# Patient Record
Sex: MALE | Race: BLACK OR AFRICAN AMERICAN | URBAN - METROPOLITAN AREA
[De-identification: names, ages, dates, MRNs, and addresses within clinical notes are randomized per-mention and may not be internally consistent; named-entity substitution may affect disease eponyms.]

---

## 2017-06-17 ENCOUNTER — OUTPATIENT (OUTPATIENT)
Dept: OUTPATIENT SERVICES | Facility: HOSPITAL | Age: 62
LOS: 1 days | Discharge: HOME | End: 2017-06-17

## 2017-06-17 DIAGNOSIS — Z00.00 ENCOUNTER FOR GENERAL ADULT MEDICAL EXAMINATION WITHOUT ABNORMAL FINDINGS: ICD-10-CM

## 2017-08-01 ENCOUNTER — OUTPATIENT (OUTPATIENT)
Dept: OUTPATIENT SERVICES | Facility: HOSPITAL | Age: 62
LOS: 1 days | Discharge: HOME | End: 2017-08-01

## 2017-08-01 DIAGNOSIS — E11.9 TYPE 2 DIABETES MELLITUS WITHOUT COMPLICATIONS: ICD-10-CM

## 2017-08-01 DIAGNOSIS — L98.9 DISORDER OF THE SKIN AND SUBCUTANEOUS TISSUE, UNSPECIFIED: ICD-10-CM

## 2017-08-01 DIAGNOSIS — E66.9 OBESITY, UNSPECIFIED: ICD-10-CM

## 2018-06-18 ENCOUNTER — OUTPATIENT (OUTPATIENT)
Dept: OUTPATIENT SERVICES | Facility: HOSPITAL | Age: 63
LOS: 1 days | Discharge: HOME | End: 2018-06-18

## 2018-06-18 DIAGNOSIS — L30.9 DERMATITIS, UNSPECIFIED: ICD-10-CM

## 2018-06-18 DIAGNOSIS — L29.9 PRURITUS, UNSPECIFIED: ICD-10-CM

## 2018-09-24 ENCOUNTER — OUTPATIENT (OUTPATIENT)
Dept: OUTPATIENT SERVICES | Facility: HOSPITAL | Age: 63
LOS: 1 days | Discharge: HOME | End: 2018-09-24

## 2018-09-24 DIAGNOSIS — R63.4 ABNORMAL WEIGHT LOSS: ICD-10-CM

## 2018-10-22 ENCOUNTER — OUTPATIENT (OUTPATIENT)
Dept: OUTPATIENT SERVICES | Facility: HOSPITAL | Age: 63
LOS: 1 days | Discharge: HOME | End: 2018-10-22

## 2018-10-22 DIAGNOSIS — R63.4 ABNORMAL WEIGHT LOSS: ICD-10-CM

## 2018-10-26 ENCOUNTER — RESULT REVIEW (OUTPATIENT)
Age: 63
End: 2018-10-26

## 2018-10-26 ENCOUNTER — OUTPATIENT (OUTPATIENT)
Dept: OUTPATIENT SERVICES | Facility: HOSPITAL | Age: 63
LOS: 1 days | Discharge: HOME | End: 2018-10-26

## 2018-10-26 VITALS — DIASTOLIC BLOOD PRESSURE: 70 MMHG | SYSTOLIC BLOOD PRESSURE: 140 MMHG | RESPIRATION RATE: 18 BRPM | HEART RATE: 67 BPM

## 2018-10-26 VITALS
HEIGHT: 66 IN | SYSTOLIC BLOOD PRESSURE: 154 MMHG | OXYGEN SATURATION: 100 % | TEMPERATURE: 97 F | WEIGHT: 160.06 LBS | RESPIRATION RATE: 16 BRPM | HEART RATE: 76 BPM | DIASTOLIC BLOOD PRESSURE: 66 MMHG

## 2018-10-26 DIAGNOSIS — K46.9 UNSPECIFIED ABDOMINAL HERNIA WITHOUT OBSTRUCTION OR GANGRENE: Chronic | ICD-10-CM

## 2018-10-26 NOTE — ASU DISCHARGE PLAN (ADULT/PEDIATRIC). - NOTIFY
Increased Irritability or Sluggishness/Excessive Diarrhea/Pain not relieved by Medications/Inability to Tolerate Liquids or Foods/Persistent Nausea and Vomiting/Bleeding that does not stop/Fever greater than 101

## 2018-10-26 NOTE — H&P PST ADULT - HISTORY OF PRESENT ILLNESS
The following is from the recent office visit    CC: Weight loss  HPI: 62-year-old male presents today with 25 pound weight loss over the past 2 years. He underwent a colonoscopy one year ago for this and it was reportedly negative. He denies any other gastrointestinal symptoms. He denies diarrhea, constipation, melena, hematochezia, difficulty swallowing, early satiety, fever, chills, change in bowel habits, change in stool caliber. He does report itching and multiple different areas of his body for which she is seen today rheumatologist and one neurologist without a satisfying explanation for his symptoms  Past Medical History:  none    Allergies:  None  Medications:none  Social History: Social alcohol  Family History: No family history of intestinal disease

## 2018-10-29 LAB — SURGICAL PATHOLOGY STUDY: SIGNIFICANT CHANGE UP

## 2018-10-31 DIAGNOSIS — B96.81 HELICOBACTER PYLORI [H. PYLORI] AS THE CAUSE OF DISEASES CLASSIFIED ELSEWHERE: ICD-10-CM

## 2018-10-31 DIAGNOSIS — K20.0 EOSINOPHILIC ESOPHAGITIS: ICD-10-CM

## 2018-10-31 DIAGNOSIS — K29.80 DUODENITIS WITHOUT BLEEDING: ICD-10-CM

## 2018-10-31 DIAGNOSIS — K29.50 UNSPECIFIED CHRONIC GASTRITIS WITHOUT BLEEDING: ICD-10-CM

## 2018-10-31 DIAGNOSIS — R63.4 ABNORMAL WEIGHT LOSS: ICD-10-CM

## 2019-01-16 ENCOUNTER — OUTPATIENT (OUTPATIENT)
Dept: OUTPATIENT SERVICES | Facility: HOSPITAL | Age: 64
LOS: 1 days | Discharge: HOME | End: 2019-01-16

## 2019-01-16 DIAGNOSIS — D89.89 OTHER SPECIFIED DISORDERS INVOLVING THE IMMUNE MECHANISM, NOT ELSEWHERE CLASSIFIED: ICD-10-CM

## 2019-01-16 DIAGNOSIS — K46.9 UNSPECIFIED ABDOMINAL HERNIA WITHOUT OBSTRUCTION OR GANGRENE: Chronic | ICD-10-CM

## 2019-01-16 DIAGNOSIS — D80.3 SELECTIVE DEFICIENCY OF IMMUNOGLOBULIN G [IGG] SUBCLASSES: ICD-10-CM

## 2019-06-22 PROBLEM — Z00.00 ENCOUNTER FOR PREVENTIVE HEALTH EXAMINATION: Status: ACTIVE | Noted: 2019-06-22

## 2019-06-27 ENCOUNTER — RECORD ABSTRACTING (OUTPATIENT)
Age: 64
End: 2019-06-27

## 2019-06-27 DIAGNOSIS — Z78.9 OTHER SPECIFIED HEALTH STATUS: ICD-10-CM

## 2019-06-27 DIAGNOSIS — K20.9 ESOPHAGITIS, UNSPECIFIED: ICD-10-CM

## 2019-06-27 DIAGNOSIS — R63.4 ABNORMAL WEIGHT LOSS: ICD-10-CM

## 2019-06-27 DIAGNOSIS — K29.70 GASTRITIS, UNSPECIFIED, W/OUT BLEEDING: ICD-10-CM

## 2019-06-27 DIAGNOSIS — A04.8 OTHER SPECIFIED BACTERIAL INTESTINAL INFECTIONS: ICD-10-CM

## 2019-06-27 DIAGNOSIS — Z98.890 OTHER SPECIFIED POSTPROCEDURAL STATES: ICD-10-CM

## 2019-06-27 RX ORDER — OMEPRAZOLE MAGNESIUM 40 MG/1
40 CAPSULE, DELAYED RELEASE ORAL
Refills: 0 | Status: ACTIVE | COMMUNITY

## 2019-07-22 ENCOUNTER — APPOINTMENT (OUTPATIENT)
Dept: GASTROENTEROLOGY | Facility: CLINIC | Age: 64
End: 2019-07-22

## 2023-08-15 ENCOUNTER — EMERGENCY (EMERGENCY)
Facility: HOSPITAL | Age: 68
LOS: 0 days | Discharge: HOME | End: 2023-08-15
Attending: EMERGENCY MEDICINE
Payer: MEDICARE

## 2023-08-15 VITALS
WEIGHT: 139.99 LBS | HEART RATE: 119 BPM | TEMPERATURE: 97 F | DIASTOLIC BLOOD PRESSURE: 83 MMHG | OXYGEN SATURATION: 98 % | SYSTOLIC BLOOD PRESSURE: 188 MMHG | RESPIRATION RATE: 18 BRPM

## 2023-08-15 DIAGNOSIS — C22.1 INTRAHEPATIC BILE DUCT CARCINOMA: ICD-10-CM

## 2023-08-15 DIAGNOSIS — H40.212 ACUTE ANGLE-CLOSURE GLAUCOMA, LEFT EYE: ICD-10-CM

## 2023-08-15 DIAGNOSIS — R51.9 HEADACHE, UNSPECIFIED: ICD-10-CM

## 2023-08-15 DIAGNOSIS — E11.9 TYPE 2 DIABETES MELLITUS WITHOUT COMPLICATIONS: ICD-10-CM

## 2023-08-15 DIAGNOSIS — I10 ESSENTIAL (PRIMARY) HYPERTENSION: ICD-10-CM

## 2023-08-15 DIAGNOSIS — H53.8 OTHER VISUAL DISTURBANCES: ICD-10-CM

## 2023-08-15 DIAGNOSIS — H57.12 OCULAR PAIN, LEFT EYE: ICD-10-CM

## 2023-08-15 DIAGNOSIS — K46.9 UNSPECIFIED ABDOMINAL HERNIA WITHOUT OBSTRUCTION OR GANGRENE: Chronic | ICD-10-CM

## 2023-08-15 LAB
ALBUMIN SERPL ELPH-MCNC: 3.7 G/DL — SIGNIFICANT CHANGE UP (ref 3.5–5.2)
ALP SERPL-CCNC: 112 U/L — SIGNIFICANT CHANGE UP (ref 30–115)
ALT FLD-CCNC: 17 U/L — SIGNIFICANT CHANGE UP (ref 0–41)
ANION GAP SERPL CALC-SCNC: 11 MMOL/L — SIGNIFICANT CHANGE UP (ref 7–14)
AST SERPL-CCNC: 24 U/L — SIGNIFICANT CHANGE UP (ref 0–41)
BILIRUB SERPL-MCNC: 0.5 MG/DL — SIGNIFICANT CHANGE UP (ref 0.2–1.2)
BUN SERPL-MCNC: 28 MG/DL — HIGH (ref 10–20)
CALCIUM SERPL-MCNC: 8.9 MG/DL — SIGNIFICANT CHANGE UP (ref 8.4–10.5)
CHLORIDE SERPL-SCNC: 105 MMOL/L — SIGNIFICANT CHANGE UP (ref 98–110)
CO2 SERPL-SCNC: 22 MMOL/L — SIGNIFICANT CHANGE UP (ref 17–32)
CREAT SERPL-MCNC: 1.5 MG/DL — SIGNIFICANT CHANGE UP (ref 0.7–1.5)
EGFR: 50 ML/MIN/1.73M2 — LOW
GLUCOSE SERPL-MCNC: 124 MG/DL — HIGH (ref 70–99)
HCT VFR BLD CALC: 32.7 % — LOW (ref 42–52)
HGB BLD-MCNC: 10.5 G/DL — LOW (ref 14–18)
MCHC RBC-ENTMCNC: 26.5 PG — LOW (ref 27–31)
MCHC RBC-ENTMCNC: 32.1 G/DL — SIGNIFICANT CHANGE UP (ref 32–37)
MCV RBC AUTO: 82.6 FL — SIGNIFICANT CHANGE UP (ref 80–94)
NRBC # BLD: 0 /100 WBCS — SIGNIFICANT CHANGE UP (ref 0–0)
PLATELET # BLD AUTO: 193 K/UL — SIGNIFICANT CHANGE UP (ref 130–400)
PMV BLD: 11.1 FL — HIGH (ref 7.4–10.4)
POTASSIUM SERPL-MCNC: 3.6 MMOL/L — SIGNIFICANT CHANGE UP (ref 3.5–5)
POTASSIUM SERPL-SCNC: 3.6 MMOL/L — SIGNIFICANT CHANGE UP (ref 3.5–5)
PROT SERPL-MCNC: 7.1 G/DL — SIGNIFICANT CHANGE UP (ref 6–8)
RBC # BLD: 3.96 M/UL — LOW (ref 4.7–6.1)
RBC # FLD: 13.2 % — SIGNIFICANT CHANGE UP (ref 11.5–14.5)
SODIUM SERPL-SCNC: 138 MMOL/L — SIGNIFICANT CHANGE UP (ref 135–146)
TROPONIN T SERPL-MCNC: <0.01 NG/ML — SIGNIFICANT CHANGE UP
WBC # BLD: 11.98 K/UL — HIGH (ref 4.8–10.8)
WBC # FLD AUTO: 11.98 K/UL — HIGH (ref 4.8–10.8)

## 2023-08-15 PROCEDURE — 99284 EMERGENCY DEPT VISIT MOD MDM: CPT

## 2023-08-15 PROCEDURE — 80053 COMPREHEN METABOLIC PANEL: CPT

## 2023-08-15 PROCEDURE — 93010 ELECTROCARDIOGRAM REPORT: CPT

## 2023-08-15 PROCEDURE — 99285 EMERGENCY DEPT VISIT HI MDM: CPT

## 2023-08-15 PROCEDURE — 36415 COLL VENOUS BLD VENIPUNCTURE: CPT

## 2023-08-15 PROCEDURE — 84484 ASSAY OF TROPONIN QUANT: CPT

## 2023-08-15 PROCEDURE — 93005 ELECTROCARDIOGRAM TRACING: CPT

## 2023-08-15 PROCEDURE — 85027 COMPLETE CBC AUTOMATED: CPT

## 2023-08-15 RX ORDER — ACETAZOLAMIDE 250 MG/1
500 TABLET ORAL ONCE
Refills: 0 | Status: DISCONTINUED | OUTPATIENT
Start: 2023-08-15 | End: 2023-08-15

## 2023-08-15 RX ORDER — AMLODIPINE BESYLATE 2.5 MG/1
5 TABLET ORAL ONCE
Refills: 0 | Status: COMPLETED | OUTPATIENT
Start: 2023-08-15 | End: 2023-08-15

## 2023-08-15 RX ORDER — TIMOLOL 0.5 %
1 DROPS OPHTHALMIC (EYE) ONCE
Refills: 0 | Status: COMPLETED | OUTPATIENT
Start: 2023-08-15 | End: 2023-08-15

## 2023-08-15 RX ORDER — DORZOLAMIDE HYDROCHLORIDE 20 MG/ML
1 SOLUTION/ DROPS OPHTHALMIC ONCE
Refills: 0 | Status: COMPLETED | OUTPATIENT
Start: 2023-08-15 | End: 2023-08-15

## 2023-08-15 RX ORDER — BRIMONIDINE TARTRATE 2 MG/MG
1 SOLUTION/ DROPS OPHTHALMIC ONCE
Refills: 0 | Status: COMPLETED | OUTPATIENT
Start: 2023-08-15 | End: 2023-08-15

## 2023-08-15 RX ORDER — LATANOPROST 0.05 MG/ML
1 SOLUTION/ DROPS OPHTHALMIC; TOPICAL ONCE
Refills: 0 | Status: COMPLETED | OUTPATIENT
Start: 2023-08-15 | End: 2023-08-15

## 2023-08-15 RX ADMIN — Medication 1 DROP(S): at 06:45

## 2023-08-15 RX ADMIN — BRIMONIDINE TARTRATE 1 DROP(S): 2 SOLUTION/ DROPS OPHTHALMIC at 06:45

## 2023-08-15 RX ADMIN — LATANOPROST 1 DROP(S): 0.05 SOLUTION/ DROPS OPHTHALMIC; TOPICAL at 06:45

## 2023-08-15 RX ADMIN — AMLODIPINE BESYLATE 5 MILLIGRAM(S): 2.5 TABLET ORAL at 11:17

## 2023-08-15 RX ADMIN — DORZOLAMIDE HYDROCHLORIDE 1 DROP(S): 20 SOLUTION/ DROPS OPHTHALMIC at 06:45

## 2023-08-15 NOTE — ED PROVIDER NOTE - ATTENDING APP SHARED VISIT CONTRIBUTION OF CARE
I personally saw the patient. SUKUMAR ortiz and I provided critical care for a total of 35 minutes. I provided a substantive portion of the care and the majority of the critical care time.     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 L sided, throbbing, accomp by L monocoular visual loss and eye pain. Admitted to [facility] 3d prior for same, ct head neg, was then transferred to [place] for mri head which was also neg, d/c to home last night with rx for migraines, htn & instr to f/u with his ophthalmologist today. Pt came to ED tonight to severity of pain. Denies cp/sob, nv, abd pain, focal numbness or weakness.    PE:  middle-aged m, holding head, appears uncomfortable  skin warm, dry  ncat  EYES- r eye nml, pupil rll, sclera clear, L eye +proptosis, orbit firm to palpation, corneal clouding, fixed mid-dilated pupil non-reactive to light/accom, tonometry- 11 OD, 60 OS, va- 20/20 OD, unable to measure/no vision OS  Cardiovascular: S1 and S2, regular rate, regular rhyth  neck supple  rrr nl s1s2 no mrg  ctab no wrr  abd soft ntnd no palpable masses no rgr  back non-tender no cvat  ext no cce dpi  neuro aaox3 grossly nf exam

## 2023-08-15 NOTE — ED PROVIDER NOTE - PHYSICAL EXAMINATION
Physical Exam    Vital Signs: I have reviewed the initial vital signs.  Constitutional: well-nourished, appears stated age, no acute distress  Eyes: (+) left eye with scleral injection and erythema, left pupil is fixed, not reactive to light and mid dilated, pt left eye is firm to palpation. pt is unable to see out of the left eye. pt right conjunctiva is pink and sclera is clear with 20/20 vision. pt pressure is 11 in the right eye and 60 in the left eye initially.   Cardiovascular: S1 and S2, regular rate, regular rhythm, well-perfused extremities, radial pulses equal and 2+ b/l.   Respiratory: unlabored respiratory effort, clear to auscultation bilaterally no wheezing, rales and rhonchi. pt is speaking full sentences. no accessory muscle use.   Musculoskeletal: FROM of b/l upper and lower extremities.   Integumentary: warm, dry, no rash  Neurologic: awake, alert, extremities’ motor and sensory functions grossly intact.  steady gait.   Psychiatric: appropriate mood, appropriate affect

## 2023-08-15 NOTE — ED PROVIDER NOTE - PATIENT PORTAL LINK FT
You can access the FollowMyHealth Patient Portal offered by [facility] by registering at the following website: [email or web address]. By joining [employer]’s FollowMyHealth portal, you will also be able to view your health information using other applications (apps) compatible with our system.

## 2023-08-15 NOTE — CONSULT NOTE ADULT - SUBJECTIVE AND OBJECTIVE BOX
67-year-old male with a past medical history bile duct cancer on immunotherapy, diabetes, and recently diagnosed hypertension presenting to ED with left eye pain and headache. pt reports going to cancer appt reporting left sided head-ache and was sent to ER in [place]. Imaging at that time was negative and pt was discharged on sumatriptan and topiramate. pt reported taking 2 doses of topiramate as prescribed saturday night and sunday morning with subsequent eye pain, decreasing vision and head-ache that brought him to ER.    IOP initially in 60s OS in ER. pt was started on gtts in the ER without significant improvement overnight.    VA 20/30, LP  IOP: 10/60  Pupil: reactive OD, dilated OS  EOM full OU with XT OS    L/L: wnl OU  C/S: w/q OU  K: cl OD; edematous/cloudy/DM OS  AC: formed OD; narrow/shallow OS  I: r/r OD; dilated OS  L: NS OU    DFE OD: poor view    B scan: no RD OU  Gonio: open OD; poor view OS    1. acute angle closure OS   -possibly aggravated by topiramate  -with gtts only IOP improved to 21; VA improved to CF, pain resolved  -cornea remains cloudy after IOP improvement, unable to perform LPI  -SE +1 OD  -discontinue topiramate for now  -start xal 0/1, timolol 0/2, brimonidine 0/3, dorzolamide 0/3, pred forte 0/4  -OK from ophthalmology to stand-point for discharge  -follow up tomorrow morning in the eye clinic for re-evaluation    2. XT OS  -denies prior history  -denies poor vision OS prior to acute event  -possible decompensated phoria given current poor vision OS    Abtaviat Sanket PGY-4  s/w Dr Grady and Bharat Vasquez

## 2023-08-15 NOTE — ED ADULT TRIAGE NOTE - CHIEF COMPLAINT QUOTE
c/o headache x weeks, patient was admitted at [place] for a week, MRI was negative from friday and sunday, patient was supposed to see ophthalmologist outpatient but headache worsening

## 2023-08-15 NOTE — CONSULT NOTE ADULT - ATTENDING COMMENTS
Mr Rene has had pain over his left eye diagnosed as migraine and prescribed topiramate, presents with pain, IOP of 60, shallow chamber and dilated pupil OS. The unilateral pain is not consistent with topiramate which should be bilateral. The dilated, fixed pupil OS also more consistent with angle closure glaucoma. Topiramate may play a role in the shallowing of the anterior chamber. IOP lowered and pain relieved with topical drops, will follow as an outpatient, likely need laser as this may be an angle closure attack potentiated by the topiramate.

## 2023-08-15 NOTE — ED ADULT NURSE NOTE - CHIEF COMPLAINT QUOTE
c/o headache x weeks, patient was admitted at [place] for a week, MRI was negative from friday and sunday, patient was supposed to see ophthalmologist outpatient but headache worsening

## 2023-08-15 NOTE — ED PROVIDER NOTE - NSFOLLOWUPCLINICS_GEN_ALL_ED_FT
[facility]  Ophthalmolgy  [address], [address]  [place] [address]  Phone: [phone]  Fax:   Scheduled Appointment: 8/16/2023

## 2023-08-15 NOTE — ED PROVIDER NOTE - NSFOLLOWUPINSTRUCTIONS_ED_ALL_ED_FT
Female WHAT YOU NEED TO KNOW:    What is glaucoma? Glaucoma is an eye disease that causes vision loss in one or both eyes. Glaucoma is usually caused by fluid buildup behind the eye. This puts pressure on your optic nerve and damages it. The 2 main types are open-angle and closed-angle glaucoma. Fluid normally drains through canals in the eyes. The fluid contains deposits that get left behind in the canals as the fluid drains. Over time, the deposits create a blockage that keeps fluid from draining easily through the canals. Open-angle means the blockage cannot be seen in tests. Closed-angle means the blockage can be seen.     What increases my risk for glaucoma?   •Being farsighted or nearsighted  •Age older than 40  •Being female  •Family history of glaucoma  •Certain medicines, such as antidepressants, antihistamines, or corticosteroids  •Medical conditions such as diabetes, hypothyroidism, or high blood pressure  •Severe eye injury  •Abnormal eye structures, such as a thick cornea  •Obstructive sleep apnea    What are the signs and symptoms of glaucoma?   •Open-angle glaucoma usually affects both eyes. Signs and symptoms may be worse in 1 eye. Any of the following may develop slowly over time:?Blind spots or areas of vision loss that get larger over time and combine  ?Loss of peripheral (side) vision  ?Only seeing clearly when you look straight ahead  •Closed-angle glaucoma may cause any of the following in one or both eyes, and may happen suddenly:?Vision loss or blurred vision  ?Eye pain that may be severe  ?Headaches that may get better when you sleep  ?Trouble seeing at night  ?Halos or rainbows around lights    How is glaucoma diagnosed? Your healthcare provider will ask about your symptoms and examine your eyes. He or she will check your peripheral vision. He or she may check how well your eyes drain fluid. Your pupils may be dilated (widened) for some of the following tests:  •A tonometry test is used to measure your eye pressure. Your eyes are numbed with eyedrops and your healthcare provider touches your eyes with an instrument. A puff of air may instead be blown into your eyes and pressure is measured with a light.  •A slit-lamp exam helps your healthcare provider have a better view of the inside of your eye. Your provider will shine a bright light in your eye to check for inflammation.  •An ophthalmoscope is used to check for optic nerve damage. Your healthcare provider will turn off the lights in the room and shine a bright light in your eyes.    How is glaucoma treated? The goal of treatment is to reduce eye pressure and prevent damage to your optic nerve. You may need any of the following:  •Eye pressure medicines help decrease eye pressure. They may also decrease the amount of fluid your eyes make or help your eyes drain better.  •Laser surgery may be needed if other treatments do not work. Healthcare providers use a laser to open your eye drainage system or create a new opening for eye fluid to drain.    What can I do to manage glaucoma?   •Get regular eye exams. This will help healthcare providers monitor your glaucoma.  •Avoid behaviors that increase eye pressure. Try not to strain when you have a bowel movement. Do not wear tight clothing around your neck or chest. Do not push or lift anything heavier than 5 pounds. Avoid strenuous activity.  •Eat foods that are healthy for your eyes. Certain foods help get more blood to vessels in your eyes. An example is leafy green vegetables, such as fresh spinach. Your healthcare provider or a dietitian can help you create meals plans.  •Carry medical alert identification. Wear medical alert jewelry or carry a card that says you have glaucoma. Ask your healthcare provider where to get these items.  Medical Alert Jewelry    When should I seek immediate care?   •You have a sudden loss of vision.  •You have blurry vision and a severe headache.  •You have severe eye pain or a change in your vision.  •You have nausea and are vomiting, along with vision changes.    When should I call my doctor?   •Your symptoms get worse, even after treatment.  •You have questions or concerns about your condition or care.    CARE AGREEMENT:    You have the right to help plan your care. Learn about your health condition and how it may be treated. Discuss treatment options with your healthcare providers to decide what care you want to receive. You always have the right to refuse treatment.

## 2023-08-15 NOTE — ED PROVIDER NOTE - CLINICAL SUMMARY MEDICAL DECISION MAKING FREE TEXT BOX
Labs and EKG were ordered and reviewed, where indicated.  Imaging was ordered and reviewed by me, where indicated.  Appropriate medications for patient's presenting complaints were ordered and effects were reassessed, where indicated.  Patient's records (prior hospital, ED visit, and/or nursing home note) were reviewed, if available.  Additional history was obtained from EMS, family, and/or PCP (where available).  Escalation to admission/observation was considered.  Patient transported to Ophthalmology clinic for defitinive surgical mgmt    L eye pain, visual loss, ha, recent admissions with neg cth/mri head, exam c/w acute angle closure glaucoma, ophtho consulted, bb/other drops administered as per ophtho's instructions, transferred to clinic for definitive surgcal mgmt/laser    L acute

## 2023-08-15 NOTE — ED PROVIDER NOTE - OBJECTIVE STATEMENT
67-year-old male with a past medical history bile duct cancer on immunotherapy, diabetes, and recently diagnosed hypertension on amlodipine presents to the ED for evaluation of left eye pain and left-sided headache that began on Friday.  Patient reports he was seen at [facility] had a normal CT then was transferred to [facility].  Patient had an MRI performed at [place] which was normal.  Patient was started on Topamax, butabital, amlodipine, artificial tears, pregabalin, and a Medrol Dosepak.  Patient has an appointment with his ophthalmologist at 10 AM but was having excruciating pain in the left eye again to the ER.  Patient denies use of blood thinners, recent trauma, dizziness, weakness, numbness, tingling, urinary or bowel retention or incontinence, weakness, or use of glasses or contacts.

## 2023-08-15 NOTE — ED PROVIDER NOTE - PROGRESS NOTE DETAILS
FF: optho was consulted immediately. optho advise to start timolol, brimonidine, lantaprost, and dorzolomide drops every 15 minutes. I started drops. pt had two rounds of the eye drops and then I repeated the pressure in the left eye which is now 68. optho is aware and is arriving to the ed now. 3 round of eye drops given. optho now at bedside. Nohemi - wrap up note - Patient signed out to me follow-up ophthalmology evaluation.  Patient presented with headache found to be in acute alcohol drawl, given pulmonary dorzolamide latanoprost timolol drops.  Patient was seen by ophthalmology with improvement in pressure reevaluated.  Pathology patient stable for discharge with plan for ophthalmology clinic follow-up tomorrow the patient might undergo procedure tomorrow.  Patient has drops to be taken overnight.  Lastly this might be despite topiramate and the patient was advised not to take it.

## 2023-08-16 ENCOUNTER — APPOINTMENT (OUTPATIENT)
Dept: OPHTHALMOLOGY | Facility: CLINIC | Age: 68
End: 2023-08-16

## 2024-01-16 ENCOUNTER — APPOINTMENT (OUTPATIENT)
Dept: OPHTHALMOLOGY | Facility: CLINIC | Age: 69
End: 2024-01-16

## 2024-03-12 ENCOUNTER — OUTPATIENT (OUTPATIENT)
Dept: OUTPATIENT SERVICES | Facility: HOSPITAL | Age: 69
LOS: 1 days | End: 2024-03-12
Payer: MEDICARE

## 2024-03-12 ENCOUNTER — APPOINTMENT (OUTPATIENT)
Dept: OPHTHALMOLOGY | Facility: CLINIC | Age: 69
End: 2024-03-12
Payer: MEDICARE

## 2024-03-12 DIAGNOSIS — K46.9 UNSPECIFIED ABDOMINAL HERNIA WITHOUT OBSTRUCTION OR GANGRENE: Chronic | ICD-10-CM

## 2024-03-12 DIAGNOSIS — H25.89 OTHER AGE-RELATED CATARACT: ICD-10-CM

## 2024-03-12 PROCEDURE — 92136 OPHTHALMIC BIOMETRY: CPT | Mod: 26

## 2024-03-12 PROCEDURE — 92136 OPHTHALMIC BIOMETRY: CPT

## 2024-03-25 ENCOUNTER — TRANSCRIPTION ENCOUNTER (OUTPATIENT)
Age: 69
End: 2024-03-25

## 2024-03-25 ENCOUNTER — OUTPATIENT (OUTPATIENT)
Dept: OUTPATIENT SERVICES | Facility: HOSPITAL | Age: 69
LOS: 1 days | Discharge: HOME | End: 2024-03-25
Payer: MEDICARE

## 2024-03-25 VITALS
WEIGHT: 128.09 LBS | TEMPERATURE: 96 F | OXYGEN SATURATION: 99 % | DIASTOLIC BLOOD PRESSURE: 48 MMHG | RESPIRATION RATE: 18 BRPM | HEART RATE: 90 BPM | SYSTOLIC BLOOD PRESSURE: 93 MMHG | HEIGHT: 66 IN

## 2024-03-25 VITALS — SYSTOLIC BLOOD PRESSURE: 107 MMHG | HEART RATE: 71 BPM | DIASTOLIC BLOOD PRESSURE: 53 MMHG

## 2024-03-25 DIAGNOSIS — K46.9 UNSPECIFIED ABDOMINAL HERNIA WITHOUT OBSTRUCTION OR GANGRENE: Chronic | ICD-10-CM

## 2024-03-25 DIAGNOSIS — H25.22 AGE-RELATED CATARACT, MORGAGNIAN TYPE, LEFT EYE: ICD-10-CM

## 2024-03-25 DIAGNOSIS — Z98.890 OTHER SPECIFIED POSTPROCEDURAL STATES: Chronic | ICD-10-CM

## 2024-03-25 LAB — GLUCOSE BLDC GLUCOMTR-MCNC: 82 MG/DL — SIGNIFICANT CHANGE UP (ref 70–99)

## 2024-03-25 PROCEDURE — 82962 GLUCOSE BLOOD TEST: CPT

## 2024-03-25 PROCEDURE — C1889: CPT

## 2024-03-25 PROCEDURE — V2632: CPT

## 2024-03-25 RX ORDER — DAPAGLIFLOZIN 10 MG/1
1 TABLET, FILM COATED ORAL
Refills: 0 | DISCHARGE

## 2024-03-25 RX ORDER — CHOLECALCIFEROL (VITAMIN D3) 125 MCG
0 CAPSULE ORAL
Refills: 0 | DISCHARGE

## 2024-03-25 RX ORDER — FAMOTIDINE 10 MG/ML
1 INJECTION INTRAVENOUS
Refills: 0 | DISCHARGE

## 2024-03-25 NOTE — ASU PATIENT PROFILE, ADULT - AS SC BRADEN MOISTURE
CVICU OT note- attempted OT tx session this am- pt currently declining out of bed activity - reports he is feeling \"depressed\" because \"the cardiologist just came in here and told me there's not much more else they can do for me.\" Educated pt on the importance of out of bed activity to increase overall strength and activity tolerance. RN present and aware that pt is declining out of bed activity at this time.    (4) rarely moist

## 2024-03-25 NOTE — ASU PATIENT PROFILE, ADULT - NSICDXPASTMEDICALHX_GEN_ALL_CORE_FT
PAST MEDICAL HISTORY:  History of medical problems dm type 2, bile duct cancer on chemo, cataracts, gerd, neuropathy (crawling sensation)

## 2024-03-25 NOTE — ASU PATIENT PROFILE, ADULT - FALL HARM RISK - HARM RISK INTERVENTIONS

## 2024-03-27 DIAGNOSIS — H26.8 OTHER SPECIFIED CATARACT: ICD-10-CM

## 2024-03-27 DIAGNOSIS — H25.13 AGE-RELATED NUCLEAR CATARACT, BILATERAL: ICD-10-CM

## 2024-03-28 DIAGNOSIS — H25.12 AGE-RELATED NUCLEAR CATARACT, LEFT EYE: ICD-10-CM

## 2024-04-23 PROBLEM — Z87.898 PERSONAL HISTORY OF OTHER SPECIFIED CONDITIONS: Chronic | Status: ACTIVE | Noted: 2024-03-25

## 2024-05-14 ENCOUNTER — APPOINTMENT (OUTPATIENT)
Dept: OPHTHALMOLOGY | Facility: CLINIC | Age: 69
End: 2024-05-14

## 2024-06-11 NOTE — ASU PATIENT PROFILE, ADULT - ACCEPTABLE
Procedure: Anoscopy    Surgeon: Alexsander Lima    Anesthesia: None    Findings: See the progress note attached for all findings    Operative Summary: The patient was placed in a prone position on the proctoscopy table, the hips were flexed in the jackknife position.    Using a well-lubricated finger, digital rectal exam was performed in anticipation of the anoscope.    The anoscope was then inserted under direct visualization.    Excellent visualization was performed in a 360° fashion.    The scope was removed. The patient was taken out of the prone, jackknife, position.     The patient tolerated the procedure well.    0

## 2024-06-17 ENCOUNTER — EMERGENCY (EMERGENCY)
Facility: HOSPITAL | Age: 69
LOS: 0 days | Discharge: HOME | End: 2024-06-18
Attending: STUDENT IN AN ORGANIZED HEALTH CARE EDUCATION/TRAINING PROGRAM
Payer: MEDICARE

## 2024-06-17 VITALS
SYSTOLIC BLOOD PRESSURE: 97 MMHG | TEMPERATURE: 100 F | HEART RATE: 75 BPM | RESPIRATION RATE: 18 BRPM | OXYGEN SATURATION: 100 % | DIASTOLIC BLOOD PRESSURE: 59 MMHG | WEIGHT: 117.95 LBS | HEIGHT: 66 IN

## 2024-06-17 DIAGNOSIS — R53.83 OTHER FATIGUE: ICD-10-CM

## 2024-06-17 DIAGNOSIS — R50.9 FEVER, UNSPECIFIED: ICD-10-CM

## 2024-06-17 DIAGNOSIS — Z20.822 CONTACT WITH AND (SUSPECTED) EXPOSURE TO COVID-19: ICD-10-CM

## 2024-06-17 DIAGNOSIS — Z98.890 OTHER SPECIFIED POSTPROCEDURAL STATES: Chronic | ICD-10-CM

## 2024-06-17 DIAGNOSIS — C22.1 INTRAHEPATIC BILE DUCT CARCINOMA: ICD-10-CM

## 2024-06-17 DIAGNOSIS — E11.9 TYPE 2 DIABETES MELLITUS WITHOUT COMPLICATIONS: ICD-10-CM

## 2024-06-17 PROCEDURE — 87040 BLOOD CULTURE FOR BACTERIA: CPT

## 2024-06-17 PROCEDURE — 71045 X-RAY EXAM CHEST 1 VIEW: CPT

## 2024-06-17 PROCEDURE — 87150 DNA/RNA AMPLIFIED PROBE: CPT

## 2024-06-17 PROCEDURE — 87186 SC STD MICRODIL/AGAR DIL: CPT

## 2024-06-17 PROCEDURE — 85730 THROMBOPLASTIN TIME PARTIAL: CPT

## 2024-06-17 PROCEDURE — 87077 CULTURE AEROBIC IDENTIFY: CPT

## 2024-06-17 PROCEDURE — 85610 PROTHROMBIN TIME: CPT

## 2024-06-17 PROCEDURE — 93005 ELECTROCARDIOGRAM TRACING: CPT

## 2024-06-17 PROCEDURE — 81003 URINALYSIS AUTO W/O SCOPE: CPT

## 2024-06-17 PROCEDURE — 83605 ASSAY OF LACTIC ACID: CPT

## 2024-06-17 PROCEDURE — 99285 EMERGENCY DEPT VISIT HI MDM: CPT | Mod: FS

## 2024-06-17 PROCEDURE — 0241U: CPT

## 2024-06-17 PROCEDURE — 76705 ECHO EXAM OF ABDOMEN: CPT

## 2024-06-17 PROCEDURE — 96375 TX/PRO/DX INJ NEW DRUG ADDON: CPT

## 2024-06-17 PROCEDURE — 85025 COMPLETE CBC W/AUTO DIFF WBC: CPT

## 2024-06-17 PROCEDURE — 99285 EMERGENCY DEPT VISIT HI MDM: CPT | Mod: 25

## 2024-06-17 PROCEDURE — 74177 CT ABD & PELVIS W/CONTRAST: CPT | Mod: MC

## 2024-06-17 PROCEDURE — 36415 COLL VENOUS BLD VENIPUNCTURE: CPT

## 2024-06-17 PROCEDURE — 96374 THER/PROPH/DIAG INJ IV PUSH: CPT

## 2024-06-17 PROCEDURE — 80053 COMPREHEN METABOLIC PANEL: CPT

## 2024-06-18 VITALS
OXYGEN SATURATION: 99 % | RESPIRATION RATE: 18 BRPM | TEMPERATURE: 99 F | HEART RATE: 75 BPM | DIASTOLIC BLOOD PRESSURE: 56 MMHG | SYSTOLIC BLOOD PRESSURE: 115 MMHG

## 2024-06-18 LAB
ALBUMIN SERPL ELPH-MCNC: 2.2 G/DL — LOW (ref 3.5–5.2)
ALP SERPL-CCNC: 426 U/L — HIGH (ref 30–115)
ALT FLD-CCNC: 30 U/L — SIGNIFICANT CHANGE UP (ref 0–41)
ANION GAP SERPL CALC-SCNC: 8 MMOL/L — SIGNIFICANT CHANGE UP (ref 7–14)
APPEARANCE UR: CLEAR — SIGNIFICANT CHANGE UP
APTT BLD: 27.7 SEC — SIGNIFICANT CHANGE UP (ref 27–39.2)
AST SERPL-CCNC: 55 U/L — HIGH (ref 0–41)
BASOPHILS # BLD AUTO: 0.01 K/UL — SIGNIFICANT CHANGE UP (ref 0–0.2)
BASOPHILS NFR BLD AUTO: 0.4 % — SIGNIFICANT CHANGE UP (ref 0–1)
BILIRUB SERPL-MCNC: 1.6 MG/DL — HIGH (ref 0.2–1.2)
BILIRUB UR-MCNC: NEGATIVE — SIGNIFICANT CHANGE UP
BUN SERPL-MCNC: 21 MG/DL — HIGH (ref 10–20)
CALCIUM SERPL-MCNC: 7.2 MG/DL — LOW (ref 8.4–10.5)
CHLORIDE SERPL-SCNC: 99 MMOL/L — SIGNIFICANT CHANGE UP (ref 98–110)
CO2 SERPL-SCNC: 22 MMOL/L — SIGNIFICANT CHANGE UP (ref 17–32)
COLOR SPEC: YELLOW — SIGNIFICANT CHANGE UP
CREAT SERPL-MCNC: 1.2 MG/DL — SIGNIFICANT CHANGE UP (ref 0.7–1.5)
DIFF PNL FLD: NEGATIVE — SIGNIFICANT CHANGE UP
E COLI DNA BLD POS QL NAA+NON-PROBE: SIGNIFICANT CHANGE UP
EGFR: 65 ML/MIN/1.73M2 — SIGNIFICANT CHANGE UP
EOSINOPHIL # BLD AUTO: 0.03 K/UL — SIGNIFICANT CHANGE UP (ref 0–0.7)
EOSINOPHIL NFR BLD AUTO: 1.2 % — SIGNIFICANT CHANGE UP (ref 0–8)
FLUAV AG NPH QL: SIGNIFICANT CHANGE UP
FLUBV AG NPH QL: SIGNIFICANT CHANGE UP
GLUCOSE SERPL-MCNC: 231 MG/DL — HIGH (ref 70–99)
GLUCOSE UR QL: NEGATIVE MG/DL — SIGNIFICANT CHANGE UP
GRAM STN FLD: ABNORMAL
GRAM STN FLD: ABNORMAL
HCT VFR BLD CALC: 23.4 % — LOW (ref 42–52)
HGB BLD-MCNC: 8.1 G/DL — LOW (ref 14–18)
IMM GRANULOCYTES NFR BLD AUTO: 1.2 % — HIGH (ref 0.1–0.3)
INR BLD: 1.42 RATIO — HIGH (ref 0.65–1.3)
KETONES UR-MCNC: NEGATIVE MG/DL — SIGNIFICANT CHANGE UP
LACTATE SERPL-SCNC: 1.9 MMOL/L — SIGNIFICANT CHANGE UP (ref 0.7–2)
LEUKOCYTE ESTERASE UR-ACNC: NEGATIVE — SIGNIFICANT CHANGE UP
LYMPHOCYTES # BLD AUTO: 0.58 K/UL — LOW (ref 1.2–3.4)
LYMPHOCYTES # BLD AUTO: 23.4 % — SIGNIFICANT CHANGE UP (ref 20.5–51.1)
MCHC RBC-ENTMCNC: 29.3 PG — SIGNIFICANT CHANGE UP (ref 27–31)
MCHC RBC-ENTMCNC: 34.6 G/DL — SIGNIFICANT CHANGE UP (ref 32–37)
MCV RBC AUTO: 84.8 FL — SIGNIFICANT CHANGE UP (ref 80–94)
METHOD TYPE: SIGNIFICANT CHANGE UP
MONOCYTES # BLD AUTO: 0.19 K/UL — SIGNIFICANT CHANGE UP (ref 0.1–0.6)
MONOCYTES NFR BLD AUTO: 7.7 % — SIGNIFICANT CHANGE UP (ref 1.7–9.3)
NEUTROPHILS # BLD AUTO: 1.64 K/UL — SIGNIFICANT CHANGE UP (ref 1.4–6.5)
NEUTROPHILS NFR BLD AUTO: 66.1 % — SIGNIFICANT CHANGE UP (ref 42.2–75.2)
NITRITE UR-MCNC: NEGATIVE — SIGNIFICANT CHANGE UP
NRBC # BLD: 0 /100 WBCS — SIGNIFICANT CHANGE UP (ref 0–0)
PH UR: 6 — SIGNIFICANT CHANGE UP (ref 5–8)
PLATELET # BLD AUTO: 166 K/UL — SIGNIFICANT CHANGE UP (ref 130–400)
PMV BLD: 10.7 FL — HIGH (ref 7.4–10.4)
POTASSIUM SERPL-MCNC: 4.1 MMOL/L — SIGNIFICANT CHANGE UP (ref 3.5–5)
POTASSIUM SERPL-SCNC: 4.1 MMOL/L — SIGNIFICANT CHANGE UP (ref 3.5–5)
PROT SERPL-MCNC: 5.9 G/DL — LOW (ref 6–8)
PROT UR-MCNC: SIGNIFICANT CHANGE UP MG/DL
PROTHROM AB SERPL-ACNC: 16.2 SEC — HIGH (ref 9.95–12.87)
RBC # BLD: 2.76 M/UL — LOW (ref 4.7–6.1)
RBC # FLD: 13.6 % — SIGNIFICANT CHANGE UP (ref 11.5–14.5)
RSV RNA NPH QL NAA+NON-PROBE: SIGNIFICANT CHANGE UP
SARS-COV-2 RNA SPEC QL NAA+PROBE: SIGNIFICANT CHANGE UP
SODIUM SERPL-SCNC: 129 MMOL/L — LOW (ref 135–146)
SP GR SPEC: 1.03 — SIGNIFICANT CHANGE UP (ref 1–1.03)
SPECIMEN SOURCE: SIGNIFICANT CHANGE UP
UROBILINOGEN FLD QL: 1 MG/DL — SIGNIFICANT CHANGE UP (ref 0.2–1)
WBC # BLD: 2.48 K/UL — LOW (ref 4.8–10.8)
WBC # FLD AUTO: 2.48 K/UL — LOW (ref 4.8–10.8)

## 2024-06-18 PROCEDURE — 74177 CT ABD & PELVIS W/CONTRAST: CPT | Mod: 26,MC

## 2024-06-18 PROCEDURE — 76705 ECHO EXAM OF ABDOMEN: CPT | Mod: 26

## 2024-06-18 PROCEDURE — 71045 X-RAY EXAM CHEST 1 VIEW: CPT | Mod: 26

## 2024-06-18 PROCEDURE — 99223 1ST HOSP IP/OBS HIGH 75: CPT

## 2024-06-18 PROCEDURE — 93010 ELECTROCARDIOGRAM REPORT: CPT

## 2024-06-18 RX ORDER — CEFEPIME 1 G/1
1000 INJECTION, POWDER, FOR SOLUTION INTRAMUSCULAR; INTRAVENOUS ONCE
Refills: 0 | Status: COMPLETED | OUTPATIENT
Start: 2024-06-18 | End: 2024-06-18

## 2024-06-18 RX ORDER — METRONIDAZOLE 500 MG
500 TABLET ORAL ONCE
Refills: 0 | Status: COMPLETED | OUTPATIENT
Start: 2024-06-18 | End: 2024-06-18

## 2024-06-18 RX ORDER — AZTREONAM 2 G
2000 VIAL (EA) INJECTION EVERY 8 HOURS
Refills: 0 | Status: DISCONTINUED | OUTPATIENT
Start: 2024-06-18 | End: 2024-06-18

## 2024-06-18 RX ORDER — VANCOMYCIN HCL 1 G
1000 VIAL (EA) INTRAVENOUS ONCE
Refills: 0 | Status: COMPLETED | OUTPATIENT
Start: 2024-06-18 | End: 2024-06-18

## 2024-06-18 RX ORDER — METRONIDAZOLE 500 MG
500 TABLET ORAL ONCE
Refills: 0 | Status: DISCONTINUED | OUTPATIENT
Start: 2024-06-18 | End: 2024-06-18

## 2024-06-18 RX ORDER — SODIUM CHLORIDE 9 MG/ML
1650 INJECTION, SOLUTION INTRAVENOUS ONCE
Refills: 0 | Status: COMPLETED | OUTPATIENT
Start: 2024-06-18 | End: 2024-06-18

## 2024-06-18 RX ORDER — AZTREONAM 2 G
2000 VIAL (EA) INJECTION ONCE
Refills: 0 | Status: COMPLETED | OUTPATIENT
Start: 2024-06-18 | End: 2024-06-18

## 2024-06-18 RX ORDER — ACETAMINOPHEN 500 MG
650 TABLET ORAL ONCE
Refills: 0 | Status: COMPLETED | OUTPATIENT
Start: 2024-06-18 | End: 2024-06-18

## 2024-06-18 RX ADMIN — Medication 250 MILLIGRAM(S): at 03:21

## 2024-06-18 RX ADMIN — Medication 650 MILLIGRAM(S): at 01:25

## 2024-06-18 RX ADMIN — SODIUM CHLORIDE 1650 MILLILITER(S): 9 INJECTION, SOLUTION INTRAVENOUS at 01:25

## 2024-06-18 RX ADMIN — Medication 50 MILLIGRAM(S): at 05:26

## 2024-06-18 RX ADMIN — CEFEPIME 100 MILLIGRAM(S): 1 INJECTION, POWDER, FOR SOLUTION INTRAMUSCULAR; INTRAVENOUS at 01:36

## 2024-06-18 RX ADMIN — Medication 100 MILLIGRAM(S): at 06:13

## 2024-06-18 NOTE — ED PROVIDER NOTE - NSPREEXISTRELATION_ED_A_ED_FT
Patient with history of cholangiocarcinoma, on chemo, follows at [place], has had multiple procedures at [place] [facility] and should continue to get his care there.

## 2024-06-18 NOTE — ED PROVIDER NOTE - NSRECPHYSICIAN_ED_A_ED_FT
Problem: Anger Management/Homicidal Ideation:  Goal: Absence of angry outbursts  Description: Absence of angry outbursts  Outcome: Ongoing  Note: Patient is free from angry outbursts and participates appropriately in milieu and group therapy. Problem: Depressive Behavior With or Without Suicide Precautions:  Goal: Ability to disclose and discuss suicidal ideas will improve  Description: Ability to disclose and discuss suicidal ideas will improve  Outcome: Ongoing  Note: Patient denies suicidal ideation and has no plans to self harm at this time. Q15 checks are maintained at this time for safety. Dr. Ramsey Eid

## 2024-06-18 NOTE — ED PROVIDER NOTE - NSICDXPASTMEDICALHX_GEN_ALL_CORE_FT
I took over care of this pt at 7 AM from Dr. Altamirano.  They are awaiting labs, IVF.    He gave a stool sample.  This was sent.      Results for orders placed or performed during the hospital encounter of 06/07/18   CBC & Auto Differential   Result Value Ref Range    WBC 3.7 (L) 4.2 - 11.0 K/mcL    RBC 5.24 4.50 - 5.90 mil/mcL    HGB 17.0 13.0 - 17.0 g/dL    HCT 47.1 39.0 - 51.0 %    MCV 89.9 78.0 - 100.0 fl    MCH 32.4 26.0 - 34.0 pg    MCHC 36.1 32.0 - 36.5 g/dL    RDW-CV 12.8 11.0 - 15.0 %     140 - 450 K/mcL    DIFF TYPE AUTOMATED DIFFERENTIAL     Neutrophil 68 %    LYMPH 20 %    MONO 9 %    EOSIN 3 %    BASO 0 %    Absolute Neutrophil 2.5 1.8 - 7.7 K/mcL    Absolute Lymph 0.7 (L) 1.0 - 4.0 K/mcL    Absolute Mono 0.3 0.3 - 0.9 K/mcL    Absolute Eos 0.1 0.1 - 0.5 K/mcL    Absolute Baso 0.0 0.0 - 0.3 K/mcL   Chem 8 Panel - Point of Care   Result Value Ref Range    Sodium  135 - 145 mmol/L    Potassium POC 4.4 3.4 - 5.1 mmol/L    Chloride  98 - 107 mmol/L    CALCIUM IONIZED-POC 1.06 (L) 1.15 - 1.29 mmol/L    CO2 Total 21 19 - 24 mmol/L    GLUCOSE  (H) 65 - 99 mg/dL    BUN POC 22 (H) 6 - 20 mg/dL    HEMATOCRIT POC 53.0 (H) 39.0 - 51.0 %    Hemoglobin POC 18.0 (H) 13.0 - 17.0 g/dL    ANION GAP POC 16 mmol/L    Creatinine POC 1.00 0.67 - 1.17 mg/dL    Estimated GFR  (POC) >90     Estimated GFR Non- (POC) 79      Stool cultures and C. difficile are pending. He is given a liter and feels better. Mild leukopenia noted which she has had previously. Discharge home encouraged p.o. intake of fluids especially and await cultures and PCR.    R19.7 Diarrhea of presumed infectious origin  (primary encounter diagnosis)       Jameel Maritno MD  06/07/18 0902     PAST MEDICAL HISTORY:  History of medical problems dm type 2, bile duct cancer on chemo, cataracts, gerd, neuropathy (crawling sensation)

## 2024-06-18 NOTE — CONSULT NOTE ADULT - SUBJECTIVE AND OBJECTIVE BOX
Patient is a pleasant 68-year-old male with a past medical history of diabetes, cataracts, neuropathy, and cholangiocarcinoma who is followed primarily at [facility] in [place] who has been following him since May 2022 for cholangiocarcinoma in which she was deemed not a surgical candidate because of extensive metastatic disease.  Patient has had prior common bile duct and hepatic duct stenting.  Patient at one point was considered for a hepatectomy but mets excluded him from this.  Patient has been on chemotherapy and also required a cholecystoduodenostomy per Chanelle for additional drainage.  Patient currently is relatively at baseline.  Patient has some fatigue.  Patient does not have current abdominal pain.  Patient present with son in the ER.      PAST MEDICAL & SURGICAL HISTORY:  DM  bile duct cancer on chemo  cataracts  Neuropathy       H/O inguinal hernia repair  right groin      MEDICATIONS  (STANDING):  aztreonam  IVPB 2000 milliGRAM(s) IV Intermittent every 8 hours  metroNIDAZOLE  IVPB 500 milliGRAM(s) IV Intermittent once        Allergies  cefepime (Pruritus)        Review of Systems  General: See HPI  HEENT: Denies Trouble Swallowing ,Denies  Sore Throat , Denies Change in hearing/vision/speech ,Denies Dizziness    Cardio: Denies  Chest Pain , Palpitations    Respiratory: Denies worsening of SOB, Denies Cough  Abdomen: See detailed HPI  Neuro: Denies Headache Denies Dizziness, Denies Paresthesias  MSK: Denies pain in Bones/Joints/Muscles   Psych: Patient denies depression, denies suicidal or homicidal ideations  Integ: Patient Denies rash, or new skin lesions     Vital Signs Last 24 Hrs  T(C): 36.5 (2024 07:46), Max: 37.9 (2024 23:37)  T(F): 97.7 (2024 07:46), Max: 100.3 (2024 23:37)  HR: 55 (2024 07:46) (55 - 75)  BP: 95/59 (2024 07:46) (95/59 - 114/53)  BP(mean): --  RR: 18 (2024 07:46) (18 - 18)  SpO2: 100% (2024 07:46) (98% - 100%)    Parameters below as of 2024 07:46  Patient On (Oxygen Delivery Method): room air    Physical Exam  Gen: NAD  Head: NC/AT, no visible deformity  ENT: PERRLA, Sclera Non Icteric   Cardio: S1/S2 No S3/S4, Regular  Resp: CTA B/L  Abdomen: Soft, ND/NT- Drain noted Right abdomen   Neuro: AAOx3, Cranial Nerve II-XII intact   Extremities: FROM x 4  Skin: No jaundice, no excoriation       Labs:                          8.1    2.48  )-----------( 166      ( 2024 00:50 )             23.4       Auto Neutrophil %: 66.1 % (24 @ 00:50)  Auto Immature Granulocyte %: 1.2 % (24 @ 00:50)        129<L>  |  99  |  21<H>  ----------------------------<  231<H>  4.1   |  22  |  1.2      Calcium: 7.2 mg/dL (24 @ 00:50)      LFTs:             5.9  | 1.6  | 55       ------------------[426     ( 2024 00:50 )  2.2  | x    | 30              Lactate, Blood: 1.9 mmol/L (24 @ 00:50)      Coags:     16.20  ----< 1.42    ( 2024 00:50 )     27.7        Urinalysis Basic - ( 2024 04:56 )  Color: Yellow / Appearance: Clear / S.026 / pH: x  Gluc: x / Ketone: Negative mg/dL  / Bili: Negative / Urobili: 1.0 mg/dL   Blood: x / Protein: Trace mg/dL / Nitrite: Negative   Leuk Esterase: Negative / RBC: x / WBC x   Sq Epi: x / Non Sq Epi: x / Bacteria: x      RADIOLOGY & ADDITIONAL STUDIES:  US Abdomen Upper Quadrant Right 24   IMPRESSION:    Limited visualization of the liver. No hepatic collection on provided   images. See concurrent CT for additional findings.    Gallbladder is not visualized.        CT Abdomen and Pelvis w/ IV Cont 24   IMPRESSION:    1.8 cm cystic hypoattenuating lesion in the right hepatic lobe with   slight surrounding enhancement. Additional adjacent subcentimeter similar   lesion. This is indeterminate for an infectious focus/abscess, cyst or   metastasis. Correlation with outside imaging is suggested. Given the   history of infection, short-term CT is suggested to evaluate for   evolution.    Heterogeneous liver with biliary stents. Cholangiocarcinoma is not well   delineated on this exam. Correlation with outside imaging and oncologic   history.    Indeterminate devices in the proximal duodenum. Comparison with outside   imaging and history is recommended.    Small to moderate abdominopelvic ascites.

## 2024-06-18 NOTE — ED PROVIDER NOTE - ATTENDING APP SHARED VISIT CONTRIBUTION OF CARE
68 years old male history of diabetes, bile duct cancer on chemotherapy (last dose 5 days ago for 48 hours from [facility])  pt here for 1 day of fever. Tmax 102. pt endorsing fatigue, chills.  no cp, ap, diarrhea, cough, congestion.  no syncope/trauma.    vs soft bp  gen- NAD, aaox3  card-rrr  lungs-ctab, no wheezing or rhonchi  abd-sntnd, no guarding or rebound  neuro- full str/sensation, cn ii-xii grossly intact, normal coordination

## 2024-06-18 NOTE — CONSULT NOTE ADULT - SUBJECTIVE AND OBJECTIVE BOX
GENERAL SURGERY CONSULT NOTE    Patient: MARIAM IVORY , 68y (53)Male   MRN: [id number]  Location: [facility] ED  Visit: 24 Emergency  Date: 24 @ 06:26    HPI:  68 M pmh of DM and cholangiocarcinoma presents to the ED with 1 day of chills. Patient diagnosed with cholangiocarcinoma May 2022 and subsequently had plastic stents placed in right and left hepatic ducts. In 2022 underwent right portal vein embolization with hopes of hepatectomy but widespread metastasis to liver excluded patient as surgical candidate. In 2022 started chemotherpy including gemcitabine, cisplatin, and durvalumab. 2023 had cholecystoduodenostomy after perc denys for cholecystetitis. Has a peritoneal drain in place for recurrent ascites (1 L per day). Patient's last chemotherapy consisted of gemcitabine and durvalumab 5 days ago with plans from his oncologist Dr. Littlejohn at [facility] to change to FOLFIRI. Upon arrival today patient with Tmax 100.3 with decreased PO intake but denies fevers at home, nausea/vomiting, diarrhea/constipation, yellowing of skin/eyes, chest pain, sob, dark/bloody stools, pale stools. Last intervention was 2024 when one of his biliary stents was exchanged due to obstruction.     PAST MEDICAL & SURGICAL HISTORY:  History of medical problems  dm type 2, bile duct cancer on chemo, cataracts, gerd, neuropathy (crawling sensation)      H/O inguinal hernia repair  right groin          Home Medications:  famotidine 20 mg oral tablet: 1 tab(s) orally once a day (at bedtime) (25 Mar 2024 09:39)  Farxiga 10 mg oral tablet: 1 tab(s) orally once a day (25 Mar 2024 09:38)  PARoxetine 10 mg oral tablet: 1 tab(s) orally once a day (25 Mar 2024 09:40)  pregabalin 100 mg oral capsule: 1 cap(s) orally 2 times a day (25 Mar 2024 09:40)  vitamin d3: weekly (25 Mar 2024 09:39)        VITALS:  T(F): 96.7 (24 @ 05:33), Max: 100.3 (24 @ 23:37)  HR: 60 (24 @ 05:33) (60 - 75)  BP: 114/53 (24 @ 05:33) (97/59 - 114/53)  RR: 18 (24 @ 05:33) (18 - 18)  SpO2: 98% (24 @ 05:33) (98% - 100%)    PHYSICAL EXAM:  General: NAD, AAOx3, calm and cooperative  HEENT: NCAT, LEATHA, EOMI, Trachea ML, Neck supple, no scleral icterus  Cardiac: RRR S1, S2, no Murmurs, rubs or gallops  Respiratory: CTAB, normal respiratory effort, breath sounds equal BL, no wheeze, rhonchi or crackles  Abdomen: Soft, non-distended, non-tender, tenkoff in place no erythema/edema/fluctuance at insertion site  skin: no appreciable rashes/jaundice    MEDICATIONS  (STANDING):    MEDICATIONS  (PRN):      LAB/STUDIES:                        8.1    2.48  )-----------( 166      ( 2024 00:50 )             23.4         129<L>  |  99  |  21<H>  ----------------------------<  231<H>  4.1   |  22  |  1.2    Ca    7.2<L>      2024 00:50    TPro  5.9<L>  /  Alb  2.2<L>  /  TBili  1.6<H>  /  DBili  x   /  AST  55<H>  /  ALT  30  /  AlkPhos  426<H>      PT/INR - ( 2024 00:50 )   PT: 16.20 sec;   INR: 1.42 ratio         PTT - ( 2024 00:50 )  PTT:27.7 sec  LIVER FUNCTIONS - ( 2024 00:50 )  Alb: 2.2 g/dL / Pro: 5.9 g/dL / ALK PHOS: 426 U/L / ALT: 30 U/L / AST: 55 U/L / GGT: x           Urinalysis Basic - ( 2024 04:56 )    Color: Yellow / Appearance: Clear / S.026 / pH: x  Gluc: x / Ketone: Negative mg/dL  / Bili: Negative / Urobili: 1.0 mg/dL   Blood: x / Protein: Trace mg/dL / Nitrite: Negative   Leuk Esterase: Negative / RBC: x / WBC x   Sq Epi: x / Non Sq Epi: x / Bacteria: x                  Urinalysis with Rflx Culture (collected 2024 04:56)      IMAGING:    < from: CT Abdomen and Pelvis w/ IV Cont (24 @ 02:28) >  IMPRESSION:    1.8 cm cystic hypoattenuating lesion in the right hepatic lobe with   slight surrounding enhancement. Additional adjacent subcentimeter similar   lesion. This is indeterminate for an infectious focus/abscess, cyst or   metastasis. Correlation with outside imaging is suggested. Given the   history of infection, short-term CT is suggested to evaluate for   evolution.    Heterogeneous liver with biliary stents. Cholangiocarcinoma is not well   delineated on this exam. Correlation with outside imaging and oncologic   history.    Indeterminate devices in the proximal duodenum. Comparison with outside   imaging and history is recommended.    Small to moderate abdominopelvic ascites.    < end of copied text >      ACCESS DEVICES:  [X] Peripheral IV       GENERAL SURGERY CONSULT NOTE    Patient: MARIAM IVORY , 68y (53)Male   MRN: [id number]  Location: [facility] ED  Visit: 24 Emergency  Date: 24 @ 06:26    HPI:  68 M pmh of DM and cholangiocarcinoma presents to the ED with 1 day of chills and fevers at home to 1002F. Patient diagnosed with cholangiocarcinoma May 2022 and subsequently had plastic stents placed in right and left hepatic ducts. In 2022 underwent right portal vein embolization with hopes of hepatectomy but widespread metastasis to liver excluded patient as surgical candidate. In 2022 started chemotherpy including gemcitabine, cisplatin, and durvalumab. 2023 had cholecystoduodenostomy after perc denys for cholecystitis. Has a peritoneal drain in place for recurrent ascites (1 L per day). Patient's last chemotherapy consisted of gemcitabine and durvalumab 5 days ago with plans from his oncologist Dr. Littlejohn at [facility] [place] to change to FOLFIRI. Upon arrival today patient with Tmax 100.3 with decreased PO intake but denies nausea/vomiting, diarrhea/constipation, yellowing of skin/eyes, chest pain, sob, dark/bloody stools, pale stools, sick contacts. Last intervention was 2024 when one of his biliary stents was exchanged due to obstruction.     PAST MEDICAL & SURGICAL HISTORY:  History of medical problems  dm type 2, bile duct cancer on chemo, cataracts, gerd, neuropathy (crawling sensation)      H/O inguinal hernia repair  right groin          Home Medications:  famotidine 20 mg oral tablet: 1 tab(s) orally once a day (at bedtime) (25 Mar 2024 09:39)  Farxiga 10 mg oral tablet: 1 tab(s) orally once a day (25 Mar 2024 09:38)  PARoxetine 10 mg oral tablet: 1 tab(s) orally once a day (25 Mar 2024 09:40)  pregabalin 100 mg oral capsule: 1 cap(s) orally 2 times a day (25 Mar 2024 09:40)  vitamin d3: weekly (25 Mar 2024 09:39)        VITALS:  T(F): 96.7 (24 @ 05:33), Max: 100.3 (24 @ 23:37)  HR: 60 (06-18-24 @ 05:33) (60 - 75)  BP: 114/53 (24 @ 05:33) (97/59 - 114/53)  RR: 18 (24 @ 05:33) (18 - 18)  SpO2: 98% (24 @ 05:33) (98% - 100%)    PHYSICAL EXAM:  General: NAD, AAOx3, calm and cooperative  HEENT: NCAT, LEATHA, EOMI, Trachea ML, Neck supple, no scleral icterus  Cardiac: RRR S1, S2, no Murmurs, rubs or gallops  Respiratory: CTAB, normal respiratory effort, breath sounds equal BL, no wheeze, rhonchi or crackles  Abdomen: Soft, non-distended, non-tender, tenkoff in place no erythema/edema/fluctuance at insertion site  skin: no appreciable rashes/jaundice    MEDICATIONS  (STANDING):    MEDICATIONS  (PRN):      LAB/STUDIES:                        8.1    2.48  )-----------( 166      ( 2024 00:50 )             23.4         129<L>  |  99  |  21<H>  ----------------------------<  231<H>  4.1   |  22  |  1.2    Ca    7.2<L>      2024 00:50    TPro  5.9<L>  /  Alb  2.2<L>  /  TBili  1.6<H>  /  DBili  x   /  AST  55<H>  /  ALT  30  /  AlkPhos  426<H>      PT/INR - ( 2024 00:50 )   PT: 16.20 sec;   INR: 1.42 ratio         PTT - ( 2024 00:50 )  PTT:27.7 sec  LIVER FUNCTIONS - ( 2024 00:50 )  Alb: 2.2 g/dL / Pro: 5.9 g/dL / ALK PHOS: 426 U/L / ALT: 30 U/L / AST: 55 U/L / GGT: x           Urinalysis Basic - ( 2024 04:56 )    Color: Yellow / Appearance: Clear / S.026 / pH: x  Gluc: x / Ketone: Negative mg/dL  / Bili: Negative / Urobili: 1.0 mg/dL   Blood: x / Protein: Trace mg/dL / Nitrite: Negative   Leuk Esterase: Negative / RBC: x / WBC x   Sq Epi: x / Non Sq Epi: x / Bacteria: x                  Urinalysis with Rflx Culture (collected 2024 04:56)      IMAGING:    < from: CT Abdomen and Pelvis w/ IV Cont (24 @ 02:28) >  IMPRESSION:    1.8 cm cystic hypoattenuating lesion in the right hepatic lobe with   slight surrounding enhancement. Additional adjacent subcentimeter similar   lesion. This is indeterminate for an infectious focus/abscess, cyst or   metastasis. Correlation with outside imaging is suggested. Given the   history of infection, short-term CT is suggested to evaluate for   evolution.    Heterogeneous liver with biliary stents. Cholangiocarcinoma is not well   delineated on this exam. Correlation with outside imaging and oncologic   history.    Indeterminate devices in the proximal duodenum. Comparison with outside   imaging and history is recommended.    Small to moderate abdominopelvic ascites.    < end of copied text >      ACCESS DEVICES:  [X] Peripheral IV

## 2024-06-18 NOTE — ED PROVIDER NOTE - CONSIDERATION OF ADMISSION OBSERVATION
Appropriate medications for patients presenting complaints were offered and effects were re-assessed Escalation to admission was considered. At this time patient requires inpatient hospitalization however patient transferred to [facility] for [place] of care. Consideration of Admission/Observation

## 2024-06-18 NOTE — ED PROVIDER NOTE - OBJECTIVE STATEMENT
68 years old male history of diabetes, bile duct cancer on chemotherapy (last dose 5 days ago for 48 hours from [facility] [place]) presents complaint fever started this evening.  Fever Tmax 102 tonight so son brought patient to ED for evaluation.  Patient otherwise denies headache, chest pain, abdominal pain, vomiting, diarrhea, change in appetite or urinary symptoms.  Denies known sick contact or recent travel.     Patient has a Mediport to the right side of his chest and a peritoneal drain to the right side of the abdomen that he drained at home periodically.

## 2024-06-18 NOTE — ED PROVIDER NOTE - CLINICAL SUMMARY MEDICAL DECISION MAKING FREE TEXT BOX
68 years old male history of diabetes, bile duct cancer on chemotherapy (last dose 5 days ago for 48 hours from [facility] [place] [facility]) presents complaint fever started this evening. vs reviewed labs imaging obtained and reviewed blood cultures obtained broad spectrum antibiotics given ct demonstrated 1.8 cm cystic hypoattenuating lesion in the right hepatic lobe with slight surrounding enhancement. Additional adjacent subcentimeter similar lesion. This is indeterminate for an infectious focus/abscess, cyst or metastasis. surgery and gi consulted, patient requested to be transferred to [facility] , transfer initiated and patient transferred.

## 2024-06-18 NOTE — ED PROVIDER NOTE - PROGRESS NOTE DETAILS
labs w/ abn lfts, ct ordered and results appreciated. case d/w surgery who rec transfer to [facility]. PA spoke to [facility] transfer center who approved transfer by Dr. Ramsey Eid. pt endorsed to Dr. Hernandez- monitor pending transfer Authored by Dr. Marquis Hernandez s/o received from dr. ross pending transport to [facility]

## 2024-06-18 NOTE — CONSULT NOTE ADULT - ATTENDING COMMENTS
Agree with the above.  Patient with cholangiocarcinoma, here with weakness and fevers.  CT results reviewed and concerning for a 1.8 cm hypoattenuating lesion in the right liver lobe suspicious for an abscess.  Patient prefers to get the rest of his care at [place] and will be transferred from ER to ER.  If patient remains hospitalized, he would benefit from IR evaluation of his right hepatic lobe lesion and may require ERCP for possible stent exchange.  Rest of recommendations per the note above. Agree with the above.  Patient with cholangiocarcinoma, here with weakness and fevers.  CT results reviewed and concerning for a 1.8 cm hypoattenuating lesion in the right liver lobe suspicious for an abscess.  Patient prefers to get the rest of his care at [facility] and will be transferred from ER to ER.  If patient remains here, he would benefit from IR evaluation of his right hepatic lobe lesion (? Abscess) and ERCP w/ stent exchange.  Rest of recommendations per the note above.

## 2024-06-18 NOTE — ED PROVIDER NOTE - PHYSICAL EXAMINATION
CONSTITUTIONAL: Well-appearing; ; in no apparent distress.   EYES: PERRL; EOM intact.   CARDIOVASCULAR: Normal S1, S2; no murmurs, rubs, or gallops.   RESPIRATORY: Normal chest excursion with respiration; breath sounds clear and equal bilaterally; no wheezes, rhonchi, or rales.  GI/: Normal bowel sounds; non-distended; non-tender; no palpable organomegaly.  Drain to right upper quadrant.  Wound covered in clean dry dressing.  MS: No calf swelling and tenderness.  SKIN: No rash.  NEURO/PSYCH: A & O x 3; grossly unremarkable.  Speaking coherently and moving all extremities.

## 2024-06-18 NOTE — CONSULT NOTE ADULT - ASSESSMENT
Patient is a pleasant 68-year-old male with a past medical history of diabetes, cataracts, neuropathy, and cholangiocarcinoma who is followed primarily at [facility] in [place] who has been following him since May 2022 for cholangiocarcinoma in which she was deemed not a surgical candidate because of extensive metastatic disease.  Patient has had prior common bile duct and hepatic duct stenting.  Patient at one point was considered for a hepatectomy but mets excluded him from this.  Patient has been on chemotherapy and also required a cholecystoduodenostomy per Chanelle for additional drainage.  Patient currently is relatively at baseline.  Patient has some fatigue.  Patient does not have current abdominal pain.  Patient present with son in the ER.    Patient and family have expectations of being transferred today to [facility].  Patient and family request this given they have been followed closely by them for the last 3 years.  Son reached out to team already at [facility] and is expecting transfer this morning.    Cholangiocarcinoma with Mets  - Currently stable but has unfortunate Diagnosis  - Given close care over the last few years at [facility] it is a reasonable request by both patient and family to be transferred there  - No plan for Advanced intervention at [facility]  - Follows Dr Sanchez- Advanced GI at [facility] along with Heme Onc team there   - Re-Call if transfer unsuccessful- prognosis overall poor given mets and years since Dx
68 M pmh of DM and cholangiocarcinoma presents to the ED with 1 day of chills. Patient diagnosed with cholangiocarcinoma May 2022 and subsequently had plastic stents placed in right and left hepatic ducts. In June 2022 underwent right portal vein embolization with hopes of hepatectomy but widespread metastasis to liver excluded patient as surgical candidate. In August of 2022 started chemotherpy including gemcitabine, cisplatin, and durvalumab. March 2023 had cholecystoduodenostomy after perc denys for cholecystetitis. Has a peritoneal drain in place for recurrent ascites (1 L per day). Patient's last chemotherapy consisted of gemcitabine and durvalumab 5 days ago with plans from his oncologist Dr. Littlejohn at [facility] to change to FOLFIRI. Upon arrival today patient with Tmax 100.3 with decreased PO intake but denies fevers at home, nausea/vomiting, diarrhea/constipation, yellowing of skin/eyes, chest pain, sob, dark/bloody stools, pale stools. Last intervention was March 2024 when one of his biliary stents was exchanged due to obstruction.     Plan    Zosyn  NPO, IVF  Will likely need EGD/cholangiogram to r/o obstruction of right hepatic stent  Correlation of current CT with prior to assess for hepatic abscess formation  Possible biliary decompression by interventional radiology/advanced GI to treat cholangitis  Transfer to [facility] for further management by patient's advanced GI team (Dr. Sanchez) and oncology team (Dr. Littlejohn)    [id number]

## 2024-06-20 LAB
-  AMPICILLIN/SULBACTAM: SIGNIFICANT CHANGE UP
-  AMPICILLIN: SIGNIFICANT CHANGE UP
-  AZTREONAM: SIGNIFICANT CHANGE UP
-  CEFAZOLIN: SIGNIFICANT CHANGE UP
-  CEFEPIME: SIGNIFICANT CHANGE UP
-  CEFOXITIN: SIGNIFICANT CHANGE UP
-  CEFTRIAXONE: SIGNIFICANT CHANGE UP
-  CIPROFLOXACIN: SIGNIFICANT CHANGE UP
-  ERTAPENEM: SIGNIFICANT CHANGE UP
-  GENTAMICIN: SIGNIFICANT CHANGE UP
-  IMIPENEM: SIGNIFICANT CHANGE UP
-  LEVOFLOXACIN: SIGNIFICANT CHANGE UP
-  MEROPENEM: SIGNIFICANT CHANGE UP
-  PIPERACILLIN/TAZOBACTAM: SIGNIFICANT CHANGE UP
-  TOBRAMYCIN: SIGNIFICANT CHANGE UP
-  TRIMETHOPRIM/SULFAMETHOXAZOLE: SIGNIFICANT CHANGE UP
CULTURE RESULTS: ABNORMAL
METHOD TYPE: SIGNIFICANT CHANGE UP
ORGANISM # SPEC MICROSCOPIC CNT: ABNORMAL
ORGANISM # SPEC MICROSCOPIC CNT: ABNORMAL
ORGANISM # SPEC MICROSCOPIC CNT: SIGNIFICANT CHANGE UP
SPECIMEN SOURCE: SIGNIFICANT CHANGE UP

## 2024-06-23 LAB
CULTURE RESULTS: SIGNIFICANT CHANGE UP
SPECIMEN SOURCE: SIGNIFICANT CHANGE UP

## 2024-09-20 ENCOUNTER — APPOINTMENT (OUTPATIENT)
Facility: CLINIC | Age: 69
End: 2024-09-20

## 2024-09-20 ENCOUNTER — NON-APPOINTMENT (OUTPATIENT)
Age: 69
End: 2024-09-20

## 2024-09-20 ENCOUNTER — APPOINTMENT (OUTPATIENT)
Dept: NEUROLOGY | Facility: CLINIC | Age: 69
End: 2024-09-20
Payer: MEDICARE

## 2024-09-20 VITALS
DIASTOLIC BLOOD PRESSURE: 56 MMHG | HEART RATE: 64 BPM | WEIGHT: 123 LBS | BODY MASS INDEX: 19.77 KG/M2 | SYSTOLIC BLOOD PRESSURE: 108 MMHG | HEIGHT: 66 IN

## 2024-09-20 DIAGNOSIS — R20.2 PARESTHESIA OF SKIN: ICD-10-CM

## 2024-09-20 PROCEDURE — 99214 OFFICE O/P EST MOD 30 MIN: CPT | Mod: 25

## 2024-09-20 PROCEDURE — 66761 REVISION OF IRIS: CPT | Mod: RT

## 2024-09-20 PROCEDURE — 92020 GONIOSCOPY: CPT

## 2024-09-20 PROCEDURE — 92134 CPTRZ OPH DX IMG PST SGM RTA: CPT

## 2024-09-20 PROCEDURE — 99203 OFFICE O/P NEW LOW 30 MIN: CPT

## 2024-09-20 RX ORDER — PREGABALIN 300 MG/1
CAPSULE ORAL
Refills: 0 | Status: ACTIVE | COMMUNITY

## 2024-09-20 RX ORDER — DAPAGLIFLOZIN 10 MG/1
TABLET, FILM COATED ORAL
Refills: 0 | Status: ACTIVE | COMMUNITY

## 2024-09-20 RX ORDER — PANTOPRAZOLE SODIUM 40 MG/1
GRANULE, DELAYED RELEASE ORAL
Refills: 0 | Status: ACTIVE | COMMUNITY

## 2024-09-21 ENCOUNTER — EMERGENCY (EMERGENCY)
Facility: HOSPITAL | Age: 69
LOS: 0 days | Discharge: HOME | End: 2024-09-22
Attending: STUDENT IN AN ORGANIZED HEALTH CARE EDUCATION/TRAINING PROGRAM
Payer: MEDICARE

## 2024-09-21 VITALS
TEMPERATURE: 98 F | DIASTOLIC BLOOD PRESSURE: 75 MMHG | SYSTOLIC BLOOD PRESSURE: 170 MMHG | HEART RATE: 76 BPM | RESPIRATION RATE: 19 BRPM | WEIGHT: 123.02 LBS | OXYGEN SATURATION: 99 %

## 2024-09-21 DIAGNOSIS — Z88.1 ALLERGY STATUS TO OTHER ANTIBIOTIC AGENTS: ICD-10-CM

## 2024-09-21 DIAGNOSIS — Z98.890 OTHER SPECIFIED POSTPROCEDURAL STATES: Chronic | ICD-10-CM

## 2024-09-21 DIAGNOSIS — H57.11 OCULAR PAIN, RIGHT EYE: ICD-10-CM

## 2024-09-21 DIAGNOSIS — H40.9 UNSPECIFIED GLAUCOMA: ICD-10-CM

## 2024-09-21 DIAGNOSIS — H53.8 OTHER VISUAL DISTURBANCES: ICD-10-CM

## 2024-09-21 DIAGNOSIS — S05.01XA INJURY OF CONJUNCTIVA AND CORNEAL ABRASION WITHOUT FOREIGN BODY, RIGHT EYE, INITIAL ENCOUNTER: ICD-10-CM

## 2024-09-21 DIAGNOSIS — E11.9 TYPE 2 DIABETES MELLITUS WITHOUT COMPLICATIONS: ICD-10-CM
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
## 2024-09-21 DIAGNOSIS — R11.0 NAUSEA: ICD-10-CM
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
## 2024-09-21 LAB
BASOPHILS # BLD AUTO: 0.03 K/UL — SIGNIFICANT CHANGE UP (ref 0–0.2)
BASOPHILS NFR BLD AUTO: 0.4 % — SIGNIFICANT CHANGE UP (ref 0–1)
EOSINOPHIL # BLD AUTO: 0.04 K/UL — SIGNIFICANT CHANGE UP (ref 0–0.7)
EOSINOPHIL NFR BLD AUTO: 0.5 % — SIGNIFICANT CHANGE UP (ref 0–8)
HCT VFR BLD CALC: 26.2 % — LOW (ref 42–52)
HGB BLD-MCNC: 8.6 G/DL — LOW (ref 14–18)
IMM GRANULOCYTES NFR BLD AUTO: 0.2 % — SIGNIFICANT CHANGE UP (ref 0.1–0.3)
LYMPHOCYTES # BLD AUTO: 0.94 K/UL — LOW (ref 1.2–3.4)
LYMPHOCYTES # BLD AUTO: 11.2 % — LOW (ref 20.5–51.1)
MCHC RBC-ENTMCNC: 27.8 PG — SIGNIFICANT CHANGE UP (ref 27–31)
MCHC RBC-ENTMCNC: 32.8 G/DL — SIGNIFICANT CHANGE UP (ref 32–37)
MCV RBC AUTO: 84.8 FL — SIGNIFICANT CHANGE UP (ref 80–94)
MONOCYTES # BLD AUTO: 0.21 K/UL — SIGNIFICANT CHANGE UP (ref 0.1–0.6)
MONOCYTES NFR BLD AUTO: 2.5 % — SIGNIFICANT CHANGE UP (ref 1.7–9.3)
NEUTROPHILS # BLD AUTO: 7.18 K/UL — HIGH (ref 1.4–6.5)
NEUTROPHILS NFR BLD AUTO: 85.2 % — HIGH (ref 42.2–75.2)
NRBC # BLD: 0 /100 WBCS — SIGNIFICANT CHANGE UP (ref 0–0)
NRBC BLD-RTO: 0 /100 WBCS — SIGNIFICANT CHANGE UP (ref 0–0)
PLATELET # BLD AUTO: 267 K/UL — SIGNIFICANT CHANGE UP (ref 130–400)
PMV BLD: 12.1 FL — HIGH (ref 7.4–10.4)
RBC # BLD: 3.09 M/UL — LOW (ref 4.7–6.1)
RBC # FLD: 14.8 % — HIGH (ref 11.5–14.5)
WBC # BLD: 8.42 K/UL — SIGNIFICANT CHANGE UP (ref 4.8–10.8)
WBC # FLD AUTO: 8.42 K/UL — SIGNIFICANT CHANGE UP (ref 4.8–10.8)

## 2024-09-21 PROCEDURE — 84132 ASSAY OF SERUM POTASSIUM: CPT

## 2024-09-21 PROCEDURE — 96375 TX/PRO/DX INJ NEW DRUG ADDON: CPT

## 2024-09-21 PROCEDURE — 36415 COLL VENOUS BLD VENIPUNCTURE: CPT

## 2024-09-21 PROCEDURE — 85014 HEMATOCRIT: CPT

## 2024-09-21 PROCEDURE — 80053 COMPREHEN METABOLIC PANEL: CPT

## 2024-09-21 PROCEDURE — 84295 ASSAY OF SERUM SODIUM: CPT

## 2024-09-21 PROCEDURE — 85025 COMPLETE CBC W/AUTO DIFF WBC: CPT

## 2024-09-21 PROCEDURE — 82330 ASSAY OF CALCIUM: CPT

## 2024-09-21 PROCEDURE — 96374 THER/PROPH/DIAG INJ IV PUSH: CPT

## 2024-09-21 PROCEDURE — 96376 TX/PRO/DX INJ SAME DRUG ADON: CPT

## 2024-09-21 PROCEDURE — 82803 BLOOD GASES ANY COMBINATION: CPT

## 2024-09-21 PROCEDURE — 85018 HEMOGLOBIN: CPT

## 2024-09-21 PROCEDURE — 80048 BASIC METABOLIC PNL TOTAL CA: CPT

## 2024-09-21 PROCEDURE — 83605 ASSAY OF LACTIC ACID: CPT

## 2024-09-21 PROCEDURE — 99284 EMERGENCY DEPT VISIT MOD MDM: CPT | Mod: 25

## 2024-09-21 PROCEDURE — 99284 EMERGENCY DEPT VISIT MOD MDM: CPT

## 2024-09-21 RX ORDER — TIMOLOL MALEATE 6.8 MG/ML
1 SOLUTION OPHTHALMIC ONCE
Refills: 0 | Status: COMPLETED | OUTPATIENT
Start: 2024-09-21 | End: 2024-09-21

## 2024-09-21 RX ORDER — BRIMONIDINE TARTRATE 1.5 MG/ML
1 SOLUTION/ DROPS OPHTHALMIC ONCE
Refills: 0 | Status: DISCONTINUED | OUTPATIENT
Start: 2024-09-21 | End: 2024-09-22

## 2024-09-21 RX ORDER — ACETAMINOPHEN 500 MG/5ML
650 LIQUID (ML) ORAL ONCE
Refills: 0 | Status: COMPLETED | OUTPATIENT
Start: 2024-09-21 | End: 2024-09-21

## 2024-09-21 RX ORDER — BRIMONIDINE TARTRATE 1.5 MG/ML
1 SOLUTION/ DROPS OPHTHALMIC ONCE
Refills: 0 | Status: COMPLETED | OUTPATIENT
Start: 2024-09-21 | End: 2024-09-21

## 2024-09-21 RX ORDER — DORZOLAMIDE 20 MG/ML
1 SOLUTION/ DROPS OPHTHALMIC ONCE
Refills: 0 | Status: COMPLETED | OUTPATIENT
Start: 2024-09-21 | End: 2024-09-21

## 2024-09-21 RX ORDER — DORZOLAMIDE 20 MG/ML
1 SOLUTION/ DROPS OPHTHALMIC ONCE
Refills: 0 | Status: DISCONTINUED | OUTPATIENT
Start: 2024-09-21 | End: 2024-09-22

## 2024-09-21 RX ORDER — METOCLOPRAMIDE HCL 10 MG
10 TABLET ORAL ONCE
Refills: 0 | Status: COMPLETED | OUTPATIENT
Start: 2024-09-21 | End: 2024-09-21

## 2024-09-21 RX ORDER — TIMOLOL MALEATE 6.8 MG/ML
1 SOLUTION OPHTHALMIC ONCE
Refills: 0 | Status: DISCONTINUED | OUTPATIENT
Start: 2024-09-21 | End: 2024-09-22

## 2024-09-21 RX ORDER — LATANOPROST PF 0.05 MG/ML
1 SOLUTION/ DROPS OPHTHALMIC AT BEDTIME
Refills: 0 | Status: DISCONTINUED | OUTPATIENT
Start: 2024-09-21 | End: 2024-09-22

## 2024-09-21 RX ORDER — ACETAZOLAMIDE 250 MG/1
1000 TABLET ORAL ONCE
Refills: 0 | Status: DISCONTINUED | OUTPATIENT
Start: 2024-09-21 | End: 2024-09-21

## 2024-09-21 RX ORDER — ACETAZOLAMIDE 250 MG/1
500 TABLET ORAL ONCE
Refills: 0 | Status: COMPLETED | OUTPATIENT
Start: 2024-09-21 | End: 2024-09-21

## 2024-09-21 RX ADMIN — Medication 650 MILLIGRAM(S): at 20:41

## 2024-09-21 RX ADMIN — Medication 10 MILLIGRAM(S): at 21:30

## 2024-09-21 RX ADMIN — TIMOLOL MALEATE 1 DROP(S): 6.8 SOLUTION OPHTHALMIC at 21:30

## 2024-09-21 RX ADMIN — Medication 4 MILLIGRAM(S): at 21:30

## 2024-09-21 RX ADMIN — ACETAZOLAMIDE 110 MILLIGRAM(S): 250 TABLET ORAL at 23:27

## 2024-09-21 RX ADMIN — BRIMONIDINE TARTRATE 1 DROP(S): 1.5 SOLUTION/ DROPS OPHTHALMIC at 21:30

## 2024-09-21 RX ADMIN — LATANOPROST PF 1 DROP(S): 0.05 SOLUTION/ DROPS OPHTHALMIC at 21:30

## 2024-09-21 RX ADMIN — ACETAZOLAMIDE 110 MILLIGRAM(S): 250 TABLET ORAL at 21:30

## 2024-09-21 RX ADMIN — DORZOLAMIDE 1 DROP(S): 20 SOLUTION/ DROPS OPHTHALMIC at 21:30

## 2024-09-22 VITALS
DIASTOLIC BLOOD PRESSURE: 61 MMHG | TEMPERATURE: 98 F | SYSTOLIC BLOOD PRESSURE: 150 MMHG | OXYGEN SATURATION: 100 % | RESPIRATION RATE: 18 BRPM

## 2024-09-22 LAB
ALBUMIN SERPL ELPH-MCNC: 2.4 G/DL — LOW (ref 3.5–5.2)
ALP SERPL-CCNC: 149 U/L — HIGH (ref 30–115)
ALT FLD-CCNC: 28 U/L — SIGNIFICANT CHANGE UP (ref 0–41)
ANION GAP SERPL CALC-SCNC: 6 MMOL/L — LOW (ref 7–14)
ANION GAP SERPL CALC-SCNC: 8 MMOL/L — SIGNIFICANT CHANGE UP (ref 7–14)
AST SERPL-CCNC: 85 U/L — HIGH (ref 0–41)
BASE EXCESS BLDV CALC-SCNC: -3.8 MMOL/L — LOW (ref -2–3)
BILIRUB SERPL-MCNC: 1.1 MG/DL — SIGNIFICANT CHANGE UP (ref 0.2–1.2)
BUN SERPL-MCNC: 28 MG/DL — HIGH (ref 10–20)
BUN SERPL-MCNC: 29 MG/DL — HIGH (ref 10–20)
CA-I SERPL-SCNC: 1.18 MMOL/L — SIGNIFICANT CHANGE UP (ref 1.15–1.33)
CALCIUM SERPL-MCNC: 7.3 MG/DL — LOW (ref 8.4–10.5)
CALCIUM SERPL-MCNC: 7.7 MG/DL — LOW (ref 8.4–10.5)
CHLORIDE SERPL-SCNC: 101 MMOL/L — SIGNIFICANT CHANGE UP (ref 98–110)
CHLORIDE SERPL-SCNC: 103 MMOL/L — SIGNIFICANT CHANGE UP (ref 98–110)
CO2 SERPL-SCNC: 21 MMOL/L — SIGNIFICANT CHANGE UP (ref 17–32)
CO2 SERPL-SCNC: 21 MMOL/L — SIGNIFICANT CHANGE UP (ref 17–32)
CREAT SERPL-MCNC: 1.7 MG/DL — HIGH (ref 0.7–1.5)
CREAT SERPL-MCNC: 1.7 MG/DL — HIGH (ref 0.7–1.5)
EGFR: 43 ML/MIN/1.73M2 — LOW
GAS PNL BLDV: 131 MMOL/L — LOW (ref 136–145)
GAS PNL BLDV: SIGNIFICANT CHANGE UP
GAS PNL BLDV: SIGNIFICANT CHANGE UP
GLUCOSE SERPL-MCNC: 145 MG/DL — HIGH (ref 70–99)
GLUCOSE SERPL-MCNC: 198 MG/DL — HIGH (ref 70–99)
HCO3 BLDV-SCNC: 23 MMOL/L — SIGNIFICANT CHANGE UP (ref 22–29)
HCT VFR BLDA CALC: 32 % — LOW (ref 39–51)
HGB BLD CALC-MCNC: 10.5 G/DL — LOW (ref 12.6–17.4)
LACTATE BLDV-MCNC: 1.3 MMOL/L — SIGNIFICANT CHANGE UP (ref 0.5–2)
PCO2 BLDV: 46 MMHG — SIGNIFICANT CHANGE UP (ref 42–55)
PH BLDV: 7.3 — LOW (ref 7.32–7.43)
PO2 BLDV: 19 MMHG — LOW (ref 25–45)
POTASSIUM BLDV-SCNC: 4.7 MMOL/L — SIGNIFICANT CHANGE UP (ref 3.5–5.1)
POTASSIUM SERPL-MCNC: 5.8 MMOL/L — HIGH (ref 3.5–5)
POTASSIUM SERPL-MCNC: 6.4 MMOL/L — CRITICAL HIGH (ref 3.5–5)
POTASSIUM SERPL-SCNC: 5.8 MMOL/L — HIGH (ref 3.5–5)
POTASSIUM SERPL-SCNC: 6.4 MMOL/L — CRITICAL HIGH (ref 3.5–5)
PROT SERPL-MCNC: 7 G/DL — SIGNIFICANT CHANGE UP (ref 6–8)
SAO2 % BLDV: 20.6 % — LOW (ref 67–88)
SODIUM SERPL-SCNC: 128 MMOL/L — LOW (ref 135–146)
SODIUM SERPL-SCNC: 132 MMOL/L — LOW (ref 135–146)

## 2024-09-22 NOTE — ASSESSMENT
[FreeTextEntry1] : diffuse parethesia/pruitis associated with skin lesions -not likely to be primary neurological in origin.  I told them that I could send them for EMG/NCS of the extremities and additional blood work, however, the chance of finding the cause is low.  they decide to hold off on the testing at this time  Total clinician time spent  is  32 minutes including preparing to see the patient, obtaining and/or reviewing and confirming history, performing a medically necessary and appropriate examination, counseling and educating the patient and/or family, documenting clinical information in the HER and communicating and/or referring to other healthcare professionals.

## 2024-09-22 NOTE — HISTORY OF PRESENT ILLNESS
[FreeTextEntry1] : It's a pleasure to see . MARIAM IOVRY In the office today. He is a 68 year -  old man  who presents to the office today for the evaluation of paresthesia throughout.  according to wife who gave most of the history.  Patient has had diffuse parethesia and pruitis for many years without a clear diagnosis and effective treatment.  There was skin lesion that started in the left scapula region which has been examined and biopsied but was not diagnostic.  He has seen multiple specialists over the years most organized and referred by his daughter who is a dermatologist.  This is his first time seeing a neurologist.  He denies any neck/back pain shooting down his extremities and denies any weakness/numbness/urinary/bowel incontinence.

## 2024-09-22 NOTE — PHYSICAL EXAM
[Person] : oriented to person [Place] : oriented to place [Time] : oriented to time [Concentration Intact] : normal concentrating ability [Visual Intact] : visual attention was ~T not ~L decreased [Naming Objects] : no difficulty naming common objects [Repeating Phrases] : no difficulty repeating a phrase [Writing A Sentence] : no difficulty writing a sentence [Fluency] : fluency intact [Comprehension] : comprehension intact [Past History] : adequate knowledge of personal past history [Reading] : reading intact [Cranial Nerves Optic (II)] : visual acuity intact bilaterally,  visual fields full to confrontation, pupils equal round and reactive to light [Cranial Nerves Oculomotor (III)] : extraocular motion intact [Cranial Nerves Trigeminal (V)] : facial sensation intact symmetrically [Cranial Nerves Facial (VII)] : face symmetrical [Cranial Nerves Glossopharyngeal (IX)] : tongue and palate midline [Cranial Nerves Vestibulocochlear (VIII)] : hearing was intact bilaterally [Cranial Nerves Accessory (XI - Cranial And Spinal)] : head turning and shoulder shrug symmetric [Cranial Nerves Hypoglossal (XII)] : there was no tongue deviation with protrusion [Motor Tone] : muscle tone was normal in all four extremities [Motor Strength] : muscle strength was normal in all four extremities [No Muscle Atrophy] : normal bulk in all four extremities [Abnormal Walk] : normal gait [Sensation Tactile Decrease] : light touch was intact [Balance] : balance was intact [Past-pointing] : there was no past-pointing [Tremor] : no tremor present [2+] : Ankle jerk left 2+ [Plantar Reflex Right Only] : normal on the right [Plantar Reflex Left Only] : normal on the left

## 2024-10-07 ENCOUNTER — APPOINTMENT (OUTPATIENT)
Facility: CLINIC | Age: 69
End: 2024-10-07

## 2024-11-24 ENCOUNTER — INPATIENT (INPATIENT)
Facility: HOSPITAL | Age: 69
LOS: 9 days | Discharge: ANOTHER HOSPITAL | DRG: 871 | End: 2024-12-04
Attending: INTERNAL MEDICINE | Admitting: INTERNAL MEDICINE
Payer: MEDICARE

## 2024-11-24 VITALS
DIASTOLIC BLOOD PRESSURE: 72 MMHG | TEMPERATURE: 97 F | SYSTOLIC BLOOD PRESSURE: 118 MMHG | RESPIRATION RATE: 17 BRPM | OXYGEN SATURATION: 98 % | HEART RATE: 88 BPM

## 2024-11-24 DIAGNOSIS — Z98.890 OTHER SPECIFIED POSTPROCEDURAL STATES: Chronic | ICD-10-CM

## 2024-11-24 DIAGNOSIS — R41.0 DISORIENTATION, UNSPECIFIED: ICD-10-CM

## 2024-11-24 LAB
ALBUMIN SERPL ELPH-MCNC: 2.1 G/DL — LOW (ref 3.5–5.2)
ALP SERPL-CCNC: 258 U/L — HIGH (ref 30–115)
ALT FLD-CCNC: 16 U/L — SIGNIFICANT CHANGE UP (ref 0–41)
AMMONIA BLD-MCNC: 71 UMOL/L — HIGH (ref 11–55)
ANION GAP SERPL CALC-SCNC: 10 MMOL/L — SIGNIFICANT CHANGE UP (ref 7–14)
APPEARANCE UR: CLEAR — SIGNIFICANT CHANGE UP
APTT BLD: 34.6 SEC — SIGNIFICANT CHANGE UP (ref 27–39.2)
AST SERPL-CCNC: 37 U/L — SIGNIFICANT CHANGE UP (ref 0–41)
BASOPHILS # BLD AUTO: 0.06 K/UL — SIGNIFICANT CHANGE UP (ref 0–0.2)
BASOPHILS NFR BLD AUTO: 0.7 % — SIGNIFICANT CHANGE UP (ref 0–1)
BILIRUB SERPL-MCNC: 1.1 MG/DL — SIGNIFICANT CHANGE UP (ref 0.2–1.2)
BILIRUB UR-MCNC: NEGATIVE — SIGNIFICANT CHANGE UP
BUN SERPL-MCNC: 34 MG/DL — HIGH (ref 10–20)
CALCIUM SERPL-MCNC: 7.8 MG/DL — LOW (ref 8.4–10.5)
CHLORIDE SERPL-SCNC: 104 MMOL/L — SIGNIFICANT CHANGE UP (ref 98–110)
CO2 SERPL-SCNC: 18 MMOL/L — SIGNIFICANT CHANGE UP (ref 17–32)
COLOR SPEC: YELLOW — SIGNIFICANT CHANGE UP
CREAT SERPL-MCNC: 1.8 MG/DL — HIGH (ref 0.7–1.5)
DIFF PNL FLD: NEGATIVE — SIGNIFICANT CHANGE UP
EGFR: 40 ML/MIN/1.73M2 — LOW
EOSINOPHIL # BLD AUTO: 0.19 K/UL — SIGNIFICANT CHANGE UP (ref 0–0.7)
EOSINOPHIL NFR BLD AUTO: 2.3 % — SIGNIFICANT CHANGE UP (ref 0–8)
FLUAV AG NPH QL: SIGNIFICANT CHANGE UP
FLUBV AG NPH QL: SIGNIFICANT CHANGE UP
GLUCOSE BLDC GLUCOMTR-MCNC: 155 MG/DL — HIGH (ref 70–99)
GLUCOSE SERPL-MCNC: 139 MG/DL — HIGH (ref 70–99)
GLUCOSE UR QL: >=1000 MG/DL
HCT VFR BLD CALC: 24.2 % — LOW (ref 42–52)
HGB BLD-MCNC: 8 G/DL — LOW (ref 14–18)
IMM GRANULOCYTES NFR BLD AUTO: 0.6 % — HIGH (ref 0.1–0.3)
INR BLD: 1.15 RATIO — SIGNIFICANT CHANGE UP (ref 0.65–1.3)
KETONES UR-MCNC: NEGATIVE MG/DL — SIGNIFICANT CHANGE UP
LACTATE SERPL-SCNC: 3.9 MMOL/L — HIGH (ref 0.7–2)
LEUKOCYTE ESTERASE UR-ACNC: NEGATIVE — SIGNIFICANT CHANGE UP
LYMPHOCYTES # BLD AUTO: 1.53 K/UL — SIGNIFICANT CHANGE UP (ref 1.2–3.4)
LYMPHOCYTES # BLD AUTO: 18.8 % — LOW (ref 20.5–51.1)
MCHC RBC-ENTMCNC: 28.4 PG — SIGNIFICANT CHANGE UP (ref 27–31)
MCHC RBC-ENTMCNC: 33.1 G/DL — SIGNIFICANT CHANGE UP (ref 32–37)
MCV RBC AUTO: 85.8 FL — SIGNIFICANT CHANGE UP (ref 80–94)
MONOCYTES # BLD AUTO: 0.84 K/UL — HIGH (ref 0.1–0.6)
MONOCYTES NFR BLD AUTO: 10.3 % — HIGH (ref 1.7–9.3)
NEUTROPHILS # BLD AUTO: 5.46 K/UL — SIGNIFICANT CHANGE UP (ref 1.4–6.5)
NEUTROPHILS NFR BLD AUTO: 67.3 % — SIGNIFICANT CHANGE UP (ref 42.2–75.2)
NITRITE UR-MCNC: NEGATIVE — SIGNIFICANT CHANGE UP
NRBC # BLD: 0 /100 WBCS — SIGNIFICANT CHANGE UP (ref 0–0)
PH UR: 6 — SIGNIFICANT CHANGE UP (ref 5–8)
PLATELET # BLD AUTO: 220 K/UL — SIGNIFICANT CHANGE UP (ref 130–400)
PMV BLD: 11.9 FL — HIGH (ref 7.4–10.4)
POTASSIUM SERPL-MCNC: 4.5 MMOL/L — SIGNIFICANT CHANGE UP (ref 3.5–5)
POTASSIUM SERPL-SCNC: 4.5 MMOL/L — SIGNIFICANT CHANGE UP (ref 3.5–5)
PROT SERPL-MCNC: 7.7 G/DL — SIGNIFICANT CHANGE UP (ref 6–8)
PROT UR-MCNC: 30 MG/DL
PROTHROM AB SERPL-ACNC: 13.6 SEC — HIGH (ref 9.95–12.87)
RBC # BLD: 2.82 M/UL — LOW (ref 4.7–6.1)
RBC # FLD: 15.3 % — HIGH (ref 11.5–14.5)
RSV RNA NPH QL NAA+NON-PROBE: SIGNIFICANT CHANGE UP
SARS-COV-2 RNA SPEC QL NAA+PROBE: SIGNIFICANT CHANGE UP
SODIUM SERPL-SCNC: 132 MMOL/L — LOW (ref 135–146)
SP GR SPEC: 1.03 — SIGNIFICANT CHANGE UP (ref 1–1.03)
UROBILINOGEN FLD QL: 1 MG/DL — SIGNIFICANT CHANGE UP (ref 0.2–1)
WBC # BLD: 8.13 K/UL — SIGNIFICANT CHANGE UP (ref 4.8–10.8)
WBC # FLD AUTO: 8.13 K/UL — SIGNIFICANT CHANGE UP (ref 4.8–10.8)

## 2024-11-24 PROCEDURE — 82945 GLUCOSE OTHER FLUID: CPT

## 2024-11-24 PROCEDURE — 99285 EMERGENCY DEPT VISIT HI MDM: CPT | Mod: FS

## 2024-11-24 PROCEDURE — 85025 COMPLETE CBC W/AUTO DIFF WBC: CPT

## 2024-11-24 PROCEDURE — 87086 URINE CULTURE/COLONY COUNT: CPT

## 2024-11-24 PROCEDURE — 82550 ASSAY OF CK (CPK): CPT

## 2024-11-24 PROCEDURE — P9047: CPT

## 2024-11-24 PROCEDURE — 87040 BLOOD CULTURE FOR BACTERIA: CPT

## 2024-11-24 PROCEDURE — 99223 1ST HOSP IP/OBS HIGH 75: CPT

## 2024-11-24 PROCEDURE — 89051 BODY FLUID CELL COUNT: CPT

## 2024-11-24 PROCEDURE — 76770 US EXAM ABDO BACK WALL COMP: CPT

## 2024-11-24 PROCEDURE — 80053 COMPREHEN METABOLIC PANEL: CPT

## 2024-11-24 PROCEDURE — 82962 GLUCOSE BLOOD TEST: CPT

## 2024-11-24 PROCEDURE — 87070 CULTURE OTHR SPECIMN AEROBIC: CPT

## 2024-11-24 PROCEDURE — 81001 URINALYSIS AUTO W/SCOPE: CPT

## 2024-11-24 PROCEDURE — 93307 TTE W/O DOPPLER COMPLETE: CPT

## 2024-11-24 PROCEDURE — 85027 COMPLETE CBC AUTOMATED: CPT

## 2024-11-24 PROCEDURE — 87186 SC STD MICRODIL/AGAR DIL: CPT

## 2024-11-24 PROCEDURE — 74177 CT ABD & PELVIS W/CONTRAST: CPT | Mod: 26,MC

## 2024-11-24 PROCEDURE — 84145 PROCALCITONIN (PCT): CPT

## 2024-11-24 PROCEDURE — 71045 X-RAY EXAM CHEST 1 VIEW: CPT | Mod: 26

## 2024-11-24 PROCEDURE — 82570 ASSAY OF URINE CREATININE: CPT

## 2024-11-24 PROCEDURE — 93970 EXTREMITY STUDY: CPT

## 2024-11-24 PROCEDURE — 87102 FUNGUS ISOLATION CULTURE: CPT

## 2024-11-24 PROCEDURE — 83036 HEMOGLOBIN GLYCOSYLATED A1C: CPT

## 2024-11-24 PROCEDURE — 87075 CULTR BACTERIA EXCEPT BLOOD: CPT

## 2024-11-24 PROCEDURE — 70450 CT HEAD/BRAIN W/O DYE: CPT | Mod: 26,MC

## 2024-11-24 PROCEDURE — 84540 ASSAY OF URINE/UREA-N: CPT

## 2024-11-24 PROCEDURE — 87015 SPECIMEN INFECT AGNT CONCNTJ: CPT

## 2024-11-24 PROCEDURE — 84156 ASSAY OF PROTEIN URINE: CPT

## 2024-11-24 PROCEDURE — 36415 COLL VENOUS BLD VENIPUNCTURE: CPT

## 2024-11-24 PROCEDURE — 84300 ASSAY OF URINE SODIUM: CPT

## 2024-11-24 PROCEDURE — 86900 BLOOD TYPING SEROLOGIC ABO: CPT

## 2024-11-24 PROCEDURE — 86022 PLATELET ANTIBODIES: CPT

## 2024-11-24 PROCEDURE — 86901 BLOOD TYPING SEROLOGIC RH(D): CPT

## 2024-11-24 PROCEDURE — 86850 RBC ANTIBODY SCREEN: CPT

## 2024-11-24 PROCEDURE — 83605 ASSAY OF LACTIC ACID: CPT

## 2024-11-24 PROCEDURE — 83615 LACTATE (LD) (LDH) ENZYME: CPT

## 2024-11-24 PROCEDURE — 84133 ASSAY OF URINE POTASSIUM: CPT

## 2024-11-24 PROCEDURE — 84100 ASSAY OF PHOSPHORUS: CPT

## 2024-11-24 PROCEDURE — 87205 SMEAR GRAM STAIN: CPT

## 2024-11-24 PROCEDURE — 82042 OTHER SOURCE ALBUMIN QUAN EA: CPT

## 2024-11-24 PROCEDURE — 83735 ASSAY OF MAGNESIUM: CPT

## 2024-11-24 PROCEDURE — 83935 ASSAY OF URINE OSMOLALITY: CPT

## 2024-11-24 PROCEDURE — 82140 ASSAY OF AMMONIA: CPT

## 2024-11-24 PROCEDURE — 80048 BASIC METABOLIC PNL TOTAL CA: CPT

## 2024-11-24 PROCEDURE — 97162 PT EVAL MOD COMPLEX 30 MIN: CPT | Mod: GP

## 2024-11-24 PROCEDURE — 84157 ASSAY OF PROTEIN OTHER: CPT

## 2024-11-24 PROCEDURE — 87077 CULTURE AEROBIC IDENTIFY: CPT

## 2024-11-24 RX ORDER — PREDNISOLONE ACETATE 1.2 MG/ML
1 SUSPENSION/ DROPS OPHTHALMIC
Refills: 0 | Status: DISCONTINUED | OUTPATIENT
Start: 2024-11-24 | End: 2024-12-04

## 2024-11-24 RX ORDER — 0.9 % SODIUM CHLORIDE 0.9 %
1000 INTRAVENOUS SOLUTION INTRAVENOUS
Refills: 0 | Status: DISCONTINUED | OUTPATIENT
Start: 2024-11-24 | End: 2024-11-25

## 2024-11-24 RX ORDER — DORZOLAMIDE HYDROCHLORIDE 20 MG/ML
1 SOLUTION OPHTHALMIC
Refills: 0 | DISCHARGE

## 2024-11-24 RX ORDER — POLYETHYLENE GLYCOL 3350 17 G/17G
17 POWDER, FOR SOLUTION ORAL DAILY
Refills: 0 | Status: DISCONTINUED | OUTPATIENT
Start: 2024-11-24 | End: 2024-12-04

## 2024-11-24 RX ORDER — CIPROFLOXACIN HCL 750 MG
400 TABLET ORAL ONCE
Refills: 0 | Status: COMPLETED | OUTPATIENT
Start: 2024-11-24 | End: 2024-11-24

## 2024-11-24 RX ORDER — VANCOMYCIN HCL 900 MCG/MG
750 POWDER (GRAM) MISCELLANEOUS EVERY 12 HOURS
Refills: 0 | Status: DISCONTINUED | OUTPATIENT
Start: 2024-11-24 | End: 2024-11-25

## 2024-11-24 RX ORDER — PANTOPRAZOLE SODIUM 40 MG/1
1 TABLET, DELAYED RELEASE ORAL
Refills: 0 | DISCHARGE

## 2024-11-24 RX ORDER — GLUCAGON INJECTION, SOLUTION 0.5 MG/.1ML
1 INJECTION, SOLUTION SUBCUTANEOUS ONCE
Refills: 0 | Status: DISCONTINUED | OUTPATIENT
Start: 2024-11-24 | End: 2024-12-04

## 2024-11-24 RX ORDER — ERGOCALCIFEROL (VITAMIN D2) 200 MCG/ML
0 DROPS ORAL
Refills: 0 | DISCHARGE

## 2024-11-24 RX ORDER — MEGESTROL ACETATE 40 MG/ML
1 SUSPENSION ORAL
Refills: 0 | DISCHARGE

## 2024-11-24 RX ORDER — TIMOLOL 0.5 %
1 DROPS OPHTHALMIC (EYE)
Refills: 0 | DISCHARGE

## 2024-11-24 RX ORDER — CEFTRIAXONE SODIUM 1 G
1000 VIAL (EA) INJECTION EVERY 24 HOURS
Refills: 0 | Status: DISCONTINUED | OUTPATIENT
Start: 2024-11-24 | End: 2024-11-25

## 2024-11-24 RX ORDER — TIMOLOL 0.5 %
1 DROPS OPHTHALMIC (EYE)
Refills: 0 | Status: DISCONTINUED | OUTPATIENT
Start: 2024-11-24 | End: 2024-12-04

## 2024-11-24 RX ORDER — ACETAMINOPHEN, DIPHENHYDRAMINE HCL, PHENYLEPHRINE HCL 325; 25; 5 MG/1; MG/1; MG/1
3 TABLET ORAL AT BEDTIME
Refills: 0 | Status: DISCONTINUED | OUTPATIENT
Start: 2024-11-24 | End: 2024-12-04

## 2024-11-24 RX ORDER — PANTOPRAZOLE SODIUM 40 MG/1
40 TABLET, DELAYED RELEASE ORAL
Refills: 0 | Status: DISCONTINUED | OUTPATIENT
Start: 2024-11-24 | End: 2024-12-04

## 2024-11-24 RX ORDER — DORZOLAMIDE HYDROCHLORIDE 20 MG/ML
1 SOLUTION OPHTHALMIC EVERY 8 HOURS
Refills: 0 | Status: DISCONTINUED | OUTPATIENT
Start: 2024-11-24 | End: 2024-12-04

## 2024-11-24 RX ORDER — MEGESTROL ACETATE 40 MG/ML
20 SUSPENSION ORAL DAILY
Refills: 0 | Status: DISCONTINUED | OUTPATIENT
Start: 2024-11-24 | End: 2024-12-01

## 2024-11-24 RX ORDER — METRONIDAZOLE 500 MG/1
500 TABLET ORAL EVERY 12 HOURS
Refills: 0 | Status: DISCONTINUED | OUTPATIENT
Start: 2024-11-24 | End: 2024-11-26

## 2024-11-24 RX ORDER — PREDNISOLONE ACETATE 1.2 MG/ML
1 SUSPENSION/ DROPS OPHTHALMIC
Refills: 0 | DISCHARGE

## 2024-11-24 RX ORDER — HEPARIN SODIUM,PORCINE 1000/ML
5000 VIAL (ML) INJECTION EVERY 12 HOURS
Refills: 0 | Status: DISCONTINUED | OUTPATIENT
Start: 2024-11-24 | End: 2024-12-01

## 2024-11-24 RX ORDER — LACTULOSE 10 G/15ML
20 SOLUTION ORAL
Refills: 0 | Status: DISCONTINUED | OUTPATIENT
Start: 2024-11-24 | End: 2024-11-30

## 2024-11-24 RX ORDER — 0.9 % SODIUM CHLORIDE 0.9 %
1000 INTRAVENOUS SOLUTION INTRAVENOUS ONCE
Refills: 0 | Status: COMPLETED | OUTPATIENT
Start: 2024-11-24 | End: 2024-11-24

## 2024-11-24 RX ADMIN — Medication 200 MILLIGRAM(S): at 18:31

## 2024-11-24 RX ADMIN — Medication 1000 MILLILITER(S): at 16:20

## 2024-11-24 RX ADMIN — METRONIDAZOLE 100 MILLIGRAM(S): 500 TABLET ORAL at 20:04

## 2024-11-24 RX ADMIN — LACTULOSE 20 GRAM(S): 10 SOLUTION ORAL at 20:02

## 2024-11-24 NOTE — H&P ADULT - ASSESSMENT
Patient is a 68-year-old male with a past medical history of DM, cataracts, neuropathy, and metastatic cholangiocarcinoma (follows at [facility] [place]) presenting for altered mental status. AMS started on friday as per family    #AMS  #Metastatic cholangiocarcinoma s/p CBD and hepatic duct stenting s/p cholecystoduodenostomy   #Cirrhosis with ascites   #Hx of hepatic abscess   * Follows at [facility] in [place] since May 2022 for cholangiocarcinoma  * Follows with Dr Sanchez (Advanced GI) and Dr. Littlejohn (oncologist) at [facility] [place]  * Deemed not a surgical candidate because of extensive metastatic disease  - Vitals on arrival: /72, HR 97, T 97, RR 17, SpO2 98% on RA  - On admission WBC 8, lactate 3.9, t bili 1.1, alk phos 258, ammonia 71  - UA negative, RVP negative  - CXR (11.24.24 @ 13:25): Low lung volumes without focal consolidation or pneumothorax.  - CTAP w/ IV Cont (11.24.24 @ 14:43): Large abdominal ascites occupying the entire peritoneum. Questionable reactive changes of the small bowel due to ascites. No bowel obstruction. Drain from the level of the gallbladder to the duodenum with associated metal artifact obscuring evaluation. Biliary duct stents are also noted. Questionable scrotal wall edema. Correlate with physical exam. Mildly nodular liver may be seen with cirrhosis and associated varices as described.  - CT Head No Cont (11.24.24 @ 14:43): Allowing for motion artifact, unremarkable study.  - s/p Ciprofloxacin 400mg, Flagyl 500mg, LR 1L bolus in the ED    - Continue Ceftriaxone and Flagyl   - Follow up blood cultures (received)  - Follow up repeat lactate  - Lactulose 20g q6 to target 3-4BMs daily   - Advanced GI consult   - Monitor BMs    !! INCOMPLETE NOTE    #VIPUL  - Creatinine on admission 1.8  - Creatinine 1.2 in June      #DM   - On home  - ISS for now, target -180  - Monitor FS and adjust accordingly    #Neuropathy          #DVT ppx: Heparin  #GI ppx: Pantoprazole  #Diet: CC  #Activity: IAT  #Code:  #Dispo: Medicine  #Medrec    #Handoff Patient is a 68-year-old male with a past medical history of DM, cataracts, neuropathy, and metastatic cholangiocarcinoma (follows at [facility] [place]) presenting for altered mental status.     Called daughter Jazlyn López (physician, [phone]) who reports that patient has had short periods of confusion over the last few months, usually caused by infections (cholangitis, SBP...). This morning, patient woke up and was more lethargic than usual, with delayed responses when spoken to. He has been around sick contacts so they decided to bring him in to r/o infection as cause of lethargy. As per daughter, patient has a peritoneal drain, he drains himself 1L of peritoneal fluid daily. Last MR head to check for mets was done 2 months ago and was negative.     #Lethargy   #Metastatic cholangiocarcinoma s/p CBD and hepatic duct stenting s/p cholecystoduodenostomy s/p peritoneal drain (currently in place)  #Large abdominal ascites   #Hx of hepatic abscess   * Follows at [facility] in [place] since May 2022 for cholangiocarcinoma  * Follows with Dr Sanchez (Advanced GI) and Dr. Littlejohn (oncologist) at [facility] [place]  * Deemed not a surgical candidate because of extensive metastatic disease  * Pt has a peritoneal drain in place and drains himself 1L of peritoneal fluid daily   - Vitals on arrival: /72, HR 97, T 97, RR 17, SpO2 98% on RA  - On admission WBC 8, lactate 3.9, t bili 1.1, alk phos 258, ammonia 71  - UA negative, RVP negative  - CXR (11.24.24 @ 13:25): Low lung volumes without focal consolidation or pneumothorax.  - CTAP w/ IV Cont (11.24.24 @ 14:43): Large abdominal ascites occupying the entire peritoneum. Questionable reactive changes of the small bowel due to ascites. No bowel obstruction. Drain from the level of the gallbladder to the duodenum with associated metal artifact obscuring evaluation. Biliary duct stents are also noted. Questionable scrotal wall edema. Correlate with physical exam. Mildly nodular liver may be seen with cirrhosis and associated varices as described.  - CT Head No Cont (11.24.24 @ 14:43): Allowing for motion artifact, unremarkable study.  - s/p Ciprofloxacin 400mg, Flagyl 500mg, LR 1L bolus in the ED  - Continue Ceftriaxone, Flagyl and Vanco for now   - Follow up blood cultures (received)  - Follow up repeat lactate, procalcitonin   - Lactulose 20g q6 for 2 days  - Monitor BMs  - Advanced GI consult (for draining ascites from peritoneal drain placed by advanced GI at [facility])  - Palliative care consult   - If patient remains lethargic or develops confusion, consider MR Head to r/o metastatic disease     #VIPUL  - Creatinine on admission 1.8  - Creatinine 1.2 in June  - s/p 1L LR bolus in ED  - Monitor BMP    #DM   - On home  - ISS for now, target -180  - Monitor FS and adjust accordingly    #Neuropathy          #DVT ppx: Heparin  #GI ppx: Pantoprazole  #Diet: CC  #Activity: IAT  #Code: FULL code, confirmed with daughter Jazlyn Rene (physician, [phone])  #Dispo: Medicine  #Medrec: Confirmed with daughter     #Handoff  - Advanced GI consult for peritoneal drainage   - Palliative care consult   - Follow up blood cultures (received)  - Follow up repeat lactate, procalcitonin   - Monitor mental status Patient is a 68-year-old male with a past medical history of DM, cataracts, neuropathy, and metastatic cholangiocarcinoma (follows at [facility] [place]) presenting for altered mental status.     Called daughter Jazlyn López (physician, [phone]) who reports that patient has had short periods of confusion over the last few months, usually caused by infections (cholangitis, SBP...). This morning, patient woke up and was more lethargic than usual, with delayed responses when spoken to. He has been around sick contacts so they decided to bring him in to r/o infection as cause of lethargy. As per daughter, patient has a peritoneal drain, he drains himself 1L of peritoneal fluid daily. Last MR head to check for mets was done 2 months ago and was negative.     #Lethargy   #Metastatic cholangiocarcinoma s/p CBD and hepatic duct stenting s/p cholecystoduodenostomy s/p peritoneal drain (currently in place)  #Large abdominal ascites   #Hx of hepatic abscess   * Follows at [facility] in [place] since May 2022 for cholangiocarcinoma  * Follows with Dr Sanchez (Advanced GI) and Dr. Littlejohn (oncologist) at [facility] [place]  * Deemed not a surgical candidate because of extensive metastatic disease  * Pt has a peritoneal drain in place and drains himself 1L of peritoneal fluid daily   * Last MR Head around 2 months ago, no mets  - Vitals on arrival: /72, HR 97, T 97, RR 17, SpO2 98% on RA  - On admission WBC 8, lactate 3.9, t bili 1.1, alk phos 258, ammonia 71  - UA negative, RVP negative  - CXR (11.24.24 @ 13:25): Low lung volumes without focal consolidation or pneumothorax.  - CTAP w/ IV Cont (11.24.24 @ 14:43): Large abdominal ascites occupying the entire peritoneum. Questionable reactive changes of the small bowel due to ascites. No bowel obstruction. Drain from the level of the gallbladder to the duodenum with associated metal artifact obscuring evaluation. Biliary duct stents are also noted. Questionable scrotal wall edema. Correlate with physical exam. Mildly nodular liver may be seen with cirrhosis and associated varices as described.  - CT Head No Cont (11.24.24 @ 14:43): Allowing for motion artifact, unremarkable study.  - s/p Ciprofloxacin 400mg, Flagyl 500mg, LR 1L bolus in the ED  - Continue Ceftriaxone, Flagyl and Vanco for now   - Follow up blood cultures (received)  - Follow up repeat lactate, procalcitonin   - Lactulose 20g q6 for 2 days  - Monitor BMs  - Advanced GI consult (for draining ascites from peritoneal drain placed by advanced GI at [facility] [place])  - Palliative care consult   - If patient remains lethargic or develops confusion, consider MR Head to r/o metastatic disease     #VIPUL  - Creatinine on admission 1.8  - Creatinine 1.2 in June  - s/p 1L LR bolus in ED  - Monitor BMP    #DM   - On home Farxiga 10mg daily  - ISS for now, target -180  - Monitor FS and adjust accordingly    #Cataract  - Continue home eye drops        #DVT ppx: Heparin  #GI ppx: Pantoprazole  #Diet: CC  #Activity: IAT  #Code: FULL code, confirmed with daughter Jazlyn López (physician, [phone])  #Dispo: Medicine   #Medrec: Confirmed with daughter     #Handoff  - Advanced GI consult for peritoneal drainage   - Palliative care consult   - Follow up blood cultures (received)  - Follow up repeat lactate, procalcitonin   - Monitor mental status

## 2024-11-24 NOTE — ED PROVIDER NOTE - OBJECTIVE STATEMENT
68-year-old male PMH DM, bile duct cancer s/p Tenckhoff catheter that constrained daily, undergoing chemo treatment (currently taking chemo hiatus as it makes patient very weak - patient follows with oncologist at [facility]), who presents to the ED brought in for concerns of altered mental status and weakness.  As per patient's wife and daughter, patient has had intermittent episodes of confusion over the past few months, all requiring hospitalization - patient is typically ANO x 3 at baseline.  Over the past 2 days patient has had decreased p.o. intake.  Patient's wife notes that yesterday patient's Tenckhoff catheter did not produce any output, but today she drained 950 cc of fluid.  Denies fever, chills, CP, SOB, nausea, vomiting, abdominal pain, or urinary symptoms.

## 2024-11-24 NOTE — H&P ADULT - ATTENDING COMMENTS
Patient seen and examined at bedside independently of the residents. I read the resident's note and agree with the plan with the additions and corrections as noted below. My note supersedes the resident's note.     REVIEW OF SYSTEMS:  Negative except in HPI.     PMH: Stage 4 cholangiocarcinoma, DM II with neuropathy and cataracts    FHx: Reviewed. No fhx of asthma/copd, No fhx of liver and pulmonary disease. No fhx of hematological disorder.     Physical Exam:  GEN: No acute distress. Awake, Alert and oriented x 3.   Head: Atraumatic, Normocephalic.   Eye: PEERLA. No sclera icterus. EOMI.   ENT: Normal oropharynx, no thyromegaly, no mass, no lymphadenopathy.   LUNGS: Clear to auscultation bilaterally. No wheeze/rales/crackles.   HEART: Normal. S1/S2 present. RRR. No murmur/gallops.   ABD: + distended abdomen with tenderness to palpation of generalized abdomen. Bowel sounds present.   EXT: No pitting edema. No erythema. No tenderness.  Integumentary: No rash, No sore, No petechia.   NEURO: CN III-XII intact. Moving all extremities.     Vital Signs Last 24 Hrs  T(C): 36.4 (2024 20:56), Max: 36.5 (2024 16:07)  T(F): 97.6 (2024 20:56), Max: 97.7 (2024 16:07)  HR: 68 (2024 16:07) (68 - 97)  BP: 128/81 (2024 20:56) (116/77 - 128/81)  BP(mean): 97 (2024 20:56) (97 - 97)  RR: 18 (2024 20:56) (17 - 18)  SpO2: 100% (2024 20:56) (98% - 100%)    Parameters below as of 2024 20:56  Patient On (Oxygen Delivery Method): room air      Please see the above notes for Labs and radiology.     Assessment and Plan:       67 yo M with hx of Stage 4 cholangiocarcinoma, DM II with neuropathy and cataracts presents to ED for lethargy started last Friday.     Lethargy  likely 2/2 suspected metabolic encephalopathy   - Ddx: secondary peritonitis vs. hepatic encephalopathy vs. worsening malignancy   - CTH: Allowing for motion artifact, unremarkable study.  - CT abdo/pelvic: Large abdominal ascites occupying the entire peritoneum. Questionable reactive changes of the small bowel due to ascites. No bowel obstruction.   - CXR: Low lung volumes without focal consolidation or pneumothorax.  - UA unremarkable.   - serum NH4 71 --> will given lactulose (target BM 3 per day)  - c/w Vanc, Ceftriaxone and Flagyl for now.  - f/u BCx, procalcitonin, RVP   - supportive care.   - GI consult for peritoneal drainage.     Stage 4 cholangiocarcinoma with ascites   - CT abdomen shows Drain from the level of the gallbladder to the duodenum with associated metal artifact obscuring evaluation. Biliary duct stents are also noted. Questionable scrotal wall edema. Mildly nodular liver may be seen with cirrhosis and associated varices as described.  - Follow up outpatient.   - Palliative care consult.     DM II - monitor FS AC HS. Insulin regimen.   CKD stage 3 - serum Cr 1.7 which is around baseline.    DVT ppx: Heparin SC  GI ppx: PPI   Diet: CC diet  Activity: as tolerated.     Date seen by the attendin2024  I have spent 75 minutes of time on the encounter which excludes teaching and/or separately reported services. Patient seen and examined at bedside independently of the residents. I read the resident's note and agree with the plan with the additions and corrections as noted below. My note supersedes the resident's note.     REVIEW OF SYSTEMS:  Negative except in HPI.     PMH: Stage 4 cholangiocarcinoma, DM II with neuropathy and cataracts    FHx: Reviewed. No fhx of asthma/copd, No fhx of liver and pulmonary disease. No fhx of hematological disorder.     Physical Exam:  GEN: No acute distress. Awake but lethargic. Oriented x 3 currently.   Head: Atraumatic, Normocephalic.   Eye: PEERLA. No sclera icterus. EOMI.   ENT: Normal oropharynx, no thyromegaly, no mass, no lymphadenopathy.   LUNGS: Clear to auscultation bilaterally. No wheeze/rales/crackles.   HEART: Normal. S1/S2 present. RRR. No murmur/gallops.   ABD: + distended abdomen with tenderness to palpation of generalized abdomen. Bowel sounds present.   EXT: No pitting edema. No erythema. No tenderness.  Integumentary: No rash, No sore, No petechia.   NEURO: CN III-XII intact. Moving all extremities.     Vital Signs Last 24 Hrs  T(C): 36.4 (2024 20:56), Max: 36.5 (2024 16:07)  T(F): 97.6 (2024 20:56), Max: 97.7 (2024 16:07)  HR: 68 (2024 16:07) (68 - 97)  BP: 128/81 (2024 20:56) (116/77 - 128/81)  BP(mean): 97 (2024 20:56) (97 - 97)  RR: 18 (2024 20:56) (17 - 18)  SpO2: 100% (2024 20:56) (98% - 100%)    Parameters below as of 2024 20:56  Patient On (Oxygen Delivery Method): room air      Please see the above notes for Labs and radiology.     Assessment and Plan:       69 yo M with hx of Stage 4 cholangiocarcinoma, DM II with neuropathy and cataracts presents to ED for lethargy started last Friday.     Lethargy  likely 2/2 suspected metabolic encephalopathy   - Ddx: secondary peritonitis vs. hepatic encephalopathy vs. worsening malignancy   - CTH: Allowing for motion artifact, unremarkable study.  - CT abdo/pelvic: Large abdominal ascites occupying the entire peritoneum. Questionable reactive changes of the small bowel due to ascites. No bowel obstruction.   - CXR: Low lung volumes without focal consolidation or pneumothorax.  - UA unremarkable.   - serum NH4 71 --> will given lactulose (target BM 3 per day)  - c/w Vanc, Ceftriaxone and Flagyl for now.  - f/u BCx, procalcitonin, RVP   - supportive care.   - GI consult for peritoneal drainage.     Stage 4 cholangiocarcinoma with ascites   - CT abdomen shows Drain from the level of the gallbladder to the duodenum with associated metal artifact obscuring evaluation. Biliary duct stents are also noted. Questionable scrotal wall edema. Mildly nodular liver may be seen with cirrhosis and associated varices as described.  - Follow up outpatient.   - Palliative care consult.     DM II - monitor FS AC HS. Insulin regimen.   CKD stage 3 - serum Cr 1.7 which is around baseline.    DVT ppx: Heparin SC  GI ppx: PPI   Diet: CC diet  Activity: as tolerated.     Date seen by the attendin2024  I have spent 75 minutes of time on the encounter which excludes teaching and/or separately reported services. Patient seen and examined at bedside independently of the residents. I read the resident's note and agree with the plan with the additions and corrections as noted below. My note supersedes the resident's note.     REVIEW OF SYSTEMS:  Negative except in HPI.     PMH: Stage 4 cholangiocarcinoma, DM II with neuropathy and cataracts    FHx: Reviewed. No fhx of asthma/copd, No fhx of liver and pulmonary disease. No fhx of hematological disorder.     Physical Exam:  GEN: No acute distress. Awake but lethargic. Oriented x 3 currently.   Head: Atraumatic, Normocephalic.   Eye: PEERLA. No sclera icterus. EOMI.   ENT: Normal oropharynx, no thyromegaly, no mass, no lymphadenopathy.   LUNGS: Clear to auscultation bilaterally. No wheeze/rales/crackles.   HEART: Normal. S1/S2 present. RRR. No murmur/gallops.   ABD: + distended abdomen with tenderness to palpation of generalized abdomen. Bowel sounds present.   EXT: No pitting edema. No erythema. No tenderness.  Integumentary: No rash, No sore, No petechia.   NEURO: CN III-XII intact. Moving all extremities.     Vital Signs Last 24 Hrs  T(C): 36.4 (2024 20:56), Max: 36.5 (2024 16:07)  T(F): 97.6 (2024 20:56), Max: 97.7 (2024 16:07)  HR: 68 (2024 16:07) (68 - 97)  BP: 128/81 (2024 20:56) (116/77 - 128/81)  BP(mean): 97 (2024 20:56) (97 - 97)  RR: 18 (2024 20:56) (17 - 18)  SpO2: 100% (2024 20:56) (98% - 100%)    Parameters below as of 2024 20:56  Patient On (Oxygen Delivery Method): room air      Please see the above notes for Labs and radiology.     Assessment and Plan:       67 yo M with hx of Stage 4 cholangiocarcinoma, DM II with neuropathy and cataracts presents to ED for lethargy started last Friday.     Lethargy  likely 2/2 suspected metabolic encephalopathy   - Ddx: secondary peritonitis vs. hepatic encephalopathy vs. worsening malignancy   - CTH: Allowing for motion artifact, unremarkable study.  - CT abdo/pelvic: Large abdominal ascites occupying the entire peritoneum. Questionable reactive changes of the small bowel due to ascites. No bowel obstruction.   - CXR: Low lung volumes without focal consolidation or pneumothorax.  - UA unremarkable.   - serum NH4 71 --> will given lactulose (target BM 3 per day)  - c/w Vanc, Ceftriaxone and Flagyl for now.  - f/u BCx, procalcitonin, RVP   - supportive care.   - GI consult for peritoneal drainage.     Stage 4 cholangiocarcinoma with ascites   Cirrhosis   - CT abdomen shows Drain from the level of the gallbladder to the duodenum with associated metal artifact obscuring evaluation. Biliary duct stents are also noted. Questionable scrotal wall edema. Mildly nodular liver may be seen with cirrhosis and associated varices as described.  - Follow up outpatient.   - Palliative care consult.     DM II - monitor FS AC HS. Insulin regimen.   CKD stage 3 - serum Cr 1.7 which is around baseline.    DVT ppx: Heparin SC  GI ppx: PPI   Diet: CC diet  Activity: as tolerated.     Date seen by the attendin2024  I have spent 75 minutes of time on the encounter which excludes teaching and/or separately reported services.

## 2024-11-24 NOTE — ED PROVIDER NOTE - PHYSICAL EXAMINATION
VITAL SIGNS: I have reviewed nursing notes and confirm.  CONSTITUTIONAL: in NAD.   SKIN: Skin exam is warm and dry.  HEAD: Normocephalic; atraumatic.  EYES: PERRL, EOM intact; + BL scleral icterus.   ENT: No nasal discharge; airway clear.  NECK: Supple; non tender.  CARD: S1, S2; Regular rate and rhythm.  RESP: No wheezes, rales or rhonchi. Speaking in full sentences.   ABD: Normal bowel sounds; soft; distended, +ascites, non-tender; No rebound or guarding. No CVA tenderness.  EXT: Normal ROM.   NEURO: Alert, oriented x1. Grossly unremarkable. No focal deficits.

## 2024-11-24 NOTE — H&P ADULT - NSHPPHYSICALEXAM_GEN_ALL_CORE
T(C): 36.5 (11-24-24 @ 16:07), Max: 36.5 (11-24-24 @ 16:07)  HR: 68 (11-24-24 @ 16:07) (68 - 97)  BP: 116/77 (11-24-24 @ 16:07) (116/77 - 118/72)  RR: 18 (11-24-24 @ 16:07) (17 - 18)  SpO2: 98% (11-24-24 @ 16:07) (98% - 98%)    CONSTITUTIONAL: Well groomed  RESP: No respiratory distress, no use of accessory muscles; CTA b/l  CV: RRR, +S1S2, no MRG; no JVD; no peripheral edema  GI:   SKIN:   NEURO: T(C): 36.5 (11-24-24 @ 16:07), Max: 36.5 (11-24-24 @ 16:07)  HR: 68 (11-24-24 @ 16:07) (68 - 97)  BP: 116/77 (11-24-24 @ 16:07) (116/77 - 118/72)  RR: 18 (11-24-24 @ 16:07) (17 - 18)  SpO2: 98% (11-24-24 @ 16:07) (98% - 98%)    CONSTITUTIONAL: Well groomed  RESP: No respiratory distress, no use of accessory muscles; CTA b/l  CV: RRR, +S1S2, no MRG; no JVD; no peripheral edema  GI: Distended, soft, non tender  SKIN: Mild bilateral feet edema  NEURO:

## 2024-11-24 NOTE — ED ADULT TRIAGE NOTE - CHIEF COMPLAINT QUOTE
BIBA from home for worsening altered mental status since friday  pt with h/o biliary duct cancer, pt with abdominal distention & edema b/l lower extremities that also started friday

## 2024-11-24 NOTE — H&P ADULT - NSHPLABSRESULTS_GEN_ALL_CORE
LABS:                        8.0    8.13  )-----------( 220      ( 24 Nov 2024 15:42 )             24.2     11-24    132[L]  |  104  |  34[H]  ----------------------------<  139[H]  4.5   |  18  |  1.8[H]    Ca    7.8[L]      24 Nov 2024 15:42    TPro  7.7  /  Alb  2.1[L]  /  TBili  1.1  /  DBili  x   /  AST  37  /  ALT  16  /  AlkPhos  258[H]  11-24    PT/INR - ( 24 Nov 2024 15:42 )   PT: 13.60 sec;   INR: 1.15 ratio         PTT - ( 24 Nov 2024 15:42 )  PTT:34.6 sec    Urinalysis with Rflx Culture (collected 24 Nov 2024 14:37)

## 2024-11-24 NOTE — ED PROVIDER NOTE - CLINICAL SUMMARY MEDICAL DECISION MAKING FREE TEXT BOX
64-year-old history of biliary duct cancer, here for confusion and abdominal pain.  Belly distended with lower leg extremity distention.  Labs CT done.  Workup positive for diffuse cirrhosis.  No history of cirrhosis.  No fevers no white count.  Low concern for SBP.      ED work up reviewed and results and plan of care discussed with patient. Patient requires admission for further work up, monitoring, and management. Need for admission discussed with patient.

## 2024-11-24 NOTE — H&P ADULT - HISTORY OF PRESENT ILLNESS
Patient is a 68-year-old male with a past medical history of DM, cataracts, neuropathy, and metastatic cholangiocarcinoma (follows at [facility] [place]) presenting for altered mental status. AMS started on friday as per family    Vitals: /72, HR 97, T 97, RR 17, SpO2 98% on RA  Labs: WBC 8, Hgb 8, Na 132, Crea 1.8, alk phos 258, lactate 3.9, ammonia 71,   UA negative  RVP negative    - CXR (11.24.24 @ 13:25): Low lung volumes without focal consolidation or pneumothorax.  - CTAP w/ IV Cont (11.24.24 @ 14:43): Large abdominal ascites occupying the entire peritoneum. Questionable reactive changes of the small bowel due to ascites. No bowel obstruction. Drain from the level of the gallbladder to the duodenum with associated metal artifact obscuring evaluation. Biliary duct stents are also noted. Questionable scrotal wall edema. Correlate with physical exam. Mildly nodular liver may be seen with cirrhosis and associated varices as described.  - CT Head No Cont (11.24.24 @ 14:43): Allowing for motion artifact, unremarkable study.    In the ED, patient received Ciprofloxacin 400mg, Flagyl 500mg, LR 1L bolus and was started on lactulose.    Patient is a 68-year-old male with a past medical history of DM, cataracts, neuropathy, and metastatic cholangiocarcinoma (follows at [facility] [place]) presenting for altered mental status. On my exam, patient is AAOx3, he has some abdominal discomfort with mild pain around the drain he has in RUQ.    Called daughter Jazlyn López (physician, [phone]) who reports that patient has had short periods of confusion over the last few months, usually caused by infections (cholangitis, SBP...). This morning, patient woke up and was more lethargic than usual, with delayed responses when spoken to. He has been around sick contacts so they decided to bring him in to r/o infection as cause of lethargy. As per daughter, patient has a peritoneal drain, he drains himself 1L of peritoneal fluid daily. Last MR head to check for mets was done 2 months ago and was negative.   No fever/chills, no vomiting, no urinary sx or diarrhea at home.     Vitals: /72, HR 97, T 97, RR 17, SpO2 98% on RA  Labs: WBC 8, Hgb 8, Na 132, Crea 1.8, alk phos 258, lactate 3.9, ammonia 71,   UA negative  RVP negative    - CXR (11.24.24 @ 13:25): Low lung volumes without focal consolidation or pneumothorax.  - CTAP w/ IV Cont (11.24.24 @ 14:43): Large abdominal ascites occupying the entire peritoneum. Questionable reactive changes of the small bowel due to ascites. No bowel obstruction. Drain from the level of the gallbladder to the duodenum with associated metal artifact obscuring evaluation. Biliary duct stents are also noted. Questionable scrotal wall edema. Correlate with physical exam. Mildly nodular liver may be seen with cirrhosis and associated varices as described.  - CT Head No Cont (11.24.24 @ 14:43): Allowing for motion artifact, unremarkable study.    In the ED, patient received Ciprofloxacin 400mg, Flagyl 500mg, LR 1L bolus and was started on lactulose.    Patient is a 68-year-old male with a past medical history of DM, cataracts, neuropathy, and metastatic cholangiocarcinoma ([place] [facility] [place] presenting for altered mental status. On my exam, patient is AAOx3, he has some abdominal discomfort with mild pain around the drain he has in RUQ.    Called daughter Jazlyn López (physician, [phone]) who reports that patient has had short periods of confusion over the last few months, usually caused by infections (cholangitis, SBP...). This morning, patient woke up and was more lethargic than usual, with delayed responses when spoken to. He has been around sick contacts so they decided to bring him in to r/o infection as cause of lethargy. As per daughter, patient has a peritoneal drain, he drains himself 1L of peritoneal fluid daily. Last MR head to check for mets was done 2 months ago and was negative.   No fever/chills, no vomiting, no urinary sx or diarrhea at home.     Vitals: /72, HR 97, T 97, RR 17, SpO2 98% on RA  Labs: WBC 8, Hgb 8, Na 132, Crea 1.8, alk phos 258, lactate 3.9, ammonia 71,   UA negative  RVP negative  Imaging:  - CXR (11.24.24 @ 13:25): Low lung volumes without focal consolidation or pneumothorax.  - CTAP w/ IV Cont (11.24.24 @ 14:43): Large abdominal ascites occupying the entire peritoneum. Questionable reactive changes of the small bowel due to ascites. No bowel obstruction. Drain from the level of the gallbladder to the duodenum with associated metal artifact obscuring evaluation. Biliary duct stents are also noted. Questionable scrotal wall edema. Correlate with physical exam. Mildly nodular liver may be seen with cirrhosis and associated varices as described.  - CT Head No Cont (11.24.24 @ 14:43): Allowing for motion artifact, unremarkable study.    In the ED, patient received Ciprofloxacin 400mg, Flagyl 500mg, LR 1L bolus and was started on lactulose.

## 2024-11-25 DIAGNOSIS — Z51.5 ENCOUNTER FOR PALLIATIVE CARE: ICD-10-CM

## 2024-11-25 DIAGNOSIS — N17.9 ACUTE KIDNEY FAILURE, UNSPECIFIED: ICD-10-CM

## 2024-11-25 DIAGNOSIS — C22.1 INTRAHEPATIC BILE DUCT CARCINOMA: ICD-10-CM

## 2024-11-25 LAB
A1C WITH ESTIMATED AVERAGE GLUCOSE RESULT: 5.3 % — SIGNIFICANT CHANGE UP (ref 4–5.6)
ALBUMIN SERPL ELPH-MCNC: 2 G/DL — LOW (ref 3.5–5.2)
ALP SERPL-CCNC: 250 U/L — HIGH (ref 30–115)
ALT FLD-CCNC: 15 U/L — SIGNIFICANT CHANGE UP (ref 0–41)
AMMONIA BLD-MCNC: 47 UMOL/L — SIGNIFICANT CHANGE UP (ref 11–55)
ANION GAP SERPL CALC-SCNC: 9 MMOL/L — SIGNIFICANT CHANGE UP (ref 7–14)
APPEARANCE UR: CLEAR — SIGNIFICANT CHANGE UP
AST SERPL-CCNC: 33 U/L — SIGNIFICANT CHANGE UP (ref 0–41)
B PERT IGG+IGM PNL SER: CLEAR — SIGNIFICANT CHANGE UP
BACTERIA # UR AUTO: NEGATIVE /HPF — SIGNIFICANT CHANGE UP
BASOPHILS # BLD AUTO: 0.08 K/UL — SIGNIFICANT CHANGE UP (ref 0–0.2)
BASOPHILS NFR BLD AUTO: 1 % — SIGNIFICANT CHANGE UP (ref 0–1)
BILIRUB SERPL-MCNC: 0.7 MG/DL — SIGNIFICANT CHANGE UP (ref 0.2–1.2)
BILIRUB UR-MCNC: NEGATIVE — SIGNIFICANT CHANGE UP
BUN SERPL-MCNC: 31 MG/DL — HIGH (ref 10–20)
CALCIUM SERPL-MCNC: 7.8 MG/DL — LOW (ref 8.4–10.5)
CAST: 1 /LPF — SIGNIFICANT CHANGE UP (ref 0–4)
CHLORIDE SERPL-SCNC: 108 MMOL/L — SIGNIFICANT CHANGE UP (ref 98–110)
CO2 SERPL-SCNC: 20 MMOL/L — SIGNIFICANT CHANGE UP (ref 17–32)
COLOR FLD: YELLOW — SIGNIFICANT CHANGE UP
COLOR SPEC: YELLOW — SIGNIFICANT CHANGE UP
CREAT ?TM UR-MCNC: 115 MG/DL — SIGNIFICANT CHANGE UP
CREAT SERPL-MCNC: 1.7 MG/DL — HIGH (ref 0.7–1.5)
DIFF PNL FLD: NEGATIVE — SIGNIFICANT CHANGE UP
E COLI DNA BLD POS QL NAA+NON-PROBE: SIGNIFICANT CHANGE UP
E FAECIUM DNA BLD POS QL NAA+NON-PROBE: SIGNIFICANT CHANGE UP
EGFR: 43 ML/MIN/1.73M2 — LOW
EOSINOPHIL # BLD AUTO: 0.47 K/UL — SIGNIFICANT CHANGE UP (ref 0–0.7)
EOSINOPHIL NFR BLD AUTO: 6.1 % — SIGNIFICANT CHANGE UP (ref 0–8)
ESTIMATED AVERAGE GLUCOSE: 105 MG/DL — SIGNIFICANT CHANGE UP (ref 68–114)
FLUID INTAKE SUBSTANCE CLASS: SIGNIFICANT CHANGE UP
GLUCOSE BLDC GLUCOMTR-MCNC: 138 MG/DL — HIGH (ref 70–99)
GLUCOSE BLDC GLUCOMTR-MCNC: 140 MG/DL — HIGH (ref 70–99)
GLUCOSE BLDC GLUCOMTR-MCNC: 163 MG/DL — HIGH (ref 70–99)
GLUCOSE SERPL-MCNC: 152 MG/DL — HIGH (ref 70–99)
GLUCOSE UR QL: >=1000 MG/DL
GRAM STN FLD: ABNORMAL
HCT VFR BLD CALC: 25.1 % — LOW (ref 42–52)
HGB BLD-MCNC: 8.3 G/DL — LOW (ref 14–18)
IMM GRANULOCYTES NFR BLD AUTO: 0.5 % — HIGH (ref 0.1–0.3)
KETONES UR-MCNC: NEGATIVE MG/DL — SIGNIFICANT CHANGE UP
LACTATE SERPL-SCNC: 3.4 MMOL/L — HIGH (ref 0.7–2)
LEUKOCYTE ESTERASE UR-ACNC: NEGATIVE — SIGNIFICANT CHANGE UP
LYMPHOCYTES # BLD AUTO: 1.43 K/UL — SIGNIFICANT CHANGE UP (ref 1.2–3.4)
LYMPHOCYTES # BLD AUTO: 18.7 % — LOW (ref 20.5–51.1)
LYMPHOCYTES # FLD: 27 — SIGNIFICANT CHANGE UP
MAGNESIUM SERPL-MCNC: 2.1 MG/DL — SIGNIFICANT CHANGE UP (ref 1.8–2.4)
MCHC RBC-ENTMCNC: 28.1 PG — SIGNIFICANT CHANGE UP (ref 27–31)
MCHC RBC-ENTMCNC: 33.1 G/DL — SIGNIFICANT CHANGE UP (ref 32–37)
MCV RBC AUTO: 85.1 FL — SIGNIFICANT CHANGE UP (ref 80–94)
MESOTHL CELL # FLD: 1 % — SIGNIFICANT CHANGE UP
METHOD TYPE: SIGNIFICANT CHANGE UP
METHOD TYPE: SIGNIFICANT CHANGE UP
MONOCYTES # BLD AUTO: 0.72 K/UL — HIGH (ref 0.1–0.6)
MONOCYTES NFR BLD AUTO: 9.4 % — HIGH (ref 1.7–9.3)
MONOS+MACROS # FLD: 69 % — SIGNIFICANT CHANGE UP
NEUTROPHILS # BLD AUTO: 4.92 K/UL — SIGNIFICANT CHANGE UP (ref 1.4–6.5)
NEUTROPHILS NFR BLD AUTO: 64.3 % — SIGNIFICANT CHANGE UP (ref 42.2–75.2)
NEUTROPHILS-BODY FLUID: 3 % — SIGNIFICANT CHANGE UP
NITRITE UR-MCNC: NEGATIVE — SIGNIFICANT CHANGE UP
NRBC # BLD: 0 /100 WBCS — SIGNIFICANT CHANGE UP (ref 0–0)
OSMOLALITY UR: 619 MOS/KG — SIGNIFICANT CHANGE UP (ref 50–1200)
PH UR: 6 — SIGNIFICANT CHANGE UP (ref 5–8)
PLATELET # BLD AUTO: 241 K/UL — SIGNIFICANT CHANGE UP (ref 130–400)
PMV BLD: 11.7 FL — HIGH (ref 7.4–10.4)
POTASSIUM SERPL-MCNC: 4.5 MMOL/L — SIGNIFICANT CHANGE UP (ref 3.5–5)
POTASSIUM SERPL-SCNC: 4.5 MMOL/L — SIGNIFICANT CHANGE UP (ref 3.5–5)
POTASSIUM UR-SCNC: 55 MMOL/L — SIGNIFICANT CHANGE UP
PROCALCITONIN SERPL-MCNC: 0.96 NG/ML — HIGH (ref 0.02–0.1)
PROT ?TM UR-MCNC: 34 MG/DLG/24H — SIGNIFICANT CHANGE UP
PROT SERPL-MCNC: 7.2 G/DL — SIGNIFICANT CHANGE UP (ref 6–8)
PROT UR-MCNC: 30 MG/DL
PROT/CREAT UR-RTO: 0.3 RATIO — HIGH (ref 0–0.2)
RBC # BLD: 2.95 M/UL — LOW (ref 4.7–6.1)
RBC # FLD: 15.4 % — HIGH (ref 11.5–14.5)
RBC CASTS # UR COMP ASSIST: 3 /HPF — SIGNIFICANT CHANGE UP (ref 0–4)
RCV VOL RI: 0 /UL — SIGNIFICANT CHANGE UP (ref 0–0)
SODIUM SERPL-SCNC: 137 MMOL/L — SIGNIFICANT CHANGE UP (ref 135–146)
SODIUM UR-SCNC: 20 MMOL/L — SIGNIFICANT CHANGE UP
SP GR SPEC: >1.03 — HIGH (ref 1–1.03)
SPECIMEN SOURCE: SIGNIFICANT CHANGE UP
SPECIMEN SOURCE: SIGNIFICANT CHANGE UP
SQUAMOUS # UR AUTO: 0 /HPF — SIGNIFICANT CHANGE UP (ref 0–5)
TOTAL NUCLEATED CELL COUNT, BODY FLUID: 62 /UL — SIGNIFICANT CHANGE UP
TUBE TYPE: SIGNIFICANT CHANGE UP
UROBILINOGEN FLD QL: 0.2 MG/DL — SIGNIFICANT CHANGE UP (ref 0.2–1)
UUN UR-MCNC: 774 MG/DL — SIGNIFICANT CHANGE UP
WBC # BLD: 7.66 K/UL — SIGNIFICANT CHANGE UP (ref 4.8–10.8)
WBC # FLD AUTO: 7.66 K/UL — SIGNIFICANT CHANGE UP (ref 4.8–10.8)
WBC UR QL: 8 /HPF — HIGH (ref 0–5)

## 2024-11-25 PROCEDURE — 99223 1ST HOSP IP/OBS HIGH 75: CPT

## 2024-11-25 PROCEDURE — 99232 SBSQ HOSP IP/OBS MODERATE 35: CPT

## 2024-11-25 PROCEDURE — 99222 1ST HOSP IP/OBS MODERATE 55: CPT

## 2024-11-25 RX ORDER — INFLUENZA VIRUS VACCINE 15; 15; 15; 15 UG/.5ML; UG/.5ML; UG/.5ML; UG/.5ML
0.5 SUSPENSION INTRAMUSCULAR ONCE
Refills: 0 | Status: DISCONTINUED | OUTPATIENT
Start: 2024-11-25 | End: 2024-12-04

## 2024-11-25 RX ORDER — DAPTOMYCIN 500 MG/10ML
500 INJECTION, POWDER, LYOPHILIZED, FOR SOLUTION INTRAVENOUS
Refills: 0 | Status: COMPLETED | OUTPATIENT
Start: 2024-11-25 | End: 2024-12-01

## 2024-11-25 RX ORDER — CEFTRIAXONE SODIUM 1 G
2000 VIAL (EA) INJECTION EVERY 24 HOURS
Refills: 0 | Status: DISCONTINUED | OUTPATIENT
Start: 2024-11-25 | End: 2024-12-03

## 2024-11-25 RX ORDER — VANCOMYCIN HCL 900 MCG/MG
750 POWDER (GRAM) MISCELLANEOUS EVERY 24 HOURS
Refills: 0 | Status: DISCONTINUED | OUTPATIENT
Start: 2024-11-25 | End: 2024-11-25

## 2024-11-25 RX ADMIN — LACTULOSE 20 GRAM(S): 10 SOLUTION ORAL at 06:56

## 2024-11-25 RX ADMIN — POLYETHYLENE GLYCOL 3350 17 GRAM(S): 17 POWDER, FOR SOLUTION ORAL at 11:23

## 2024-11-25 RX ADMIN — Medication 5000 UNIT(S): at 17:29

## 2024-11-25 RX ADMIN — Medication 5000 UNIT(S): at 06:56

## 2024-11-25 RX ADMIN — LACTULOSE 20 GRAM(S): 10 SOLUTION ORAL at 11:23

## 2024-11-25 RX ADMIN — METRONIDAZOLE 100 MILLIGRAM(S): 500 TABLET ORAL at 17:29

## 2024-11-25 RX ADMIN — DORZOLAMIDE HYDROCHLORIDE 1 DROP(S): 20 SOLUTION OPHTHALMIC at 13:49

## 2024-11-25 RX ADMIN — Medication 1 DROP(S): at 17:29

## 2024-11-25 RX ADMIN — Medication 100 MILLIGRAM(S): at 14:32

## 2024-11-25 RX ADMIN — LACTULOSE 20 GRAM(S): 10 SOLUTION ORAL at 17:29

## 2024-11-25 RX ADMIN — MEGESTROL ACETATE 20 MILLIGRAM(S): 40 SUSPENSION ORAL at 14:22

## 2024-11-25 RX ADMIN — ACETAMINOPHEN, DIPHENHYDRAMINE HCL, PHENYLEPHRINE HCL 3 MILLIGRAM(S): 325; 25; 5 TABLET ORAL at 21:44

## 2024-11-25 RX ADMIN — DAPTOMYCIN 120 MILLIGRAM(S): 500 INJECTION, POWDER, LYOPHILIZED, FOR SOLUTION INTRAVENOUS at 21:38

## 2024-11-25 RX ADMIN — METRONIDAZOLE 100 MILLIGRAM(S): 500 TABLET ORAL at 06:56

## 2024-11-25 RX ADMIN — DORZOLAMIDE HYDROCHLORIDE 1 DROP(S): 20 SOLUTION OPHTHALMIC at 21:44

## 2024-11-25 RX ADMIN — Medication 1: at 12:30

## 2024-11-25 RX ADMIN — PANTOPRAZOLE SODIUM 40 MILLIGRAM(S): 40 TABLET, DELAYED RELEASE ORAL at 06:56

## 2024-11-25 RX ADMIN — PREDNISOLONE ACETATE 1 DROP(S): 1.2 SUSPENSION/ DROPS OPHTHALMIC at 17:29

## 2024-11-25 NOTE — CHART NOTE - NSCHARTNOTEFT_GEN_A_CORE
Spoke to daughter (physician in [place]) who confirmed patient does NOT have allergy to cefepime and has tolerated the antibiotic in the past. Allergy was dc'ed from the chart.

## 2024-11-25 NOTE — PHARMACOTHERAPY INTERVENTION NOTE - COMMENTS
Patient is currently on vancomycin 750mg IV q24h which predicts a steady state AUC/BRONWYN of 502.73hr*mg/L (goal:400-600) per [employer] vancomycin dosing software. Recommend continuing vancomycin 750mg IV q24h and obtaining a level 11/27 AM before the 3rd dose. Patient is currently on vancomycin 750mg IV q24h which predicts a steady state AUC/BRONWYN of 502.73hr*mg/L (goal:400-600) per [employer] vancomycin dosing software. Recommend continuing vancomycin 750mg IV q24h and obtaining a level 11/27 AM before the 3rd dose (does not need to be a true trough as [employer] will adjust for levels collected early).

## 2024-11-25 NOTE — CONSULT NOTE ADULT - SUBJECTIVE AND OBJECTIVE BOX
HPI:  Patient is a 68-year-old male with a past medical history of DM, cataracts, neuropathy, and metastatic cholangiocarcinoma [place] [facility] [place] for altered mental status. On my exam, patient is AAOx3, he has some abdominal discomfort with mild pain around the drain he has in RUQ.    Called daughter Jazlyn López (physician, [phone]) who reports that patient has had short periods of confusion over the last few months, usually caused by infections (cholangitis, SBP...). This morning, patient woke up and was more lethargic than usual, with delayed responses when spoken to. He has been around sick contacts so they decided to bring him in to r/o infection as cause of lethargy. As per daughter, patient has a peritoneal drain, he drains himself 1L of peritoneal fluid daily. Last MR head to check for mets was done 2 months ago and was negative.   No fever/chills, no vomiting, no urinary sx or diarrhea at home.     Vitals: /72, HR 97, T 97, RR 17, SpO2 98% on RA  Labs: WBC 8, Hgb 8, Na 132, Crea 1.8, alk phos 258, lactate 3.9, ammonia 71,   UA negative  RVP negative  Imaging:  - CXR (11.24.24 @ 13:25): Low lung volumes without focal consolidation or pneumothorax.  - CTAP w/ IV Cont (11.24.24 @ 14:43): Large abdominal ascites occupying the entire peritoneum. Questionable reactive changes of the small bowel due to ascites. No bowel obstruction. Drain from the level of the gallbladder to the duodenum with associated metal artifact obscuring evaluation. Biliary duct stents are also noted. Questionable scrotal wall edema. Correlate with physical exam. Mildly nodular liver may be seen with cirrhosis and associated varices as described.  - CT Head No Cont (11.24.24 @ 14:43): Allowing for motion artifact, unremarkable study.    In the ED, patient received Ciprofloxacin 400mg, Flagyl 500mg, LR 1L bolus and was started on lactulose.    (24 Nov 2024 20:15)    Patient does not fully answer questions and frequently stops his answers abruptly. No family at bedside.     ITEMS NOT CHECKED ARE NOT PRESENT    SOCIAL HISTORY:   Significant other/partner[x ]  Children[x ]  Congregation/Spirituality:  Substance hx:  [ ]   Tobacco hx:  [ ]   Alcohol hx: [ ]   Living Situation: [ ]Home  [ ]Long term care  [ ]Rehab [ ]Other  Home Services: [ ] HHA [ ] Visting RN [ ] Hospice  Occupation:  Home Opioid hx:  [ ] Y [ ] N [ ] I-Stop Reference No:     ADVANCE DIRECTIVES:    [ ] Full Code [ ] DNR  MOLST  [ ]  Living Will  [ ]   DECISION MAKER(s):  [ ] Health Care Proxy(s)  [ ] Surrogate(s)  [ ] Guardian           Name(s): Phone Number(s):    BASELINE (I)ADL(s) (prior to admission):  [place]: [ ]Total  [ ] Moderate [ ]Dependent  Palliative Performance Status Version 2:         %    http://npcrc.org/files/news/palliative_performance_scale_ppsv2.pdf    Allergies      Intolerances    MEDICATIONS  (STANDING):  cefTRIAXone   IVPB 2000 milliGRAM(s) IV Intermittent every 24 hours  dextrose 50% Injectable 25 Gram(s) IV Push once  dextrose 50% Injectable 12.5 Gram(s) IV Push once  dextrose 50% Injectable 25 Gram(s) IV Push once  dorzolamide 2% Ophthalmic Solution 1 Drop(s) Right EYE every 8 hours  glucagon  Injectable 1 milliGRAM(s) IntraMuscular once  heparin   Injectable 5000 Unit(s) SubCutaneous every 12 hours  influenza  Vaccine (HIGH DOSE) 0.5 milliLiter(s) IntraMuscular once  insulin lispro (ADMELOG) corrective regimen sliding scale   SubCutaneous three times a day before meals  lactulose Syrup 20 Gram(s) Oral four times a day  megestrol 20 milliGRAM(s) Oral daily  metroNIDAZOLE  IVPB 500 milliGRAM(s) IV Intermittent every 12 hours  pantoprazole    Tablet 40 milliGRAM(s) Oral before breakfast  polyethylene glycol 3350 17 Gram(s) Oral daily  prednisoLONE acetate 1% Suspension 1 Drop(s) Right EYE two times a day  timolol 0.25% Solution 1 Drop(s) Right EYE two times a day  vancomycin  IVPB 750 milliGRAM(s) IV Intermittent every 24 hours    MEDICATIONS  (PRN):  dextrose Oral Gel 15 Gram(s) Oral once PRN Blood Glucose LESS THAN 70 milliGRAM(s)/deciliter  melatonin 3 milliGRAM(s) Oral at bedtime PRN Insomnia      PRESENT SYMPTOMS: [x ]Unable to obtain due to poor mentation   Source if other than patient:  [ ]Family   [ ]Team     Pain: [ ]yes [ ]no  QOL impact -   Location -                    Aggravating factors -  Alleviating factors -   Quality -  Radiation -  Timing -   Severity (0-10 scale):  Minimal acceptable level (0-10 scale):     CPOT:  1  https://www.[facility].org/getattachment/uvg05k17-6f9a-5r1l-[email or web address]-9263n3847l5r/Critical-Care-Pain-Observation-Tool-(CPOT)    PAIN AD Score:   http://geriatrictoolkit.[facility]/cog/painad.pdf     Dyspnea:                           [ ]None[ ]Mild [ ]Moderate [ ]Severe     Respiratory Distress Observation Scale (RDOS): 2  A score of 0 to 2 signifies little or no respiratory distress, 3 signifies mild distress, scores 4 to 6 indicate moderate distress, and scores greater than 7 signify severe distress  https://www.[facility].ca/sites/default/files/PDFS/275685-bohyloijoew-dokfybft-neayixeceqd-iynwt.pdf    Anxiety:                             [ ]None[ ]Mild [ ]Moderate [ ]Severe   Fatigue:                             [ ]None[ ]Mild [ ]Moderate [ ]Severe   Nausea:                             [ ]None[ ]Mild [ ]Moderate [ ]Severe   Loss of appetite:              [ ]None[ ]Mild [ ]Moderate [ ]Severe   Constipation:                    [ ]None[ ]Mild [ ]Moderate [ ]Severe    Other Symptoms:  [ ]All other review of systems negative     Palliative Performance Status Version 2:         %    http://[facility].org/files/news/palliative_performance_scale_ppsv2.pdf  PHYSICAL EXAM:    GENERAL:  NAD   PULMONARY:  Non labored breathing  NEUROLOGIC: Lying in bed  BEHAVIORAL/PSYCH:  Calm    LABS: I have reviewed daily labs                          8.3    7.66  )-----------( 241      ( 25 Nov 2024 06:15 )             25.1       11-25    137  |  108  |  31[H]  ----------------------------<  152[H]  4.5   |  20  |  1.7[H]    Ca    7.8[L]      25 Nov 2024 06:15  Mg     2.1     11-25    TPro  7.2  /  Alb  2.0[L]  /  TBili  0.7  /  DBili  x   /  AST  33  /  ALT  15  /  AlkPhos  250[H]  11-25          RADIOLOGY & ADDITIONAL STUDIES: I have reviewed new imaging    PROTEIN CALORIE MALNUTRITION PRESENT: [ ]mild [ ]moderate [ ]severe [ ]underweight [ ]morbid obesity  https://www.andeal.org/vault/2440/web/files/ONC/Table_Clinical%20Characteristics%20to%20Document%20Malnutrition-White%20JV%20et%20al%202012.pdf    Height (cm): 167.6 (11-25-24 @ 08:36), 167.6 (06-17-24 @ 23:37), 167.6 (03-25-24 @ 09:28)  Weight (kg): 49.5 (11-25-24 @ 01:02), 55.8 (09-21-24 @ 19:58), 53.5 (06-17-24 @ 23:37)  BMI (kg/m2): 17.6 (11-25-24 @ 08:36), 17.6 (11-25-24 @ 01:02), 19.9 (09-21-24 @ 19:58)    [ ]PPSV2 < or = to 30% [ ]significant weight loss  [ ]poor nutritional intake  [ ]anasarca      [ ]Artificial Nutrition      Palliative Care Spiritual/Emotional Screening Tool Question  Severity (0-4):                    OR                    [ x] Unable to determine. Will assess at later time if appropriate.  Score of 2 or greater indicates recommendation of Chaplaincy and/or SW referral  Chaplaincy Referral: [ ] Yes [ ] Refused [ ] Following     Caregiver [place]:  [ ] Yes [ ] No    OR    [x ] Unable to determine. Will assess at later time if appropriate.  Social Work Referral [ ]  Patient and Family Centered Care Referral [ ]    Anticipatory Grief Present: [ ] Yes [ ] No    OR     [ x] Unable to determine. Will assess at later time if appropriate.  Social Work Referral [ ]  Patient and Family Centered Care Referral [ ]    REFERRALS:   [ ]Chaplaincy  [ ]Hospice  [ ]Child Life  [ ]Social Work  [ ]Case management [ ]Holistic Therapy     Palliative care education provided to patient and/or family

## 2024-11-25 NOTE — PHARMACOTHERAPY INTERVENTION NOTE - COMMENTS
Clarified with patient if she was aware of any allergies to any antibiotics. Per patient's mother, no known allergies. Will continue to order daptomycin.    Milton Yuen, PharmD, [facility]  Clinical Pharmacy Specialist, Infectious Diseases  Tele-Antimicrobial Stewardship Program (Tele-ASP)  Tele-ASP Phone: [phone]

## 2024-11-25 NOTE — CONSULT NOTE ADULT - ASSESSMENT
Patient is a 68-year-old male with a past medical history of DM, cataracts, neuropathy, and metastatic [place] [facility] [place] presenting for altered mental status.     Discussed with patient's daughter,  Jazlyn Rene. She is a dermatologist in [place] but is familiar with our palliative care team from when she did her internship at [employer]. She states that patient and his spouse have always been resistant to discussing GOC for cultural reasons. She thinks that at this time, the palliative team should introduce ourselves rather than aggressive GOC discussions in order to maintain good rapport. We subsequently called patient's spouse (Racquel) for her the join the conversation. She states that patient's oncologist have put treatment on hold in order for patient to regain his strength. She is hoping that this will happen, and she presently has no concerns regarding the patient's health. She states that she also has some familiarity with palliative care as she is an RN. She would like for patient to be involved in conversations that involve medical decision-making.     Plan:  - patient presently denies pain  - patient's spouse presently not ready for GOC discussions    - palliative care contact information provided to spouse and daughter  - please notify palliative care team if patient/spouse are more amenable to discussions    - at this time, they are hoping for additional cancer directed therapy and all life-prolonging interventions    Palliative care will continue to follow peripherally.   Please call [phone] with questions or concerns 24/7.   _____________  Koko Hernandez MD  Palliative Medicine  [facility]   of Geriatric and Palliative Medicine  [phone]

## 2024-11-25 NOTE — CONSULT NOTE ADULT - ASSESSMENT
Assessment and Plan  Case of a 68 year old male patient known to have history of DM, DN, cataracts, metastatic cholangiocarcinoma with pseudocirrhosis/ascites and hx of SBP and cholangitis (s/p peritoneal drain and biliary/hepatic duct drains) who was brought to the ED on 11/24 for evaluation of lethargy and episodes of confusion, found to have stable liver enzymes and evidence of large ascites on imaging. We are consulted in setting of above findings.      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 s/p peritoneal drain; biliary/hepatic duct drain; and axios from GB to duodenum [place] [facility]  * Vitals: 100/65mmHg, HR 99 bpm, T 36.7 degrees on 11/25  * Labs: WBC 7.66, Hb 8.3, Plt 241, Na 137, K 4.5, BUN 31, Cr 1.7 on 11/25  * Liver enzymes: 0.7/250/33/15 on 11/25  * INR 1.15 on 11/24   * LA 3.9 -> 3.4 on 11/25  * Serum ammonia 71 on 11/24  * Blood culture received 11/24  * US Abdomen Upper Quadrant Right (06.18.24 @ 02:49) Liver: Partially imaged. Partially imaged stents. No hepatic collection on provided images.Bile ducts: Common bile duct visualized. Intrahepatic biliary ducts are without significant dilatation.  * CT Abdomen and Pelvis w/ IV Cont (11.24.24 @ 14:43) >large abdominal ascites occupying the entire peritoneum. Questionable reactive changes of the small bowel due to ascites. No bowel obstruction.Drain from the level of the gallbladder to the duodenum with associatedmetal artifact obscuring evaluation. Biliary duct stents are also noted. questionable scrotal wall edema. Correlate with physical exam. Mildly nodular liver may be seen with cirrhosis and associated varices as described.  * Previous colonoscopy was last year; could not recall last EGD  * No FHx of GI cancers    RECOMMENDATIONS  - [place] [facility]  - Trend liver enzymes. Check acute hepatitis panel  - Recommend goals of care. Recommend oncology/palliative evaluation  - For ascites: recommend serial abdominal exams. Recommend tap. If concern for infection, would assess need to remove peritoneal drain if recommended per ID. Continue IV antibiotics for now; monitor for fever; monitor MAP and keep > 65mmHg; follow up blood cultures   - For encephalopathy: Monitor BM and avoid constipation. Continue lactulose 20mg Q6h and miralax 17g QD  - For varices: outpatient follow [place] [facility]  - Advance diet as tolerated  - Monitor for pain: management per team  - Monitor for nausea: consider antiemetics PRN if QTc is within normal  - Continue Protonix 40mg QD for GI Prophylaxis; avoid NSAIDs  - Trend CBC and transfuse as needed; keep active type and screen. Check anemia workup (iron studies)  - Follow up with [facility] [place] or at our [facility] located at [address] [place] [address]. Phone Number: [phone]        Thank you for your consult.  - Please note that plan was communicated with medical team.   - Please reach GI on [phone] during weekdays till 5pm.  - Please call the GI service line after 5pm on Weekdays and anytime on Weekends: [phone].      Cameron Nair MD  PGY - 5 Gastroenterology Fellow   [facility]     Assessment and Plan  Case of a 68 year old male patient known to have history of DM, DN, cataracts, metastatic cholangiocarcinoma with pseudocirrhosis/ascites and hx of SBP and cholangitis (s/p peritoneal drain and biliary/hepatic duct drains) who was brought to the ED on 11/24 for evaluation of lethargy and episodes of confusion, found to have stable liver enzymes and evidence of large ascites on imaging. We are consulted in setting of above findings.      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 s/p peritoneal drain; biliary/hepatic duct drain; and axios from GB to duodenum at [facility] [place]  * Vitals: 100/65mmHg, HR 99 bpm, T 36.7 degrees on 11/25  * Labs: WBC 7.66, Hb 8.3, Plt 241, Na 137, K 4.5, BUN 31, Cr 1.7 on 11/25  * Liver enzymes: 0.7/250/33/15 on 11/25  * INR 1.15 on 11/24   * LA 3.9 -> 3.4 on 11/25  * Serum ammonia 71 on 11/24  * Blood culture received 11/24  * US Abdomen Upper Quadrant Right (06.18.24 @ 02:49) Liver: Partially imaged. Partially imaged stents. No hepatic collection on provided images.Bile ducts: Common bile duct visualized. Intrahepatic biliary ducts are without significant dilatation.  * CT Abdomen and Pelvis w/ IV Cont (11.24.24 @ 14:43) >large abdominal ascites occupying the entire peritoneum. Questionable reactive changes of the small bowel due to ascites. No bowel obstruction.Drain from the level of the gallbladder to the duodenum with associatedmetal artifact obscuring evaluation. Biliary duct stents are also noted. questionable scrotal wall edema. Correlate with physical exam. Mildly nodular liver may be seen with cirrhosis and associated varices as described.  * Previous colonoscopy was last year; could not recall last EGD  * No FHx of GI cancers    RECOMMENDATIONS  - Obtain records from [facility] [place]  - Trend liver enzymes. Check acute hepatitis panel  - Recommend goals of care. Recommend oncology/palliative evaluation  - For ascites: recommend serial abdominal exams. Recommend tap and ID evaluation in setting of reported hx of SBP/cholangitis (with current peritoneal drain/stents). If concern for infection, would assess need to remove peritoneal drain if recommended per ID. Continue IV antibiotics for now; monitor for fever; monitor MAP and keep > 65mmHg; follow up blood cultures   - For encephalopathy: Monitor BM and avoid constipation. Continue lactulose 20mg Q6h and miralax 17g QD  - For varices: outpatient follow up at [facility] [place]  - Advance diet as tolerated  - Monitor for pain: management per team  - Monitor for nausea: consider antiemetics PRN if QTc is within normal  - Continue Protonix 40mg QD for GI Prophylaxis; avoid NSAIDs  - Trend CBC and transfuse as needed; keep active type and screen. Check anemia workup (iron studies)  - Follow up with [facility] team or at our [facility] located at [address] [place] [address]. Phone Number: [phone]        Thank you for your consult.  - Please note that plan was communicated with medical team.   - Please reach GI on [phone] during weekdays till 5pm.  - Please call the GI service line after 5pm on Weekdays and anytime on Weekends: [phone].      Cameron Nair MD  PGY - 5 Gastroenterology Fellow   [facility]

## 2024-11-25 NOTE — CONSULT NOTE ADULT - SUBJECTIVE AND OBJECTIVE BOX
Gastroenterology Initial Consult Note      Location: [facility] 3B 017 B [facility]  Patient Name: MARIAM IVORY  Age: 68y  Gender: Male      Chief Complaint  Patient is a 68y old Male who presents with a chief complaint of Altered mental status (25 Nov 2024 11:26)  Primary diagnosis of Disorientation      Reason for Consult  Cholangiocarcinoma      History of Present Illness  68 year old male patient known to have history of DM, DN, cataracts, metastatic cholangiocarcinoma with pseudocirrhosis/ascites and hx of SBP and cholangitis (s/p peritoneal drain and biliary/hepatic duct drains) who was brought to the ED on 11/24 for evaluation of lethargy and episodes of confusion, found to have stable liver enzymes and evidence of large ascites on imaging. We are consulted in setting of above findings.  Summary:  * Diagnosed with metastatic cholangiocarcinoma last year. s/p peritoneal drain; biliary/hepatic duct drain; and axios from GB to duodenum at [facility]  * Vitals: 100/65mmHg, HR 99 bpm, T 36.7 degrees on 11/25  * Labs: WBC 7.66, Hb 8.3, Plt 241, Na 137, K 4.5, BUN 31, Cr 1.7 on 11/25  * Liver enzymes: 0.7/250/33/15 on 11/25  * INR 1.15 on 11/24   * LA 3.9 -> 3.4 on 11/25  * Serum ammonia 71 on 11/24  * Blood culture received 11/24  * US Abdomen Upper Quadrant Right (06.18.24 @ 02:49) Liver: Partially imaged. Partially imaged stents. No hepatic collection on provided images.Bile ducts: Common bile duct visualized. Intrahepatic biliary ducts are without significant dilatation.  * CT Abdomen and Pelvis w/ IV Cont (11.24.24 @ 14:43) >large abdominal ascites occupying the entire peritoneum. Questionable reactive changes of the small bowel due to ascites. No bowel obstruction.Drain from the level of the gallbladder to the duodenum with associatedmetal artifact obscuring evaluation. Biliary duct stents are also noted. questionable scrotal wall edema. Correlate with physical exam. Mildly nodular liver may be seen with cirrhosis and associated varices as described.  * Previous colonoscopy was last year; could not recall last EGD  * No FHx of GI cancers        Prior EGD:  as below      Prior Colonoscopy:  as below      Past Medical and Surgical History:  History of medical problems  dm type 2, bile duct cancer on chemo, cataracts, gerd, neuropathy (crawling sensation)    H/O inguinal hernia repair  right groin        Home Medications:  Home Medications:  dorzolamide 2% ophthalmic solution: 1 drop(s) in each eye 2 times a day to right eye (24 Nov 2024 22:08)  ergocalciferol 1.25 mg (50,000 intl units) oral tablet: orally once a day (24 Nov 2024 22:08)  Farxiga 10 mg oral tablet: 1 tab(s) orally once a day (24 Nov 2024 22:08)  Megace 20 mg oral tablet: 1 tab(s) orally once a day (24 Nov 2024 22:08)  MiraLax oral powder for reconstitution: 17 gram(s) orally once a day (24 Nov 2024 22:08)  Prednisol 1% ophthalmic solution: 1 drop(s) in each affected eye 2 times a day to right eye (24 Nov 2024 22:08)  Protonix 40 mg oral delayed release tablet: 1 tab(s) orally once a day (24 Nov 2024 22:08)  timolol hemihydrate 0.25% ophthalmic solution: 1 drop(s) in each affected eye 2 times a day to right eye (24 Nov 2024 22:08)      Social History:  Tobacco: denies  Alcohol: denies  Drugs: denies      Allergies:  cefepime (Pruritus)      Family History:  no GI cancers        Vital Signs in the last 24 hours   Vitals Summary T(C): 36.7 (11-25-24 @ 05:30), Max: 36.7 (11-25-24 @ 05:30)  HR: 99 (11-25-24 @ 05:30) (68 - 99)  BP: 100/65 (11-25-24 @ 05:30) (100/65 - 128/81)  RR: 18 (11-25-24 @ 05:30) (17 - 18)  SpO2: 98% (11-25-24 @ 05:30) (98% - 100%)  Vent Data   Intake/ Output   Measurements Height (cm): 167.6 (11-25-24 @ 08:36)  Weight (kg): 49.5 (11-25-24 @ 01:02)  BMI (kg/m2): 17.6 (11-25-24 @ 08:36)  BSA (m2): 1.55 (11-25-24 @ 08:36)      Physical Exam  * General Appearance: Alert, interactive but slow, oriented to time, place, and person, in no acute distress  * Eyes: PERRL, conjunctiva/corneas clear, EOM's intact, fundi benign, both eyes  * Lungs: Good bilateral air entry, normal breath sounds   * Heart: Regular Rate and Rhythm, normal S1 and S2, no audible murmur, rub, or gallop  * Abdomen: Symmetric, softly distended, non-tender, bowel sounds active all four quadrants  * Extremities: lower extremity pitting edema bilaterally      Investigations   Laboratory Workup      - CBC:                        8.3    7.66  )-----------( 241      ( 25 Nov 2024 06:15 )             25.1       - Hgb Trend:  8.3  11-25-24 @ 06:15  8.0  11-24-24 @ 15:42        - Chemistry:  11-25    137  |  108  |  31[H]  ----------------------------<  152[H]  4.5   |  20  |  1.7[H]    Ca    7.8[L]      25 Nov 2024 06:15  Mg     2.1     11-25    TPro  7.2  /  Alb  2.0[L]  /  TBili  0.7  /  DBili  x   /  AST  33  /  ALT  15  /  AlkPhos  250[H]  11-25    Liver panel trend:  TBili 0.7   /   AST 33   /   ALT 15   /   AlkP 250   /   Tptn 7.2   /   Alb 2.0    /   DBili --      11-25  TBili 1.1   /   AST 37   /   ALT 16   /   AlkP 258   /   Tptn 7.7   /   Alb 2.1    /   DBili --      11-24      - Coagulation Studies:  PT/INR - ( 24 Nov 2024 15:42 )   PT: 13.60 sec;   INR: 1.15 ratio         PTT - ( 24 Nov 2024 15:42 )  PTT:34.6 sec    - ABG:      - Cardiac Markers:        Microbiological Workup  Urinalysis Basic - ( 25 Nov 2024 10:18 )    Color: Yellow / Appearance: Clear / SG: >1.030 / pH: x  Gluc: x / Ketone: Negative mg/dL  / Bili: Negative / Urobili: 0.2 mg/dL   Blood: x / Protein: 30 mg/dL / Nitrite: Negative   Leuk Esterase: Negative / RBC: 3 /HPF / WBC 8 /HPF   Sq Epi: x / Non Sq Epi: 0 /HPF / Bacteria: Negative /HPF        Culture - Blood (collected 24 Nov 2024 15:53)  Source: .Blood BLOOD  Gram Stain (25 Nov 2024 11:37):    Growth in anaerobic bottle: Gram Negative Rods  Preliminary Report (25 Nov 2024 11:37):    Growth in anaerobic bottle: Gram Negative Rods    Direct identification is available within approximately 3-5    hours either by Blood Panel Multiplexed PCR or Direct    MALDI-TOF. Details: [email or web address]    Urinalysis with Rflx Culture (collected 24 Nov 2024 14:37)        Radiological Workup  CT Abdomen and Pelvis w/ IV Cont:   ACC: [id number] EXAM:  CT ABDOMEN AND PELVIS IC   ORDERED BY: RA WING     PROCEDURE DATE:  11/24/2024          INTERPRETATION:  Clinical Indication: Biliary duct cancer.    Technique: Multidetector-row CT images of the abdomen and pelvis were   obtained from the xiphoid through the symphysis pubis following the   administration of intravenous contrast. No oral contrast was   administered.  Coronal and sagittal reconstructions were performed.    Contrast: 95 cc Omnipaque 350 non-ionic intravenous contrast.    Comparison: CT abdomen and pelvis 6/18/2024.    Findings:    01. LIVER: Mildly nodular liver. No visualized lesion.  02. SPLEEN: Normal.  03. PANCREAS: Normal.  04. GALLBLADDER/BILIARY TREE: Drain from the level of the gallbladder to   the duodenum with associated metal artifact obscuring evaluation. Biliary   duct stents are noted. Pneumobilia is also noted.  05. ADRENALS: Normal.  06. KIDNEYS: Symmetric enhancement.  No hydronephrosis or renal stone.   Right renal cysts.  07. LYMPHADENOPATHY/RETROPERITONEUM: No enlarged lymph nodes.  08. BOWEL: No bowel obstruction. Questionable reactive small bowel due to   large ascites. Right abdominal drain. Small hiatal hernia.  09. PELVIC VISCERA: Prostate measures 4.1 cm in transverse dimension   (series 3 image 148). Under distended urinary bladder. Questionable   scrotal wall edema.  10. PELVIC LYMPH NODES: No enlarged lymph nodes.  11. VASCULATURE: No abdominal aortic aneurysm. Atherosclerotic disease is   the aorta and its branches. Perigastric, paraesophageal and right   abdominal varices. Likely perisplenic varices.  12. PERITONEUM/ABDOMINAL WALL: Large ascites occupying the entire   peritoneum.  13. SKELETAL: Degenerative changes.  14. LUNG BASES: Areas of small atelectasis.    IMPRESSION:    Large abdominal ascites occupying the entire peritoneum. Questionable   reactive changes of the small bowel due to ascites. No bowel obstruction.    Drain from the level of the gallbladder to the duodenum with associated  metal artifact obscuring evaluation. Biliary duct stents are also noted.    Questionable scrotal wall edema. Correlate with physical exam.    Mildly nodular liver may be seen with cirrhosis and associated varices as   described.          --- End ofReport ---            CHELO CARLSON MD; Attending Radiologist  This document has been electronically signed. Nov 24 2024  5:05PM (11-24-24 @ 14:43)            Current Medications  Standing Medications  cefTRIAXone   IVPB 1000 milliGRAM(s) IV Intermittent every 24 hours  dextrose 5%. 1000 milliLiter(s) (50 mL/Hr) IV Continuous <Continuous>  dextrose 5%. 1000 milliLiter(s) (100 mL/Hr) IV Continuous <Continuous>  dextrose 50% Injectable 25 Gram(s) IV Push once  dextrose 50% Injectable 12.5 Gram(s) IV Push once  dextrose 50% Injectable 25 Gram(s) IV Push once  dorzolamide 2% Ophthalmic Solution 1 Drop(s) Right EYE every 8 hours  glucagon  Injectable 1 milliGRAM(s) IntraMuscular once  heparin   Injectable 5000 Unit(s) SubCutaneous every 12 hours  influenza  Vaccine (HIGH DOSE) 0.5 milliLiter(s) IntraMuscular once  insulin lispro (ADMELOG) corrective regimen sliding scale   SubCutaneous three times a day before meals  lactulose Syrup 20 Gram(s) Oral four times a day  megestrol 20 milliGRAM(s) Oral daily  metroNIDAZOLE  IVPB 500 milliGRAM(s) IV Intermittent every 12 hours  pantoprazole    Tablet 40 milliGRAM(s) Oral before breakfast  polyethylene glycol 3350 17 Gram(s) Oral daily  prednisoLONE acetate 1% Suspension 1 Drop(s) Right EYE two times a day  timolol 0.25% Solution 1 Drop(s) Right EYE two times a day  vancomycin  IVPB 750 milliGRAM(s) IV Intermittent every 24 hours    PRN Medications  dextrose Oral Gel 15 Gram(s) Oral once PRN Blood Glucose LESS THAN 70 milliGRAM(s)/deciliter  melatonin 3 milliGRAM(s) Oral at bedtime PRN Insomnia    Singles Doses Administered  (ADM OVERRIDE) 1 each &lt;see task&gt; GiveOnce  ciprofloxacin   IVPB 400 milliGRAM(s) IV Intermittent once  lactated ringers Bolus 1000 milliLiter(s) IV Bolus once

## 2024-11-25 NOTE — PATIENT PROFILE ADULT - FALL HARM RISK - HARM RISK INTERVENTIONS

## 2024-11-25 NOTE — PROGRESS NOTE ADULT - ATTENDING COMMENTS
Medicine Attending Addendum  Patient was seen and examined with medicine team.  Nursing records reviewed. I agree with the resident/PA/NP's note including past medical history, home medications, social history, allergies, surgical history, family history, and review of system. I have reviewed relevant vitals, laboratory values, imaging studies, and microbiology.   - Above resident's note was edited by me  - rest of A/P as per detailed housestaff note above except above modifications    Total time spent to complete patient's bedside assessment, reviewed medical chart, discussed medical plan of care with team was 45 minutes with >50% of time spent face to face with patient, discussion with patient/family and/or coordination of care.

## 2024-11-25 NOTE — PHARMACOTHERAPY INTERVENTION NOTE - COMMENTS
As per policy, ordered a creatine kinase level for 11/26 AM in setting of daptomycin therapy.    Milton Yuen, PharmD, [facility]  Clinical Pharmacy Specialist, Infectious Diseases  Tele-Antimicrobial Stewardship Program (Tele-ASP)  Tele-ASP Phone: [phone]

## 2024-11-25 NOTE — CHART NOTE - NSCHARTNOTEFT_GEN_A_CORE
Spoke to family (both daughters and wife) about patient condition. At this time they want everything done and full code but understand the importance of palliative care following and having ongoing discussions about family goc. They are open to speaking to the palliative team and having them follow.

## 2024-11-25 NOTE — PATIENT PROFILE ADULT - FALL HARM RISK - DEVICES
[FreeTextEntry1] : Physical examination \par General: No acute distress, Awake, Alert. \par \par Mental status \par Awake, alert, and oriented to person, time and place, Normal attention span and concentration, Recent and remote memory intact, Language intact, Fund of knowledge intact. \par Cranial Nerves \par II: VFF \par III, IV, VI: PERRL, EOMI. \par V: Facial sensation is normal B/L. \par VII: Facial strength is normal B/L. \par VIII: Gross hearing is intact. \par IX, X: Palate is midline and elevates symmetrically. \par XI: Trapezius normal strength. \par XII: Tongue midline without atrophy or fasciculations. \par \par Motor exam \par Muscle tone - no evidence of rigidity or resistance in all 4 extremities. \par No atrophy or fasciculations \par Muscle Strength: arms and legs, proximal and distal flexors and extensors are normal \par No UE drift.\par \par Reflexes \par All present, normal, and symmetrical. \par Plantars right: mute. \par Plantars left: mute. \par Absent ankle jerks. \par \par Coordination \par Finger to nose: Normal. \par Heel to shin: Normal. \par \par Sensory \par Intact sensation to vibration and cold.\par \par Gait \par Normal\par 
Walker

## 2024-11-25 NOTE — PHARMACOTHERAPY INTERVENTION NOTE - COMMENTS
Recommend continuing metronidazole pending ID consult review.    Milton Yuen, PharmD, [facility]  Clinical Pharmacy Specialist, Infectious Diseases  Tele-Antimicrobial Stewardship Program (Tele-ASP)  Tele-ASP Phone: [phone]

## 2024-11-25 NOTE — PHARMACOTHERAPY INTERVENTION NOTE - COMMENTS
Recommended escalating from vancomycin to daptomycin 500mg IV q48h, dosed based on an estimated creatinine clearance of ~28mL/min, in the setting of VRE bacteremia.    Milton Yuen, PharmD, [facility]  Clinical Pharmacy Specialist, Infectious Diseases  Tele-Antimicrobial Stewardship Program (Tele-ASP)  Tele-ASP Phone: [phone]  hair removal not indicated

## 2024-11-25 NOTE — PROGRESS NOTE ADULT - ASSESSMENT
68-year-old male with a past medical history of DM, cataracts, neuropathy, and metastatic cholangiocarcinoma (follows at [facility]) presenting for altered mental status. daughter Jazlyn López (physician, [phone]) who reports that patient has had short periods of confusion over the last few months, usually caused by infections (cholangitis, SBP...). This morning, patient woke up and was more lethargic than usual, with delayed responses when spoken to. He has been around sick contacts so they decided to bring him in to r/o infection as cause of lethargy. As per daughter, patient has a peritoneal drain, he drains himself 1L of peritoneal fluid daily. Last MR head to check for mets was done 2 months ago and was negative.     #Lethargy   #Metastatic cholangiocarcinoma s/p CBD and hepatic duct stenting s/p cholecystoduodenostomy s/p peritoneal drain (currently in place)  #Large abdominal ascites   #Hx of hepatic abscess   * Follows at [facility] in [place] since May 2022 for cholangiocarcinoma  * Follows with Dr Sanchez (Advanced GI) and Dr. Littlejohn (oncologist) at [facility]  * Deemed not a surgical candidate because of extensive metastatic disease  * Pt has a peritoneal drain in place and drains himself 1L of peritoneal fluid daily   * Last MR Head around 2 months ago, no mets  - Vitals on arrival: /72, HR 97, T 97, RR 17, SpO2 98% on RA  - On admission WBC 8, lactate 3.9, t bili 1.1, alk phos 258, ammonia 71  - UA negative, RVP negative  - CXR (11.24.24 @ 13:25): Low lung volumes without focal consolidation or pneumothorax.  - CTAP w/ IV Cont (11.24.24 @ 14:43): Large abdominal ascites occupying the entire peritoneum. Questionable reactive changes of the small bowel due to ascites. No bowel obstruction. Drain from the level of the gallbladder to the duodenum with associated metal artifact obscuring evaluation. Biliary duct stents are also noted. Questionable scrotal wall edema. Correlate with physical exam. Mildly nodular liver may be seen with cirrhosis and associated varices as described.  - CT Head No Cont (11.24.24 @ 14:43): Allowing for motion artifact, unremarkable study.  - s/p Ciprofloxacin 400mg, Flagyl 500mg, LR 1L bolus in the ED  - Continue Ceftriaxone, Flagyl and Vanco for now   - Follow up blood cultures (received)  - Follow up repeat lactate, procalcitonin   - Lactulose 20g q6 for 2 days  - Monitor BMs  - Advanced GI consult (for draining ascites from peritoneal drain placed by advanced GI at [facility]). Send fluid studies.   - Palliative care consult   - If patient remains lethargic or develops confusion, consider MR Head to r/o metastatic disease     #VIPUL  - Creatinine on admission 1.8  - Creatinine 1.2 in June  - s/p 1L LR bolus in ED  - Monitor BMP  - send urine lytes, If Na < 10 consider hepatorenal syndrome     #DM   - On home Farxiga 10mg daily  - ISS for now, target -180  - Monitor FS and adjust accordingly    #Cataract  - Continue home eye drop      #DVT ppx: Heparin  #GI ppx: Pantoprazole  #Diet: CC  #Activity: IAT  #Code: FULL code, confirmed with daughter Jazlyn López (physician, [phone])  #Dispo: Medicine   #Medrec: Confirmed with daughter        68-year-old male with a past medical history of DM, cataracts, neuropathy, and metastatic cholangiocarcinoma (follows at [facility]) presenting for altered mental status. daughter Jazlyn López (physician, [phone]) who reports that patient has had short periods of confusion over the last few months, usually caused by infections (cholangitis, SBP...). This morning, patient woke up and was more lethargic than usual, with delayed responses when spoken to. He has been around sick contacts so they decided to bring him in to r/o infection as cause of lethargy. As per daughter, patient has a peritoneal drain, he drains himself 1L of peritoneal fluid daily. Last MR head to check for mets was done 2 months ago and was negative.     #Lethargy   #Metastatic cholangiocarcinoma s/p CBD and hepatic duct stenting s/p cholecystoduodenostomy s/p peritoneal drain (currently in place)  #Large abdominal ascites   #Hx of hepatic abscess   * Follows at [facility] in [place] since May 2022 for cholangiocarcinoma  * Follows with Dr Sanchez (Advanced GI) and Dr. Littlejohn (oncologist) at [facility]  * Deemed not a surgical candidate because of extensive metastatic disease  * Pt has a peritoneal drain in place and drains himself 1L of peritoneal fluid daily   * Last MR Head around 2 months ago, no mets  - Vitals on arrival: /72, HR 97, T 97, RR 17, SpO2 98% on RA  - On admission WBC 8, lactate 3.9, t bili 1.1, alk phos 258, ammonia 71  - UA negative, RVP negative  - CXR (11.24.24 @ 13:25): Low lung volumes without focal consolidation or pneumothorax.  - CTAP w/ IV Cont (11.24.24 @ 14:43): Large abdominal ascites occupying the entire peritoneum. Questionable reactive changes of the small bowel due to ascites. No bowel obstruction. Drain from the level of the gallbladder to the duodenum with associated metal artifact obscuring evaluation. Biliary duct stents are also noted. Questionable scrotal wall edema. Correlate with physical exam. Mildly nodular liver may be seen with cirrhosis and associated varices as described.  - CT Head No Cont (11.24.24 @ 14:43): Allowing for motion artifact, unremarkable study.  - s/p Ciprofloxacin 400mg, Flagyl 500mg, LR 1L bolus in the ED  - Continue Ceftriaxone, Flagyl and Vanco for now   - Follow up blood cultures (received)  - Follow up repeat lactate, procalcitonin   - Lactulose 20g q6 for 2 days  - Monitor BMs  - Advanced GI consult (for draining ascites from peritoneal drain placed by advanced GI at [facility]). Send fluid studies.   - Palliative care consult   - If patient remains lethargic or develops confusion, consider MR Head to r/o metastatic disease     #VIPUL  - Creatinine on admission 1.8  - Creatinine 1.2 in June  - s/p 1L LR bolus in ED  - Monitor BMP  - send urine lytes and studies. results reviewed, Na >10 so low likelihood of hepatorenal process     #DM   - On home Farxiga 10mg daily  - ISS for now, target -180  - Monitor FS and adjust accordingly    #Cataract  - Continue home eye drop      #DVT ppx: Heparin  #GI ppx: Pantoprazole  #Diet: CC  #Activity: IAT  #Code: FULL code, confirmed with daughter Jazlyn López (physician, [phone])  #Dispo: Medicine   #Medrec: Confirmed with daughter        68-year-old male with a past medical history of DM, cataracts, neuropathy, and metastatic cholangiocarcinoma (follows at [facility]) presenting for altered mental status. daughter Jazlyn López (physician, [phone]) who reports that patient has had short periods of confusion over the last few months, usually caused by infections (cholangitis, SBP...). This morning, patient woke up and was more lethargic than usual, with delayed responses when spoken to. He has been around sick contacts so they decided to bring him in to r/o infection as cause of lethargy. As per daughter, patient has a peritoneal drain, he drains himself 1L of peritoneal fluid daily. Last MR head to check for mets was done 2 months ago and was negative.     #Lethargy   #Metastatic cholangiocarcinoma s/p CBD and hepatic duct stenting s/p cholecystoduodenostomy s/p peritoneal drain (currently in place)  #Large abdominal ascites   #Hx of hepatic abscess   * Follows at [facility] in [place] since May 2022 for cholangiocarcinoma  * Follows with Dr Sanchez (Advanced GI) and Dr. Littlejohn (oncologist) at [facility]  * Deemed not a surgical candidate because of extensive metastatic disease  * Pt has a peritoneal drain in place and drains himself 1L of peritoneal fluid daily   * Last MR Head around 2 months ago, no mets  - Vitals on arrival: /72, HR 97, T 97, RR 17, SpO2 98% on RA  - On admission WBC 8, lactate 3.9, t bili 1.1, alk phos 258, ammonia 71  - UA negative, RVP negative  - CXR (11.24.24 @ 13:25): Low lung volumes without focal consolidation or pneumothorax.  - CTAP w/ IV Cont (11.24.24 @ 14:43): Large abdominal ascites occupying the entire peritoneum. Questionable reactive changes of the small bowel due to ascites. No bowel obstruction. Drain from the level of the gallbladder to the duodenum with associated metal artifact obscuring evaluation. Biliary duct stents are also noted. Questionable scrotal wall edema. Correlate with physical exam. Mildly nodular liver may be seen with cirrhosis and associated varices as described.  - CT Head No Cont (11.24.24 @ 14:43): Allowing for motion artifact, unremarkable study.  - s/p Ciprofloxacin 400mg, Flagyl 500mg, LR 1L bolus in the ED  - Continue Ceftriaxone, Flagyl and Vanco   - Blood culture positive for Gram + and Gram - bugs. ID consult placed   - May need to remove drain if possible infection source, appreciate ID recs   - Follow up repeat lactate, procalcitonin   - Lactulose 20g q6 for 2 days  - Monitor BMs  - Advanced GI consult (for draining ascites from peritoneal drain placed by advanced GI at [facility]). Send fluid studies.   - Palliative care consult   - If patient remains lethargic or develops confusion, consider MR Head to r/o metastatic disease     #VIPUL  - Creatinine on admission 1.8  - Creatinine 1.2 in June  - s/p 1L LR bolus in ED  - Monitor BMP  - send urine lytes and studies. results reviewed, Na >10 so low likelihood of hepatorenal process     #DM   - On home Farxiga 10mg daily  - ISS for now, target -180  - Monitor FS and adjust accordingly    #Cataract  - Continue home eye drop      #DVT ppx: Heparin  #GI ppx: Pantoprazole  #Diet: CC  #Activity: IAT  #Code: FULL code, confirmed with daughter Jazlyn López (physician, [phone])  #Dispo: Medicine   #Medrec: Confirmed with daughter

## 2024-11-25 NOTE — PHARMACOTHERAPY INTERVENTION NOTE - COMMENTS
Recommended consulting ID in the setting of VRE bacteremia.    Milton Yuen, PharmD, [facility]  Clinical Pharmacy Specialist, Infectious Diseases  Tele-Antimicrobial Stewardship Program (Tele-ASP)  Tele-ASP Phone: [phone]

## 2024-11-25 NOTE — PROGRESS NOTE ADULT - SUBJECTIVE AND OBJECTIVE BOX
SUBJECTIVE/OVERNIGHT EVENTS  Today is hospital day 1d. This morning patient was seen and examined at bedside, resting comfortably in bed. No acute or major events overnight.    MEDICATIONS  STANDING MEDICATIONS  cefTRIAXone   IVPB 1000 milliGRAM(s) IV Intermittent every 24 hours  dextrose 5%. 1000 milliLiter(s) IV Continuous <Continuous>  dextrose 5%. 1000 milliLiter(s) IV Continuous <Continuous>  dextrose 50% Injectable 25 Gram(s) IV Push once  dextrose 50% Injectable 12.5 Gram(s) IV Push once  dextrose 50% Injectable 25 Gram(s) IV Push once  dorzolamide 2% Ophthalmic Solution 1 Drop(s) Right EYE every 8 hours  glucagon  Injectable 1 milliGRAM(s) IntraMuscular once  heparin   Injectable 5000 Unit(s) SubCutaneous every 12 hours  influenza  Vaccine (HIGH DOSE) 0.5 milliLiter(s) IntraMuscular once  insulin lispro (ADMELOG) corrective regimen sliding scale   SubCutaneous three times a day before meals  lactulose Syrup 20 Gram(s) Oral four times a day  megestrol 20 milliGRAM(s) Oral daily  metroNIDAZOLE  IVPB 500 milliGRAM(s) IV Intermittent every 12 hours  pantoprazole    Tablet 40 milliGRAM(s) Oral before breakfast  polyethylene glycol 3350 17 Gram(s) Oral daily  prednisoLONE acetate 1% Suspension 1 Drop(s) Right EYE two times a day  timolol 0.25% Solution 1 Drop(s) Right EYE two times a day  vancomycin  IVPB 750 milliGRAM(s) IV Intermittent every 24 hours    PRN MEDICATIONS  dextrose Oral Gel 15 Gram(s) Oral once PRN  melatonin 3 milliGRAM(s) Oral at bedtime PRN    VITALS  T(F): 98.1 (11-25-24 @ 05:30), Max: 98.1 (11-25-24 @ 05:30)  HR: 99 (11-25-24 @ 05:30) (68 - 99)  BP: 100/65 (11-25-24 @ 05:30) (100/65 - 128/81)  RR: 18 (11-25-24 @ 05:30) (17 - 18)  SpO2: 98% (11-25-24 @ 05:30) (98% - 100%)  POCT Blood Glucose.: 140 mg/dL (11-25-24 @ 07:47)  POCT Blood Glucose.: 155 mg/dL (11-24-24 @ 18:53)    PHYSICAL EXAM    GENERAL: NAD, lying in bed comfortably  HEAD:  Atraumatic, normocephalic  EYES: EOMI, PERRLA,   HEART: Regular rate and rhythm, no murmurs, rubs, or gallops  LUNGS: Unlabored respirations.  Clear to auscultation bilaterally, no crackles, wheezing, or rhonchi  ABDOMEN: Soft, nontender distended  EXTREMITIES: No clubbing, cyanosis, or edema. Trembliing with no asterixis   NERVOUS SYSTEM:  ranges from AOx1 to AOx3  SKIN: No rashes or lesions    LABS             8.3    7.66  )-----------( 241      ( 11-25-24 @ 06:15 )             25.1     137  |  108  |  31  -------------------------<  152   11-25-24 @ 06:15  4.5  |  20  |  1.7    Ca      7.8     11-25-24 @ 06:15  Mg     2.1     11-25-24 @ 06:15    TPro  7.2  /  Alb  2.0  /  TBili  0.7  /  DBili  x   /  AST  33  /  ALT  15  /  AlkPhos  250  /  GGT  x     11-25-24 @ 06:15    PT/INR - ( 11-24-24 @ 15:42 )   PT: 13.60 sec[H];   INR: 1.15 ratio  PTT - ( 11-24-24 @ 15:42 )  PTT:34.6 sec      Urinalysis Basic - ( 25 Nov 2024 10:18 )    Color: Yellow / Appearance: Clear / SG: >1.030 / pH: x  Gluc: x / Ketone: Negative mg/dL  / Bili: Negative / Urobili: 0.2 mg/dL   Blood: x / Protein: 30 mg/dL / Nitrite: Negative   Leuk Esterase: Negative / RBC: 3 /HPF / WBC 8 /HPF   Sq Epi: x / Non Sq Epi: 0 /HPF / Bacteria: Negative /HPF          Urinalysis with Rflx Culture (collected 24 Nov 2024 14:37)      IMAGING

## 2024-11-26 LAB
ALBUMIN FLD-MCNC: 0.5 G/DL — SIGNIFICANT CHANGE UP
GLUCOSE BLDC GLUCOMTR-MCNC: 137 MG/DL — HIGH (ref 70–99)
GLUCOSE BLDC GLUCOMTR-MCNC: 150 MG/DL — HIGH (ref 70–99)
GLUCOSE BLDC GLUCOMTR-MCNC: 168 MG/DL — HIGH (ref 70–99)
GLUCOSE BLDC GLUCOMTR-MCNC: 184 MG/DL — HIGH (ref 70–99)
GLUCOSE FLD-MCNC: 161 MG/DL — SIGNIFICANT CHANGE UP
GRAM STN FLD: ABNORMAL
GRAM STN FLD: ABNORMAL
GRAM STN FLD: SIGNIFICANT CHANGE UP
LDH SERPL L TO P-CCNC: 47 U/L — SIGNIFICANT CHANGE UP
PROT FLD-MCNC: 1.7 G/DL — SIGNIFICANT CHANGE UP
SPECIMEN SOURCE: SIGNIFICANT CHANGE UP
SPECIMEN SOURCE: SIGNIFICANT CHANGE UP

## 2024-11-26 PROCEDURE — 99232 SBSQ HOSP IP/OBS MODERATE 35: CPT

## 2024-11-26 PROCEDURE — 99233 SBSQ HOSP IP/OBS HIGH 50: CPT

## 2024-11-26 RX ORDER — METRONIDAZOLE 500 MG/1
500 TABLET ORAL EVERY 12 HOURS
Refills: 0 | Status: DISCONTINUED | OUTPATIENT
Start: 2024-11-26 | End: 2024-12-03

## 2024-11-26 RX ADMIN — PREDNISOLONE ACETATE 1 DROP(S): 1.2 SUSPENSION/ DROPS OPHTHALMIC at 18:31

## 2024-11-26 RX ADMIN — PANTOPRAZOLE SODIUM 40 MILLIGRAM(S): 40 TABLET, DELAYED RELEASE ORAL at 05:59

## 2024-11-26 RX ADMIN — LACTULOSE 20 GRAM(S): 10 SOLUTION ORAL at 05:58

## 2024-11-26 RX ADMIN — Medication 100 MILLIGRAM(S): at 12:42

## 2024-11-26 RX ADMIN — METRONIDAZOLE 500 MILLIGRAM(S): 500 TABLET ORAL at 18:31

## 2024-11-26 RX ADMIN — Medication 5000 UNIT(S): at 18:32

## 2024-11-26 RX ADMIN — DORZOLAMIDE HYDROCHLORIDE 1 DROP(S): 20 SOLUTION OPHTHALMIC at 05:58

## 2024-11-26 RX ADMIN — MEGESTROL ACETATE 20 MILLIGRAM(S): 40 SUSPENSION ORAL at 12:42

## 2024-11-26 RX ADMIN — Medication 5000 UNIT(S): at 05:58

## 2024-11-26 RX ADMIN — ACETAMINOPHEN, DIPHENHYDRAMINE HCL, PHENYLEPHRINE HCL 3 MILLIGRAM(S): 325; 25; 5 TABLET ORAL at 21:22

## 2024-11-26 RX ADMIN — LACTULOSE 20 GRAM(S): 10 SOLUTION ORAL at 12:41

## 2024-11-26 RX ADMIN — METRONIDAZOLE 100 MILLIGRAM(S): 500 TABLET ORAL at 05:57

## 2024-11-26 RX ADMIN — Medication 1 DROP(S): at 05:58

## 2024-11-26 RX ADMIN — PREDNISOLONE ACETATE 1 DROP(S): 1.2 SUSPENSION/ DROPS OPHTHALMIC at 05:58

## 2024-11-26 RX ADMIN — DORZOLAMIDE HYDROCHLORIDE 1 DROP(S): 20 SOLUTION OPHTHALMIC at 21:20

## 2024-11-26 RX ADMIN — Medication 1: at 12:39

## 2024-11-26 RX ADMIN — POLYETHYLENE GLYCOL 3350 17 GRAM(S): 17 POWDER, FOR SOLUTION ORAL at 12:42

## 2024-11-26 RX ADMIN — LACTULOSE 20 GRAM(S): 10 SOLUTION ORAL at 18:31

## 2024-11-26 RX ADMIN — Medication 1 DROP(S): at 18:31

## 2024-11-26 RX ADMIN — DORZOLAMIDE HYDROCHLORIDE 1 DROP(S): 20 SOLUTION OPHTHALMIC at 12:42

## 2024-11-26 NOTE — PHARMACOTHERAPY INTERVENTION NOTE - COMMENTS
Recommended obtaining a CK at 1600 today since this morning's CK was cancelled, and patient is on daptomycin for VRE faecium bacteremia.

## 2024-11-26 NOTE — PROGRESS NOTE ADULT - ATTENDING COMMENTS
I edited the note.  Time-based billing (NON-critical care).   50 minutes spent on total encounter; more than 50% of the visit was spent counseling and / or coordinating care by the attending physician.  The necessity of the time spent during the encounter on this date of service was due to: Coordination of care.

## 2024-11-26 NOTE — PROGRESS NOTE ADULT - ASSESSMENT
68-year-old male with a past medical history of DM, cataracts, neuropathy, and metastatic cholangiocarcinoma ([facility]) presenting for altered mental status. daughter Jazlyn López (physician, [phone]) who reports that patient has had short periods of confusion over the last few months, usually caused by infections (cholangitis, SBP...). This morning, patient woke up and was more lethargic than usual, with delayed responses when spoken to. He has been around sick contacts so they decided to bring him in to r/o infection as cause of lethargy. As per daughter, patient has a peritoneal drain, he drains himself 1L of peritoneal fluid daily. Last MR head to check for mets was done 2 months ago and was negative.     #Lethargy   #Metastatic cholangiocarcinoma s/p CBD and hepatic duct stenting s/p cholecystoduodenostomy s/p peritoneal drain (currently in place)  #Large abdominal ascites   #Hx of hepatic abscess   * Follows at [facility] in [place] since May 2022 for cholangiocarcinoma  * Follows with Dr Sanchez (Advanced [facility]) and Dr. Littlejohn (oncologist) [facility]  * Deemed not a surgical candidate because of extensive metastatic disease  * Pt has a peritoneal drain in place and drains himself 1L of peritoneal fluid daily   * Last MR Head around 2 months ago, no mets  - Vitals on arrival: /72, HR 97, T 97, RR 17, SpO2 98% on RA  - On admission WBC 8, lactate 3.9, t bili 1.1, alk phos 258, ammonia 71  - UA negative, RVP negative  - CXR (11.24.24 @ 13:25): Low lung volumes without focal consolidation or pneumothorax.  - CTAP w/ IV Cont (11.24.24 @ 14:43): Large abdominal ascites occupying the entire peritoneum. Questionable reactive changes of the small bowel due to ascites. No bowel obstruction. Drain from the level of the gallbladder to the duodenum with associated metal artifact obscuring evaluation. Biliary duct stents are also noted. Questionable scrotal wall edema. Correlate with physical exam. Mildly nodular liver may be seen with cirrhosis and associated varices as described.  - CT Head No Cont (11.24.24 @ 14:43): Allowing for motion artifact, unremarkable study.  - s/p Ciprofloxacin 400mg, Flagyl 500mg, LR 1L bolus in the ED  - Blood culture positive for Gram + and Gram - bugs. ID consult placed   - Continue ceftriaxone, dapti, and flagyl  - May need to remove drain if possible infection source, but it appears clean. appreciate ID recs   - Follow up repeat lactate, procalcitonin   - Lactulose 20g q6 for 2 days  - Monitor BMs  - Palliative care consult appreciated   - **If patient remains lethargic or develops confusion, consider MR Head to r/o metastatic disease. It patient drops in BP, give albumin 100cc +/- IVF***  - 1 L fluid drained yesterday and fluid studies sent. Does not meet criteria for SBP.     #VIPUL  - Creatinine on admission 1.8  - Creatinine 1.2 in June  - s/p 1L LR bolus in ED  - Monitor BMP  - send urine lytes and studies. results reviewed, Na >10 so low likelihood of hepatorenal process     #DM   - On home Farxiga 10mg daily  - ISS for now, target -180  - Monitor FS and adjust accordingly    #Cataract  - Continue home eye drop      #DVT ppx: Heparin  #GI ppx: Pantoprazole  #Diet: CC  #Activity: IAT  #Code: FULL code, confirmed with daughter Jazlyn López (physician, [phone])  #Dispo: Medicine   #Medrec: Confirmed with daughter      68-year-old male with a past medical history of DM, cataracts, neuropathy, and metastatic [place]angi[place] [facility] for altered mental status. daughter Jazlyn López (physician, [phone]) who reports that patient has had short periods of confusion over the last few months, usually caused by infections (cholangitis, SBP...). This morning, patient woke up and was more lethargic than usual, with delayed responses when spoken to. He has been around sick contacts so they decided to bring him in to r/o infection as cause of lethargy. As per daughter, patient has a peritoneal drain, he drains himself 1L of peritoneal fluid daily. Last MR head to check for mets was done 2 months ago and was negative.     #Lethargy   #Metastatic cholangiocarcinoma s/p CBD and hepatic duct stenting s/p cholecystoduodenostomy s/p peritoneal drain (currently in place)  #Large abdominal ascites   #Hx of hepatic abscess   * Follows at [facility] in [place] since May 2022 for cholangiocarcinoma  * Follows with Dr Sanchez (Advanced [facility]) and Dr. Littlejohn ([place]cologist[place] [facility]  * Deemed not a surgical candidate because of extensive metastatic disease  * Pt has a peritoneal drain in place and drains himself 1L of peritoneal fluid daily   * Last MR Head around 2 months ago, no mets  - Vitals on arrival: /72, HR 97, T 97, RR 17, SpO2 98% on RA  - On admission WBC 8, lactate 3.9, t bili 1.1, alk phos 258, ammonia 71  - UA negative, RVP negative  - CXR (11.24.24 @ 13:25): Low lung volumes without focal consolidation or pneumothorax.  - CTAP w/ IV Cont (11.24.24 @ 14:43): Large abdominal ascites occupying the entire peritoneum. Questionable reactive changes of the small bowel due to ascites. No bowel obstruction. Drain from the level of the gallbladder to the duodenum with associated metal artifact obscuring evaluation. Biliary duct stents are also noted. Questionable scrotal wall edema. Correlate with physical exam. Mildly nodular liver may be seen with cirrhosis and associated varices as described.  - CT Head No Cont (11.24.24 @ 14:43): Allowing for motion artifact, unremarkable study.  - s/p Ciprofloxacin 400mg, Flagyl 500mg, LR 1L bolus in the ED  - Blood culture positive for Gram + and Gram - bugs. ID consult placed   - Continue ceftriaxone, dapti, and flagyl  - TTE ordered to r/o infective endocarditis per ID recs  - May need to remove drain if possible infection source, but it appears clean. appreciate ID recs   - Follow up repeat lactate, procalcitonin   - Lactulose 20g q6 for 2 days  - Monitor BMs  - Palliative care consult appreciated   - **If patient remains lethargic or develops confusion, consider MR Head to r/o metastatic disease. It patient drops in BP, give albumin 100cc +/- IVF***  - 1 L fluid drained yesterday and fluid studies sent. Does not meet criteria for SBP.     #VIPUL  - Creatinine on admission 1.8  - Creatinine 1.2 in June  - s/p 1L LR bolus in ED  - Monitor BMP  - send urine lytes and studies. results reviewed, Na >10 so low likelihood of hepatorenal process     #DM   - On home Farxiga 10mg daily  - ISS for now, target -180  - Monitor FS and adjust accordingly    #Cataract  - Continue home eye drop      #DVT ppx: Heparin  #GI ppx: Pantoprazole  #Diet: CC  #Activity: IAT  #Code: FULL code, confirmed with daughter Jazlyn López (physician, [phone])  #Dispo: Medicine   #Medrec: Confirmed with daughter      68-year-old male with a past medical history of DM, cataracts, neuropathy, and metastatic [place]angiocarcinoma [place] [facility] for altered mental status. daughter Jazlyn López (physician, [phone]) who reports that patient has had short periods of confusion over the last few months, usually caused by infections (cholangitis, SBP...). This morning, patient woke up and was more lethargic than usual, with delayed responses when spoken to. He has been around sick contacts so they decided to bring him in to r/o infection as cause of lethargy. As per daughter, patient has a peritoneal drain, he drains himself 1L of peritoneal fluid daily. Last MR head to check for mets was done 2 months ago and was negative.     #Lethargy   #Metastatic cholangiocarcinoma s/p CBD and hepatic duct stenting s/p cholecystoduodenostomy s/p peritoneal drain (currently in place)  #Large abdominal ascites   #Hx of hepatic abscess   * Follows at [facility] in [place] since May 2022 for cholangiocarcinoma  * Follows with Dr Sanchez ([facility]) and Dr. Littlejohn ([place] [facility]  * Deemed not a surgical candidate because of extensive metastatic disease  * Pt has a peritoneal drain in place and drains himself 1L of peritoneal fluid daily   * Last MR Head around 2 months ago, no mets  - Vitals on arrival: /72, HR 97, T 97, RR 17, SpO2 98% on RA  - On admission WBC 8, lactate 3.9, t bili 1.1, alk phos 258, ammonia 71  - UA negative, RVP negative  - CXR (11.24.24 @ 13:25): Low lung volumes without focal consolidation or pneumothorax.  - CTAP w/ IV Cont (11.24.24 @ 14:43): Large abdominal ascites occupying the entire peritoneum. Questionable reactive changes of the small bowel due to ascites. No bowel obstruction. Drain from the level of the gallbladder to the duodenum with associated metal artifact obscuring evaluation. Biliary duct stents are also noted. Questionable scrotal wall edema. Correlate with physical exam. Mildly nodular liver may be seen with cirrhosis and associated varices as described.  - CT Head No Cont (11.24.24 @ 14:43): Allowing for motion artifact, unremarkable study.  - s/p Ciprofloxacin 400mg, Flagyl 500mg, LR 1L bolus in the ED  - Blood culture positive for Gram + and Gram - bugs. ID consult placed   - Continue ceftriaxone, dapti, and flagyl  - TTE ordered to r/o infective endocarditis per ID recs  - May need to remove drain if possible infection source, but it appears clean. appreciate ID recs   - Follow up repeat lactate, procalcitonin   - Lactulose 20g q6 for 2 days  - Monitor BMs  - Palliative care consult appreciated   - **If patient remains lethargic or develops confusion, consider MR Head to r/o metastatic disease. It patient drops in BP, give albumin 100cc +/- IVF***  - 1 L fluid drained yesterday and fluid studies sent. Does not meet criteria for SBP.   - CK ordered for baseline and trend weekly while on Dapto     #VIPUL  - Creatinine on admission 1.8  - Creatinine 1.2 in June  - s/p 1L LR bolus in ED  - Monitor BMP  - send urine lytes and studies. results reviewed, Na >10 so low likelihood of hepatorenal process     #DM   - On home Farxiga 10mg daily  - ISS for now, target -180  - Monitor FS and adjust accordingly    #Cataract  - Continue home eye drop      #DVT ppx: Heparin  #GI ppx: Pantoprazole  #Diet: CC  #Activity: IAT  #Code: FULL code, confirmed with daughter Jazlyn Aaronstephen (physician, [phone])  #Dispo: Medicine   #Medrec: Confirmed with daughter

## 2024-11-26 NOTE — PROGRESS NOTE ADULT - ATTENDING COMMENTS
Medicine Attending Addendum  Patient was seen and examined with medicine team.  Nursing records reviewed. I agree with the resident/PA/NP's note including past medical history, home medications, social history, allergies, surgical history, family history, and review of system. I have reviewed relevant vitals, laboratory values, imaging studies, and microbiology.   - Above resident's note was edited by me  - Patient's mentation improves; able to ambulate with RN/CNA  - Found to have bacteremia (GNR/GPC), likely translocation  - 2nd set of BCx sent  - ID recommends TTE to r/o endocarditis  - on IV abx; please follow ID recs  - GI on the case for liver cirrhosis.  - s/p Ascitic fluid analysis; neg for SBP  - rest of A/P as per detailed housestaff note above except above modifications    Total time spent to complete patient's bedside assessment, reviewed medical chart, discussed medical plan of care with team was 45 minutes with >50% of time spent face to face with patient, discussion with patient/family and/or coordination of care.

## 2024-11-26 NOTE — CONSULT NOTE ADULT - ASSESSMENT
ASSESSMENT   68-year-old male with a past medical history of DM, cataracts, neuropathy, and metastatic cholangiocarcinoma (follows at [facility]) presenting for altered mental status. On my exam, patient is AAOx3, he has some abdominal discomfort with mild pain around the drain he has in RUQ.    IMPRESSION  #Abdominal Pain/AMS   #Metastatic Cholangiocarcionma  s/p CBD and hepatic duct stenting s/p cholecystoduodenostomy s/p peritoneal drain     #E coli and VRE faecium bacteremia  - Blood Cx 11/24+   - CT Abdomen and Pelvis w/ IV Cont (11.24.24 @ 14:43) > Large abdominal ascites occupying the entire peritoneum. Questionable  reactive changes of the small bowel due to ascites. No bowel obstruction. Drain from the level of the gallbladder to the duodenum with associated metal artifact obscuring evaluation. Biliary duct stents are also noted. Questionable scrotal wall edema. Correlate with physical exam. Mildly nodular liver may be seen with cirrhosis and associated varices as  described.  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    #Abx allergy: No Known Allergies    RECOMMENDATIONS  - possible GI translocation -- LFTs appear stable - peritoneal fluid not consistent with SBP -- but will follow-up Cx to see if any growth; at this point no clear indication for drain removal   - no gross signs of infection of port   - continue ceftriaxone 2g daily  - conitnue flagyl 500 mg q 12 hours -- change to PO when able   - continue Daptomycin 500 mg q 48 hours   -- check CK for baseline and weekly   - check TTE   - repeat blood cx for surveillance   - poor prognosis overall     Please call or message on Microsoft Teams if with any questions.  Spectra [phone]

## 2024-11-26 NOTE — PHARMACOTHERAPY INTERVENTION NOTE - COMMENTS
Recommended switching metronidazole from IV to PO since patient is on a regular diet and tolerating other oral medications.

## 2024-11-26 NOTE — CHART NOTE - NSCHARTNOTEFT_GEN_A_CORE
Pt states her feels good., no pastoral issues came up. I was a supportive presence. I will follow up for support.

## 2024-11-26 NOTE — CONSULT NOTE ADULT - SUBJECTIVE AND OBJECTIVE BOX
MARIAM IVORY  68y, Male  Allergy: No Known Allergies      CHIEF COMPLAINT: Altered mental status (26 Nov 2024 08:55)      LOS  2d    HPI:  Patient is a 68-year-old male with a past medical history of DM, cataracts, neuropathy, and metastatic cholangiocarcinoma (follows at [facility]) presenting for altered mental status. On my exam, patient is AAOx3, he has some abdominal discomfort with mild pain around the drain he has in RUQ.    Called daughter Jazlyn Ivory (physician, [phone]) who reports that patient has had short periods of confusion over the last few months, usually caused by infections (cholangitis, SBP...). This morning, patient woke up and was more lethargic than usual, with delayed responses when spoken to. He has been around sick contacts so they decided to bring him in to r/o infection as cause of lethargy. As per daughter, patient has a peritoneal drain, he drains himself 1L of peritoneal fluid daily. Last MR head to check for mets was done 2 months ago and was negative.   No fever/chills, no vomiting, no urinary sx or diarrhea at home.     Vitals: /72, HR 97, T 97, RR 17, SpO2 98% on RA  Labs: WBC 8, Hgb 8, Na 132, Crea 1.8, alk phos 258, lactate 3.9, ammonia 71,   UA negative  RVP negative  Imaging:  - CXR (11.24.24 @ 13:25): Low lung volumes without focal consolidation or pneumothorax.  - CTAP w/ IV Cont (11.24.24 @ 14:43): Large abdominal ascites occupying the entire peritoneum. Questionable reactive changes of the small bowel due to ascites. No bowel obstruction. Drain from the level of the gallbladder to the duodenum with associated metal artifact obscuring evaluation. Biliary duct stents are also noted. Questionable scrotal wall edema. Correlate with physical exam. Mildly nodular liver may be seen with cirrhosis and associated varices as described.  - CT Head No Cont (11.24.24 @ 14:43): Allowing for motion artifact, unremarkable study.    In the ED, patient received Ciprofloxacin 400mg, Flagyl 500mg, LR 1L bolus and was started on lactulose.    (24 Nov 2024 20:15)      INFECTIOUS DISEASE HISTORY:  History as above.  Presents for abdominal pain and AMS.   CT abd/pelvis showing large abdominal ascities.   Has peritoneal drain.   Blood Cx growing E coli and VRE faecium.   1L of peritoneal fluid removed yesterday.     PAST MEDICAL & SURGICAL HISTORY:  History of medical problems  dm type 2, bile duct cancer on chemo, cataracts, gerd, neuropathy (crawling sensation)      H/O inguinal hernia repair  right groin          FAMILY HISTORY  Family history reviewed and non-contributory      SOCIAL HISTORY  Social History:  Denies etoh use, drug use       ROS  General: Denies rigors, nightsweats  HEENT: Denies headache, rhinorrhea, sore throat, eye pain  CV: Denies CP, palpitations  PULM: Denies wheezing, hemoptysis  GI: Denies hematemesis, hematochezia, melena  : Denies discharge, hematuria  MSK: Denies arthralgias, myalgias  SKIN: Denies rash, lesions  NEURO: Denies paresthesias, weakness  PSYCH: Denies depression, anxiety    VITALS:  T(F): 98.4, Max: 98.5 (11-25-24 @ 19:34)  HR: 101  BP: 124/76  RR: 18Vital Signs Last 24 Hrs  T(C): 36.9 (26 Nov 2024 05:38), Max: 36.9 (25 Nov 2024 19:34)  T(F): 98.4 (26 Nov 2024 05:38), Max: 98.5 (25 Nov 2024 19:34)  HR: 101 (26 Nov 2024 05:38) (71 - 101)  BP: 124/76 (26 Nov 2024 05:38) (115/71 - 133/63)  BP(mean): --  RR: 18 (26 Nov 2024 05:38) (18 - 18)  SpO2: 100% (25 Nov 2024 19:34) (100% - 100%)    Parameters below as of 25 Nov 2024 13:30  Patient On (Oxygen Delivery Method): room air        PHYSICAL EXAM:  Gen: NAD, resting in bed  HEENT: Normocephalic, atraumatic  Neck: supple, no lymphadenopathy  CV: Regular rate & regular rhythm  Lungs: decreased BS at bases, no fremitus  Abdomen: tender to palpation - mildly distended  Ext: Warm, well perfused  Neuro: non focal, awake  Skin: no rash, no erythema  Lines: no phlebitis    TESTS & MEASUREMENTS:                        8.3    7.66  )-----------( 241      ( 25 Nov 2024 06:15 )             25.1     11-25    137  |  108  |  31[H]  ----------------------------<  152[H]  4.5   |  20  |  1.7[H]    Ca    7.8[L]      25 Nov 2024 06:15  Mg     2.1     11-25    TPro  7.2  /  Alb  2.0[L]  /  TBili  0.7  /  DBili  x   /  AST  33  /  ALT  15  /  AlkPhos  250[H]  11-25      LIVER FUNCTIONS - ( 25 Nov 2024 06:15 )  Alb: 2.0 g/dL / Pro: 7.2 g/dL / ALK PHOS: 250 U/L / ALT: 15 U/L / AST: 33 U/L / GGT: x           Urinalysis Basic - ( 25 Nov 2024 10:18 )    Color: Yellow / Appearance: Clear / SG: >1.030 / pH: x  Gluc: x / Ketone: Negative mg/dL  / Bili: Negative / Urobili: 0.2 mg/dL   Blood: x / Protein: 30 mg/dL / Nitrite: Negative   Leuk Esterase: Negative / RBC: 3 /HPF / WBC 8 /HPF   Sq Epi: x / Non Sq Epi: 0 /HPF / Bacteria: Negative /HPF        Culture - Fungal, Body Fluid (collected 11-25-24 @ 14:52)  Source: Peritoneal  Preliminary Report (11-26-24 @ 07:49):    Testing in progress    Culture - Body Fluid with Gram Stain (collected 11-25-24 @ 14:52)  Source: Ascites Fl  Gram Stain (11-26-24 @ 03:49):    No polymorphonuclear leukocytes seen    No organisms seen    by cytocentrifuge    Culture - Blood (collected 11-24-24 @ 15:53)  Source: .Blood BLOOD  Gram Stain (11-25-24 @ 11:37):    Growth in anaerobic bottle: Gram Negative Rods  Preliminary Report (11-25-24 @ 11:37):    Growth in anaerobic bottle: Gram Negative Rods    Direct identification is available within approximately 3-5    hours either by Blood Panel Multiplexed PCR or Direct    MALDI-TOF. Details: [email or web address]  Organism: Blood Culture PCR (11-25-24 @ 12:53)  Organism: Blood Culture PCR (11-25-24 @ 12:53)      Method Type: PCR      -  Escherichia coli: Detec    Culture - Blood (collected 11-24-24 @ 15:34)  Source: .Blood BLOOD  Gram Stain (11-25-24 @ 15:28):    Growth in aerobic bottle: Gram positive cocci in pairs    Growth in anaerobic bottle: Gram positive cocci in pairs  Preliminary Report (11-25-24 @ 15:28):    Growth in aerobic bottle: Gram positive cocci in pairs    Growth in anaerobic bottle: Gram positive cocci in pairs    Direct identification is available within approximately 3-5    hours either by Blood Panel Multiplexed PCR or Direct    MALDI-TOF. Details: [email or web address]  Organism: Blood Culture PCR (11-25-24 @ 16:17)  Organism: Blood Culture PCR (11-25-24 @ 16:17)      Method Type: PCR      -  Enterococcus faecium,VRE: Detec    Urinalysis with Rflx Culture (collected 11-24-24 @ 14:37)    Urinalysis with Rflx Culture (collected 06-18-24 @ 04:56)    Culture - Blood (collected 06-18-24 @ 00:50)  Source: .Blood Blood-Peripheral  Final Report (06-23-24 @ 09:00):    No growth at 5 days    Culture - Blood (collected 06-18-24 @ 00:50)  Source: .Blood Port Device  Gram Stain (06-18-24 @ 18:13):    Growth in anaerobic bottle: Gram Negative Rods  Final Report (06-20-24 @ 08:52):    Growth in anaerobic bottle: Escherichia coli    Direct identification is available within approximately 3-5    hours either by Blood Panel Multiplexed PCR or Direct    MALDI-TOF. Details: [email or web address]  Organism: Blood Culture PCR  Escherichia coli (06-20-24 @ 08:52)  Organism: Escherichia coli (06-20-24 @ 08:52)      Method Type: BRONWYN      -  Ampicillin: S <=8 These ampicillin results predict results for amoxicillin      -  Ampicillin/Sulbactam: S <=4/2      -  Aztreonam: S <=4      -  Cefazolin: S <=2      -  Cefepime: S <=2      -  Cefoxitin: S <=8      -  Ceftriaxone: S <=1      -  Ciprofloxacin: S <=0.25      -  Ertapenem: S <=0.5      -  Gentamicin: S <=2      -  Imipenem: S <=1      -  Levofloxacin: S <=0.5      -  Meropenem: S <=1      -  Piperacillin/Tazobactam: S <=8      -  Tobramycin: S <=2      -  Trimethoprim/Sulfamethoxazole: S <=0.5/9.5  Organism: Blood Culture PCR (06-20-24 @ 08:52)      Method Type: PCR      -  Escherichia coli: Detec        Lactate, Blood: 3.4 mmol/L (11-25-24 @ 01:54)  Lactate, Blood: 3.9 mmol/L (11-24-24 @ 15:42)      INFECTIOUS DISEASES TESTING  Procalcitonin: 0.96 ng/mL (11-25-24 @ 06:15)      RADIOLOGY & ADDITIONAL TESTS:  I have personally reviewed the last Chest xray  CXR  Xray Chest 1 View- PORTABLE-Urgent:   ACC: [id number] EXAM:  XR CHEST PORTABLE URGENT 1V   ORDERED BY: RA WING     PROCEDURE DATE:  11/24/2024          INTERPRETATION:  CLINICAL HISTORY: Sepsis.    COMPARISON: Chest radiograph 6/18/2024.    TECHNIQUE: Portable frontal chest radiograph, near lordotic positioning.    FINDINGS:    Support devices: Right chest Mediport.    Cardiac/mediastinum/hilum: Exaggerated by low lung volumes, positioning.    Lung parenchyma/Pleura: Low lung volumes. No focal consolidation or   pneumothorax.    Skeleton/soft tissues: Stable postsurgical material and biliary stent   overlying the right hemiabdomen.      IMPRESSION:    Low lung volumes without focal consolidation or pneumothorax.    --- End of Report ---            OSMAR ALBERTS; Attending Radiologist  This document has been electronically signed. Nov 24 2024  1:59PM (11-24-24 @ 13:25)      CT  CT Abdomen and Pelvis w/ IV Cont:   ACC: [id number] EXAM:  CT ABDOMEN AND PELVIS IC   ORDERED BY: RA WING     PROCEDURE DATE:  11/24/2024          INTERPRETATION:  Clinical Indication: Biliary duct cancer.    Technique: Multidetector-row CT images of the abdomen and pelvis were   obtained from the xiphoid through the symphysis pubis following the   administration of intravenous contrast. No oral contrast was   administered.  Coronal and sagittal reconstructions were performed.    Contrast: 95 cc Omnipaque 350 non-ionic intravenous contrast.    Comparison: CT abdomen and pelvis 6/18/2024.    Findings:    01. LIVER: Mildly nodular liver. No visualized lesion.  02. SPLEEN: Normal.  03. PANCREAS: Normal.  04. GALLBLADDER/BILIARY TREE: Drain from the level of the gallbladder to   the duodenum with associated metal artifact obscuring evaluation. Biliary   duct stents are noted. Pneumobilia is also noted.  05. ADRENALS: Normal.  06. KIDNEYS: Symmetric enhancement.  No hydronephrosis or renal stone.   Right renal cysts.  07. LYMPHADENOPATHY/RETROPERITONEUM: No enlarged lymph nodes.  08. BOWEL: No bowel obstruction. Questionable reactive small bowel due to   large ascites. Right abdominal drain. Small hiatal hernia.  09. PELVIC VISCERA: Prostate measures 4.1 cm in transverse dimension   (series 3 image 148). Under distended urinary bladder. Questionable   scrotal wall edema.  10. PELVIC LYMPH NODES: No enlarged lymph nodes.  11. VASCULATURE: No abdominal aortic aneurysm. Atherosclerotic disease is   the aorta and its branches. Perigastric, paraesophageal and right   abdominal varices. Likely perisplenic varices.  12. PERITONEUM/ABDOMINAL WALL: Large ascites occupying the entire   peritoneum.  13. SKELETAL: Degenerative changes.  14. LUNG BASES: Areas of small atelectasis.    IMPRESSION:    Large abdominal ascites occupying the entire peritoneum. Questionable   reactive changes of the small bowel due to ascites. No bowel obstruction.    Drain from the level of the gallbladder to the duodenum with associated  metal artifact obscuring evaluation. Biliary duct stents are also noted.    Questionable scrotal wall edema. Correlate with physical exam.    Mildly nodular liver may be seen with cirrhosis and associated varices as   described.          --- End ofReport ---            CHELO CARLSON MD; Attending Radiologist  This document has been electronically signed. Nov 24 2024  5:05PM (11-24-24 @ 14:43)      CARDIOLOGY TESTING  12 Lead ECG:   Ventricular Rate 97 BPM    Atrial Rate 97 BPM    P-R Interval 134 ms    QRS Duration 70 ms    Q-T Interval 350 ms    QTC Calculation(Bazett) 444 ms    P Axis 57 degrees    R Axis -2 degrees    T Axis -26 degrees    Diagnosis Line Normal sinus rhythm  Left ventricular hypertrophy  T wave abnormality, consider inferior ischemia  Abnormal ECG  Confirmed by Renato Melissa ([id number]) on 11/24/2024 4:45:10 PM (11-24-24 @ 14:47)      MEDICATIONS  cefTRIAXone   IVPB 2000 IV Intermittent every 24 hours  DAPTOmycin IVPB 500 IV Intermittent every 48 hours  dextrose 50% Injectable 25 IV Push once  dextrose 50% Injectable 12.5 IV Push once  dextrose 50% Injectable 25 IV Push once  dorzolamide 2% Ophthalmic Solution 1 Right EYE every 8 hours  glucagon  Injectable 1 IntraMuscular once  heparin   Injectable 5000 SubCutaneous every 12 hours  influenza  Vaccine (HIGH DOSE) 0.5 IntraMuscular once  insulin lispro (ADMELOG) corrective regimen sliding scale  SubCutaneous three times a day before meals  lactulose Syrup 20 Oral four times a day  megestrol 20 Oral daily  metroNIDAZOLE  IVPB 500 IV Intermittent every 12 hours  pantoprazole    Tablet 40 Oral before breakfast  polyethylene glycol 3350 17 Oral daily  prednisoLONE acetate 1% Suspension 1 Right EYE two times a day  timolol 0.25% Solution 1 Right EYE two times a day      Weight  Weight (kg): 49.5 (11-25-24 @ 01:02)    ANTIBIOTICS:  cefTRIAXone   IVPB 2000 milliGRAM(s) IV Intermittent every 24 hours  DAPTOmycin IVPB 500 milliGRAM(s) IV Intermittent every 48 hours  metroNIDAZOLE  IVPB 500 milliGRAM(s) IV Intermittent every 12 hours      ALLERGIES:  No Known Allergies

## 2024-11-26 NOTE — PROGRESS NOTE ADULT - SUBJECTIVE AND OBJECTIVE BOX
SUBJECTIVE/OVERNIGHT EVENTS  Today is hospital day 2d. This morning patient was seen and examined at bedside, resting comfortably in bed. No acute or major events overnight.    MEDICATIONS  STANDING MEDICATIONS  cefTRIAXone   IVPB 2000 milliGRAM(s) IV Intermittent every 24 hours  DAPTOmycin IVPB 500 milliGRAM(s) IV Intermittent every 48 hours  dextrose 50% Injectable 25 Gram(s) IV Push once  dextrose 50% Injectable 12.5 Gram(s) IV Push once  dextrose 50% Injectable 25 Gram(s) IV Push once  dorzolamide 2% Ophthalmic Solution 1 Drop(s) Right EYE every 8 hours  glucagon  Injectable 1 milliGRAM(s) IntraMuscular once  heparin   Injectable 5000 Unit(s) SubCutaneous every 12 hours  influenza  Vaccine (HIGH DOSE) 0.5 milliLiter(s) IntraMuscular once  insulin lispro (ADMELOG) corrective regimen sliding scale   SubCutaneous three times a day before meals  lactulose Syrup 20 Gram(s) Oral four times a day  megestrol 20 milliGRAM(s) Oral daily  metroNIDAZOLE  IVPB 500 milliGRAM(s) IV Intermittent every 12 hours  pantoprazole    Tablet 40 milliGRAM(s) Oral before breakfast  polyethylene glycol 3350 17 Gram(s) Oral daily  prednisoLONE acetate 1% Suspension 1 Drop(s) Right EYE two times a day  timolol 0.25% Solution 1 Drop(s) Right EYE two times a day    PRN MEDICATIONS  dextrose Oral Gel 15 Gram(s) Oral once PRN  melatonin 3 milliGRAM(s) Oral at bedtime PRN    VITALS  T(F): 98.4 (11-26-24 @ 05:38), Max: 98.5 (11-25-24 @ 19:34)  HR: 101 (11-26-24 @ 05:38) (71 - 101)  BP: 124/76 (11-26-24 @ 05:38) (115/71 - 133/63)  RR: 18 (11-26-24 @ 05:38) (18 - 18)  SpO2: 100% (11-25-24 @ 19:34) (100% - 100%)  POCT Blood Glucose.: 137 mg/dL (11-26-24 @ 07:40)  POCT Blood Glucose.: 138 mg/dL (11-25-24 @ 16:42)  POCT Blood Glucose.: 163 mg/dL (11-25-24 @ 11:49)    PHYSICAL EXAM    GENERAL: NAD, lying in bed comfortably  HEAD:  Atraumatic, normocephalic  EYES: EOMI, PERRLA,   HEART: Regular rate and rhythm, no murmurs, rubs, or gallops  LUNGS: Unlabored respirations.  Clear to auscultation bilaterally, no crackles, wheezing, or rhonchi  ABDOMEN: Soft, nontender distended  EXTREMITIES: No clubbing, cyanosis, or edema. Trembliing with no asterixis   NERVOUS SYSTEM:  ranges from AOx1 to AOx3; improved now more aox3  SKIN: No rashes or lesions      LABS             8.3    7.66  )-----------( 241      ( 11-25-24 @ 06:15 )             25.1     137  |  108  |  31  -------------------------<  152   11-25-24 @ 06:15  4.5  |  20  |  1.7    Ca      7.8     11-25-24 @ 06:15  Mg     2.1     11-25-24 @ 06:15    TPro  7.2  /  Alb  2.0  /  TBili  0.7  /  DBili  x   /  AST  33  /  ALT  15  /  AlkPhos  250  /  GGT  x     11-25-24 @ 06:15    PT/INR - ( 11-24-24 @ 15:42 )   PT: 13.60 sec[H];   INR: 1.15 ratio  PTT - ( 11-24-24 @ 15:42 )  PTT:34.6 sec      Urinalysis Basic - ( 25 Nov 2024 10:18 )    Color: Yellow / Appearance: Clear / SG: >1.030 / pH: x  Gluc: x / Ketone: Negative mg/dL  / Bili: Negative / Urobili: 0.2 mg/dL   Blood: x / Protein: 30 mg/dL / Nitrite: Negative   Leuk Esterase: Negative / RBC: 3 /HPF / WBC 8 /HPF   Sq Epi: x / Non Sq Epi: 0 /HPF / Bacteria: Negative /HPF          Culture - Fungal, Body Fluid (collected 25 Nov 2024 14:52)  Source: Peritoneal  Preliminary Report (26 Nov 2024 07:49):    Testing in progress    Culture - Body Fluid with Gram Stain (collected 25 Nov 2024 14:52)  Source: Ascites Fl  Gram Stain (26 Nov 2024 03:49):    No polymorphonuclear leukocytes seen    No organisms seen    by cytocentrifuge    Culture - Blood (collected 24 Nov 2024 15:53)  Source: .Blood BLOOD  Gram Stain (25 Nov 2024 11:37):    Growth in anaerobic bottle: Gram Negative Rods  Preliminary Report (25 Nov 2024 11:37):    Growth in anaerobic bottle: Gram Negative Rods    Direct identification is available within approximately 3-5    hours either by Blood Panel Multiplexed PCR or Direct    MALDI-TOF. Details: [email or web address]  Organism: Blood Culture PCR (25 Nov 2024 12:53)  Organism: Blood Culture PCR (25 Nov 2024 12:53)    Culture - Blood (collected 24 Nov 2024 15:34)  Source: .Blood BLOOD  Gram Stain (25 Nov 2024 15:28):    Growth in aerobic bottle: Gram positive cocci in pairs    Growth in anaerobic bottle: Gram positive cocci in pairs  Preliminary Report (25 Nov 2024 15:28):    Growth in aerobic bottle: Gram positive cocci in pairs    Growth in anaerobic bottle: Gram positive cocci in pairs    Direct identification is available within approximately 3-5    hours either by Blood Panel Multiplexed PCR or Direct    MALDI-TOF. Details: [email or web address]  Organism: Blood Culture PCR (25 Nov 2024 16:17)  Organism: Blood Culture PCR (25 Nov 2024 16:17)    Urinalysis with Rflx Culture (collected 24 Nov 2024 14:37)      IMAGING

## 2024-11-26 NOTE — PROGRESS NOTE ADULT - SUBJECTIVE AND OBJECTIVE BOX
Gastroenterology Progress Note      Location: [address] ([address])  Patient Name: MARIAM IVORY  Age: 68y  Gender: Male      Chief Complaint  Patient is a 68y old Male who presents with a chief complaint of Altered mental status (25 Nov 2024 11:26)  Primary diagnosis of Disorientation      Reason for Consult  Cholangiocarcinoma      Progress Note  Patient reports mild abdominal discomfort s/p tap.  He denies nausea or vomiting.  He had 3 BMs on 11/25 on lactulose per RN.  No melena or hematochezia.      Vital Signs in the last 24 hours   Vitals Summary T(C): 36.7 (11-25-24 @ 05:30), Max: 36.7 (11-25-24 @ 05:30)  HR: 99 (11-25-24 @ 05:30) (68 - 99)  BP: 100/65 (11-25-24 @ 05:30) (100/65 - 128/81)  RR: 18 (11-25-24 @ 05:30) (17 - 18)  SpO2: 98% (11-25-24 @ 05:30) (98% - 100%)  Vent Data   Intake/ Output   Measurements Height (cm): 167.6 (11-25-24 @ 08:36)  Weight (kg): 49.5 (11-25-24 @ 01:02)  BMI (kg/m2): 17.6 (11-25-24 @ 08:36)  BSA (m2): 1.55 (11-25-24 @ 08:36)      Physical Exam  * General Appearance: Alert, interactive but slow, oriented to time, place, and person, in no acute distress  * Eyes: PERRL, conjunctiva/corneas clear, EOM's intact, fundi benign, both eyes  * Lungs: Good bilateral air entry, normal breath sounds   * Heart: Regular Rate and Rhythm, normal S1 and S2, no audible murmur, rub, or gallop  * Abdomen: Symmetric, softly distended, non-tender, bowel sounds active all four quadrants  * Extremities: lower extremity pitting edema bilaterally      Investigations                          8.3    7.66  )-----------( 241      ( 25 Nov 2024 06:15 )             25.1     11-25    137  |  108  |  31[H]  ----------------------------<  152[H]  4.5   |  20  |  1.7[H]    Ca    7.8[L]      25 Nov 2024 06:15  Mg     2.1     11-25    TPro  7.2  /  Alb  2.0[L]  /  TBili  0.7  /  DBili  x   /  AST  33  /  ALT  15  /  AlkPhos  250[H]  11-25        LIVER FUNCTIONS - ( 25 Nov 2024 06:15 )  Alb: 2.0 g/dL / Pro: 7.2 g/dL / ALK PHOS: 250 U/L / ALT: 15 U/L / AST: 33 U/L / GGT: x           PT/INR - ( 24 Nov 2024 15:42 )   PT: 13.60 sec;   INR: 1.15 ratio         PTT - ( 24 Nov 2024 15:42 )  PTT:34.6 sec    Urinalysis Basic - ( 25 Nov 2024 10:18 )    Color: Yellow / Appearance: Clear / SG: >1.030 / pH: x  Gluc: x / Ketone: Negative mg/dL  / Bili: Negative / Urobili: 0.2 mg/dL   Blood: x / Protein: 30 mg/dL / Nitrite: Negative   Leuk Esterase: Negative / RBC: 3 /HPF / WBC 8 /HPF   Sq Epi: x / Non Sq Epi: 0 /HPF / Bacteria: Negative /HPF      Culture Results:   Testing in progress (11-25 @ 14:52)       Radiological Workup  CT Abdomen and Pelvis w/ IV Cont:   ACC: [id number] EXAM:  CT ABDOMEN AND PELVIS IC   ORDERED BY: RA WING     PROCEDURE DATE:  11/24/2024          INTERPRETATION:  Clinical Indication: Biliary duct cancer.    Technique: Multidetector-row CT images of the abdomen and pelvis were   obtained from the xiphoid through the symphysis pubis following the   administration of intravenous contrast. No oral contrast was   administered.  Coronal and sagittal reconstructions were performed.    Contrast: 95 cc Omnipaque 350 non-ionic intravenous contrast.    Comparison: CT abdomen and pelvis 6/18/2024.    Findings:    01. LIVER: Mildly nodular liver. No visualized lesion.  02. SPLEEN: Normal.  03. PANCREAS: Normal.  04. GALLBLADDER/BILIARY TREE: Drain from the level of the gallbladder to   the duodenum with associated metal artifact obscuring evaluation. Biliary   duct stents are noted. Pneumobilia is also noted.  05. ADRENALS: Normal.  06. KIDNEYS: Symmetric enhancement.  No hydronephrosis or renal stone.   Right renal cysts.  07. LYMPHADENOPATHY/RETROPERITONEUM: No enlarged lymph nodes.  08. BOWEL: No bowel obstruction. Questionable reactive small bowel due to   large ascites. Right abdominal drain. Small hiatal hernia.  09. PELVIC VISCERA: Prostate measures 4.1 cm in transverse dimension   (series 3 image 148). Under distended urinary bladder. Questionable   scrotal wall edema.  10. PELVIC LYMPH NODES: No enlarged lymph nodes.  11. VASCULATURE: No abdominal aortic aneurysm. Atherosclerotic disease is   the aorta and its branches. Perigastric, paraesophageal and right   abdominal varices. Likely perisplenic varices.  12. PERITONEUM/ABDOMINAL WALL: Large ascites occupying the entire   peritoneum.  13. SKELETAL: Degenerative changes.  14. LUNG BASES: Areas of small atelectasis.    IMPRESSION:    Large abdominal ascites occupying the entire peritoneum. Questionable   reactive changes of the small bowel due to ascites. No bowel obstruction.    Drain from the level of the gallbladder to the duodenum with associated  metal artifact obscuring evaluation. Biliary duct stents are also noted.    Questionable scrotal wall edema. Correlate with physical exam.    Mildly nodular liver may be seen with cirrhosis and associated varices as   described.          --- End ofReport ---            CHELO CARLSON MD; Attending Radiologist  This document has been electronically signed. Nov 24 2024  5:05PM (11-24-24 @ 14:43)            Current Medications  Standing Medications  cefTRIAXone   IVPB 1000 milliGRAM(s) IV Intermittent every 24 hours  dextrose 5%. 1000 milliLiter(s) (50 mL/Hr) IV Continuous <Continuous>  dextrose 5%. 1000 milliLiter(s) (100 mL/Hr) IV Continuous <Continuous>  dextrose 50% Injectable 25 Gram(s) IV Push once  dextrose 50% Injectable 12.5 Gram(s) IV Push once  dextrose 50% Injectable 25 Gram(s) IV Push once  dorzolamide 2% Ophthalmic Solution 1 Drop(s) Right EYE every 8 hours  glucagon  Injectable 1 milliGRAM(s) IntraMuscular once  heparin   Injectable 5000 Unit(s) SubCutaneous every 12 hours  influenza  Vaccine (HIGH DOSE) 0.5 milliLiter(s) IntraMuscular once  insulin lispro (ADMELOG) corrective regimen sliding scale   SubCutaneous three times a day before meals  lactulose Syrup 20 Gram(s) Oral four times a day  megestrol 20 milliGRAM(s) Oral daily  metroNIDAZOLE  IVPB 500 milliGRAM(s) IV Intermittent every 12 hours  pantoprazole    Tablet 40 milliGRAM(s) Oral before breakfast  polyethylene glycol 3350 17 Gram(s) Oral daily  prednisoLONE acetate 1% Suspension 1 Drop(s) Right EYE two times a day  timolol 0.25% Solution 1 Drop(s) Right EYE two times a day  vancomycin  IVPB 750 milliGRAM(s) IV Intermittent every 24 hours    PRN Medications  dextrose Oral Gel 15 Gram(s) Oral once PRN Blood Glucose LESS THAN 70 milliGRAM(s)/deciliter  melatonin 3 milliGRAM(s) Oral at bedtime PRN Insomnia    Singles Doses Administered  (ADM OVERRIDE) 1 each &lt;see task&gt; GiveOnce  ciprofloxacin   IVPB 400 milliGRAM(s) IV Intermittent once  lactated ringers Bolus 1000 milliLiter(s) IV Bolus once       Gastroenterology Progress Note      Location: [address] ([address])  Patient Name: MARIAM IVORY  Age: 68y  Gender: Male      Chief Complaint  Patient is a 68y old Male who presents with a chief complaint of Altered mental status (25 Nov 2024 11:26)  Primary diagnosis of Disorientation      Reason for Consult  Cholangiocarcinoma      Progress Note  Patient reports mild abdominal discomfort s/p tap.  He denies nausea or vomiting.  He had 3 BMs on 11/25 on lactulose per RN.  No melena or hematochezia.      Vital Signs in the last 24 hours   Vitals Summary T(C): 36.7 (11-25-24 @ 05:30), Max: 36.7 (11-25-24 @ 05:30)  HR: 99 (11-25-24 @ 05:30) (68 - 99)  BP: 100/65 (11-25-24 @ 05:30) (100/65 - 128/81)  RR: 18 (11-25-24 @ 05:30) (17 - 18)  SpO2: 98% (11-25-24 @ 05:30) (98% - 100%)  Vent Data   Intake/ Output   Measurements Height (cm): 167.6 (11-25-24 @ 08:36)  Weight (kg): 49.5 (11-25-24 @ 01:02)  BMI (kg/m2): 17.6 (11-25-24 @ 08:36)  BSA (m2): 1.55 (11-25-24 @ 08:36)      Physical Exam  * General Appearance: Alert, interactive but slow, oriented to time, place, and person, in no acute distress  * Eyes: PERRL, conjunctiva/corneas clear, EOM's intact, fundi benign, both eyes  * Lungs: Good bilateral air entry, normal breath sounds   * Heart: Regular Rate and Rhythm, normal S1 and S2, no audible murmur, rub, or gallop  * Abdomen: Symmetric, softly distended, non-tender, bowel sounds active all four quadrants  * Extremities: lower extremity pitting edema bilaterally      Investigations                          8.3    7.66  )-----------( 241      ( 25 Nov 2024 06:15 )             25.1     11-25    137  |  108  |  31[H]  ----------------------------<  152[H]  4.5   |  20  |  1.7[H]    Ca    7.8[L]      25 Nov 2024 06:15  Mg     2.1     11-25    TPro  7.2  /  Alb  2.0[L]  /  TBili  0.7  /  DBili  x   /  AST  33  /  ALT  15  /  AlkPhos  250[H]  11-25        LIVER FUNCTIONS - ( 25 Nov 2024 06:15 )  Alb: 2.0 g/dL / Pro: 7.2 g/dL / ALK PHOS: 250 U/L / ALT: 15 U/L / AST: 33 U/L / GGT: x           PT/INR - ( 24 Nov 2024 15:42 )   PT: 13.60 sec;   INR: 1.15 ratio         PTT - ( 24 Nov 2024 15:42 )  PTT:34.6 sec    Urinalysis Basic - ( 25 Nov 2024 10:18 )    Color: Yellow / Appearance: Clear / SG: >1.030 / pH: x  Gluc: x / Ketone: Negative mg/dL  / Bili: Negative / Urobili: 0.2 mg/dL   Blood: x / Protein: 30 mg/dL / Nitrite: Negative   Leuk Esterase: Negative / RBC: 3 /HPF / WBC 8 /HPF   Sq Epi: x / Non Sq Epi: 0 /HPF / Bacteria: Negative /HPF      Culture Results:   Testing in progress (11-25 @ 14:52)       Radiological Workup  CT Abdomen and Pelvis w/ IV Cont:   ACC: [id number] EXAM:  CT ABDOMEN AND PELVIS IC   ORDERED BY: RA WING     PROCEDURE DATE:  11/24/2024          INTERPRETATION:  Clinical Indication: Biliary duct cancer.    Technique: Multidetector-row CT images of the abdomen and pelvis were   obtained from the xiphoid through the symphysis pubis following the   administration of intravenous contrast. No oral contrast was   administered.  Coronal and sagittal reconstructions were performed.    Contrast: 95 cc Omnipaque 350 non-ionic intravenous contrast.    Comparison: CT abdomen and pelvis 6/18/2024.    Findings:    01. LIVER: Mildly nodular liver. No visualized lesion.  02. SPLEEN: Normal.  03. PANCREAS: Normal.  04. GALLBLADDER/BILIARY TREE: Drain from the level of the gallbladder to   the duodenum with associated metal artifact obscuring evaluation. Biliary   duct stents are noted. Pneumobilia is also noted.  05. ADRENALS: Normal.  06. KIDNEYS: Symmetric enhancement.  No hydronephrosis or renal stone.   Right renal cysts.  07. LYMPHADENOPATHY/RETROPERITONEUM: No enlarged lymph nodes.  08. BOWEL: No bowel obstruction. Questionable reactive small bowel due to   large ascites. Right abdominal drain. Small hiatal hernia.  09. PELVIC VISCERA: Prostate measures 4.1 cm in transverse dimension   (series 3 image 148). Under distended urinary bladder. Questionable   scrotal wall edema.  10. PELVIC LYMPH NODES: No enlarged lymph nodes.  11. VASCULATURE: No abdominal aortic aneurysm. Atherosclerotic disease is   the aorta and its branches. Perigastric, paraesophageal and right   abdominal varices. Likely perisplenic varices.  12. PERITONEUM/ABDOMINAL WALL: Large ascites occupying the entire   peritoneum.  13. SKELETAL: Degenerative changes.  14. LUNG BASES: Areas of small atelectasis.    IMPRESSION:    Large abdominal ascites occupying the entire peritoneum. Questionable   reactive changes of the small bowel due to ascites. No bowel obstruction.    Drain from the level of the gallbladder to the duodenum with associated  metal artifact obscuring evaluation. Biliary duct stents are also noted.    Questionable scrotal wall edema. Correlate with physical exam.    Mildly nodular liver may be seen with cirrhosis and associated varices as   described.          --- End ofReport ---            CHELO CARLSON MD; Attending Radiologist  This document has been electronically signed. Nov 24 2024  5:05PM (11-24-24 @ 14:43)      < from: US Abdomen Upper Quadrant Right (06.18.24 @ 02:49) >  Limited visualization of the liver. No hepatic collection on provided   images. See concurrent CT for additional findings.    Gallbladder is not visualized.    < end of copied text >        Current Medications  Standing Medications  cefTRIAXone   IVPB 1000 milliGRAM(s) IV Intermittent every 24 hours  dextrose 5%. 1000 milliLiter(s) (50 mL/Hr) IV Continuous <Continuous>  dextrose 5%. 1000 milliLiter(s) (100 mL/Hr) IV Continuous <Continuous>  dextrose 50% Injectable 25 Gram(s) IV Push once  dextrose 50% Injectable 12.5 Gram(s) IV Push once  dextrose 50% Injectable 25 Gram(s) IV Push once  dorzolamide 2% Ophthalmic Solution 1 Drop(s) Right EYE every 8 hours  glucagon  Injectable 1 milliGRAM(s) IntraMuscular once  heparin   Injectable 5000 Unit(s) SubCutaneous every 12 hours  influenza  Vaccine (HIGH DOSE) 0.5 milliLiter(s) IntraMuscular once  insulin lispro (ADMELOG) corrective regimen sliding scale   SubCutaneous three times a day before meals  lactulose Syrup 20 Gram(s) Oral four times a day  megestrol 20 milliGRAM(s) Oral daily  metroNIDAZOLE  IVPB 500 milliGRAM(s) IV Intermittent every 12 hours  pantoprazole    Tablet 40 milliGRAM(s) Oral before breakfast  polyethylene glycol 3350 17 Gram(s) Oral daily  prednisoLONE acetate 1% Suspension 1 Drop(s) Right EYE two times a day  timolol 0.25% Solution 1 Drop(s) Right EYE two times a day  vancomycin  IVPB 750 milliGRAM(s) IV Intermittent every 24 hours    PRN Medications  dextrose Oral Gel 15 Gram(s) Oral once PRN Blood Glucose LESS THAN 70 milliGRAM(s)/deciliter  melatonin 3 milliGRAM(s) Oral at bedtime PRN Insomnia    Singles Doses Administered  (ADM OVERRIDE) 1 each &lt;see task&gt; GiveOnce  ciprofloxacin   IVPB 400 milliGRAM(s) IV Intermittent once  lactated ringers Bolus 1000 milliLiter(s) IV Bolus once

## 2024-11-26 NOTE — PROGRESS NOTE ADULT - ASSESSMENT
Assessment and Plan  Case of a 68 year old male patient known to have history of DM, DN, cataracts, metastatic cholangiocarcinoma with pseudocirrhosis/ascites and hx of SBP and cholangitis (s/p peritoneal drain and biliary/hepatic duct drains) who was brought to the ED on 11/24 for evaluation of lethargy and episodes of confusion, found to have stable liver enzymes and evidence of large ascites on imaging. We are consulted in setting of above findings.      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  E Coli and STERLING Faecium Bacteremia on 11/24 Likely GI Translocation  Large Ascites with No Evidence of SBP; Reported Hx of SBP; s/p Peritoneal Drain at [facility] [place]  No Evidence of Acute Cholangitis; Reported Hx of Cholangitis; s/p biliary/hepatic duct drains and cholecystoduodenostomy  at [facility] [place]  Metabolic Encephalopathy Likely in Setting of Bacteremia; Less Likely Hepatic Encephalopathy  Chronic Elevation of ALP  Perigastric, Paraesophageal, perisplenic and right abdominal wall varices on imaging  * Diagnosed with metastatic cholangiocarcinoma last year. s/p peritoneal drain; biliary/hepatic duct drain; and axios from GB to duodenum at [facility] [place]  * Vitals: 100/65mmHg, HR 99 bpm, T 36.7 degrees on 11/25 -> 124/76mmHg,  bpm, T 36.9 degrees on 11/26  * Labs: WBC 7.66, Hb 8.3, Plt 241, Na 137, K 4.5, BUN 31, Cr 1.7 on 11/25   * Liver enzymes: 0.7/250/33/15 on 11/25  * INR 1.15 on 11/24   * LA 3.9 -> 3.4 on 11/25  * Serum ammonia 71 on 11/24  * Blood culture 11/24 E Coli and STERLING Faecium -> repeat pending  * US Abdomen Upper Quadrant Right (06.18.24 @ 02:49) Liver: Partially imaged. Partially imaged stents. No hepatic collection on provided images.Bile ducts: Common bile duct visualized. Intrahepatic biliary ducts are without significant dilatation.  * CT Abdomen and Pelvis w/ IV Cont (11.24.24 @ 14:43) >large abdominal ascites occupying the entire peritoneum. Questionable reactive changes of the small bowel due to ascites. No bowel obstruction.Drain from the level of the gallbladder to the duodenum with associatedmetal artifact obscuring evaluation. Biliary duct stents are also noted. questionable scrotal wall edema. Correlate with physical exam. Mildly nodular liver may be seen with cirrhosis and associated varices as described.  * Previous colonoscopy was last year; could not recall last EGD  * No FHx of GI cancers  * S/P drainage of 1L peritoneal fluid via peritoneal drain on 11/25: fluid not suggestive of SBP; cx no growth to date    RECOMMENDATIONS  - Obtain records from [facility]  - Trend liver enzymes. Check acute hepatitis panel  - Recommend goals of care. Recommend oncology/palliative evaluation  - For ascites: recommend serial abdominal exams. S/P drainage of 1L peritoneal fluid via peritoneal drain on 11/25: fluid not suggestive of SBP; cx no growth to date  - ID evaluation appreciated. Continue IV Ab per ID (on Rocephin, Daptomycin, and Flagyl). Trend CK. Check TTE. Follow up repeat blood cultures. No indication to remove peritoneal drain if peritoneal fluid cx with NGTD monitor for fever; monitor MAP and keep > 65mmHg; follow up blood cultures   - For encephalopathy: Monitor BM and avoid constipation. Continue lactulose 20mg Q6h and miralax 17g QD  - Advance diet as tolerated  - Monitor for pain: management per team  - Monitor for nausea: consider antiemetics PRN if QTc is within normal  - Continue Protonix 40mg QD for GI Prophylaxis; avoid NSAIDs  - Trend CBC and transfuse as needed; keep active type and screen. Check anemia workup (iron studies)  - Follow up with [facility] team or at our [facility] located at [address] [place] [address]. Phone Number: [phone]        Thank you for your consult.  - Please note that plan was communicated with medical team.   - Please reach GI on [phone] during weekdays till 5pm.  - Please call the GI service line after 5pm on Weekdays and anytime on Weekends: [phone].      Cameron Nair MD  PGY - 5 Gastroenterology Fellow   [facility]     Assessment and Plan  Case of a 68 year old male patient known to have history of DM, DN, cataracts, metastatic cholangiocarcinoma with pseudocirrhosis/ascites and hx of SBP and cholangitis (s/p peritoneal drain and biliary/hepatic duct drains) who was brought to the ED on 11/24 for evaluation of lethargy and episodes of confusion, found to have stable liver enzymes and evidence of large ascites on imaging. We are consulted in setting of above findings.      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  E Coli and STERLING Faecium Bacteremia on 11/24 Likely GI Translocation  Large Ascites with No Evidence of SBP; Reported Hx of SBP; [place] [facility] [place]  No Evidence of Acute Cholangitis; Reported Hx of Cholangitis; s/p biliary/hepatic duct drains and cholecystoduodenostomy  at [facility] [place]  Metabolic Encephalopathy Likely in Setting of Bacteremia; Less Likely Hepatic Encephalopathy  Chronic Elevation of ALP  Perigastric, Paraesophageal, perisplenic and right abdominal wall varices on imaging  * Diagnosed with metastatic cholangiocarcinoma last year. s/p peritoneal drain; biliary/hepatic duct drain; and axios from GB to duodenum at [facility] [place]  * Vitals: 100/65mmHg, HR 99 bpm, T 36.7 degrees on 11/25 -> 124/76mmHg,  bpm, T 36.9 degrees on 11/26  * Labs: WBC 7.66, Hb 8.3, Plt 241, Na 137, K 4.5, BUN 31, Cr 1.7 on 11/25   * Liver enzymes: 0.7/250/33/15 on 11/25  * INR 1.15 on 11/24   * LA 3.9 -> 3.4 on 11/25  * Serum ammonia 71 on 11/24  * Blood culture 11/24 E Coli and STERLING Faecium -> repeat pending  * US Abdomen Upper Quadrant Right (06.18.24 @ 02:49) Liver: Partially imaged. Partially imaged stents. No hepatic collection on provided images.Bile ducts: Common bile duct visualized. Intrahepatic biliary ducts are without significant dilatation.  * CT Abdomen and Pelvis w/ IV Cont (11.24.24 @ 14:43) >large abdominal ascites occupying the entire peritoneum. Questionable reactive changes of the small bowel due to ascites. No bowel obstruction.Drain from the level of the gallbladder to the duodenum with associatedmetal artifact obscuring evaluation. Biliary duct stents are also noted. questionable scrotal wall edema. Correlate with physical exam. Mildly nodular liver may be seen with cirrhosis and associated varices as described.  * Previous colonoscopy was last year; could not recall last EGD  * No FHx of GI cancers  * S/P drainage of 1L peritoneal fluid via peritoneal drain on 11/25: fluid not suggestive of SBP; cx no growth to date    RECOMMENDATIONS  - Obtain records from [facility]  - Trend liver enzymes. Check acute hepatitis panel  - Recommend goals of care discussion. Recommend oncology/palliative evaluation if family is agreeable (currently refusing)  - For ascites: recommend serial abdominal exams. S/P drainage of 1L peritoneal fluid via peritoneal drain on 11/25: fluid not suggestive of SBP; cx no growth to date  - ID evaluation appreciated. Continue IV Ab per ID (on Rocephin, Daptomycin, and Flagyl). Trend CK. Check TTE. Follow up repeat blood cultures. No indication to remove peritoneal drain if peritoneal fluid cx with NGTD monitor for fever; monitor MAP and keep > 65mmHg; follow up blood cultures   - For encephalopathy: Monitor BM and avoid constipation. Continue lactulose 20mg Q6h and miralax 17g QD  - Advance diet as tolerated  - Monitor for pain: management per team  - Monitor for nausea: consider antiemetics PRN if QTc is within normal  - Continue Protonix 40mg QD for GI Prophylaxis; avoid NSAIDs  - Trend CBC and transfuse as needed; keep active type and screen. Check anemia workup (iron studies)  - Follow up with [facility] team or at our [facility] located at [address] [place] [address]. Phone Number: [phone]        Thank you for your consult.  - Please note that plan was communicated with medical team.   - Please reach GI on [phone] during weekdays till 5pm.  - Please call the GI service line after 5pm on Weekdays and anytime on Weekends: [phone].      Cameron Nair MD  PGY - 5 Gastroenterology Fellow   [facility]

## 2024-11-27 ENCOUNTER — RESULT REVIEW (OUTPATIENT)
Age: 69
End: 2024-11-27

## 2024-11-27 LAB
-  AMPICILLIN/SULBACTAM: SIGNIFICANT CHANGE UP
-  AMPICILLIN: SIGNIFICANT CHANGE UP
-  AMPICILLIN: SIGNIFICANT CHANGE UP
-  AZTREONAM: SIGNIFICANT CHANGE UP
-  CEFAZOLIN: SIGNIFICANT CHANGE UP
-  CEFEPIME: SIGNIFICANT CHANGE UP
-  CEFOXITIN: SIGNIFICANT CHANGE UP
-  CEFTRIAXONE: SIGNIFICANT CHANGE UP
-  CIPROFLOXACIN: SIGNIFICANT CHANGE UP
-  DAPTOMYCIN: SIGNIFICANT CHANGE UP
-  ERTAPENEM: SIGNIFICANT CHANGE UP
-  GENTAMICIN SYNERGY: SIGNIFICANT CHANGE UP
-  GENTAMICIN: SIGNIFICANT CHANGE UP
-  IMIPENEM: SIGNIFICANT CHANGE UP
-  LEVOFLOXACIN: SIGNIFICANT CHANGE UP
-  LINEZOLID: SIGNIFICANT CHANGE UP
-  MEROPENEM: SIGNIFICANT CHANGE UP
-  PIPERACILLIN/TAZOBACTAM: SIGNIFICANT CHANGE UP
-  STREPTOMYCIN SYNERGY: SIGNIFICANT CHANGE UP
-  TOBRAMYCIN: SIGNIFICANT CHANGE UP
-  TRIMETHOPRIM/SULFAMETHOXAZOLE: SIGNIFICANT CHANGE UP
-  VANCOMYCIN: SIGNIFICANT CHANGE UP
ALBUMIN SERPL ELPH-MCNC: 1.9 G/DL — LOW (ref 3.5–5.2)
ALP SERPL-CCNC: 234 U/L — HIGH (ref 30–115)
ALT FLD-CCNC: 15 U/L — SIGNIFICANT CHANGE UP (ref 0–41)
ANION GAP SERPL CALC-SCNC: 10 MMOL/L — SIGNIFICANT CHANGE UP (ref 7–14)
AST SERPL-CCNC: 32 U/L — SIGNIFICANT CHANGE UP (ref 0–41)
BILIRUB SERPL-MCNC: 0.6 MG/DL — SIGNIFICANT CHANGE UP (ref 0.2–1.2)
BUN SERPL-MCNC: 25 MG/DL — HIGH (ref 10–20)
CALCIUM SERPL-MCNC: 7.4 MG/DL — LOW (ref 8.4–10.5)
CHLORIDE SERPL-SCNC: 105 MMOL/L — SIGNIFICANT CHANGE UP (ref 98–110)
CK SERPL-CCNC: 48 U/L — SIGNIFICANT CHANGE UP (ref 0–225)
CO2 SERPL-SCNC: 14 MMOL/L — LOW (ref 17–32)
CREAT SERPL-MCNC: 1.7 MG/DL — HIGH (ref 0.7–1.5)
CULTURE RESULTS: ABNORMAL
EGFR: 43 ML/MIN/1.73M2 — LOW
GLUCOSE BLDC GLUCOMTR-MCNC: 127 MG/DL — HIGH (ref 70–99)
GLUCOSE BLDC GLUCOMTR-MCNC: 134 MG/DL — HIGH (ref 70–99)
GLUCOSE BLDC GLUCOMTR-MCNC: 140 MG/DL — HIGH (ref 70–99)
GLUCOSE SERPL-MCNC: 212 MG/DL — HIGH (ref 70–99)
HCT VFR BLD CALC: 24.7 % — LOW (ref 42–52)
HGB BLD-MCNC: 8.3 G/DL — LOW (ref 14–18)
MCHC RBC-ENTMCNC: 28.2 PG — SIGNIFICANT CHANGE UP (ref 27–31)
MCHC RBC-ENTMCNC: 33.6 G/DL — SIGNIFICANT CHANGE UP (ref 32–37)
MCV RBC AUTO: 84 FL — SIGNIFICANT CHANGE UP (ref 80–94)
METHOD TYPE: SIGNIFICANT CHANGE UP
METHOD TYPE: SIGNIFICANT CHANGE UP
NRBC # BLD: 0 /100 WBCS — SIGNIFICANT CHANGE UP (ref 0–0)
ORGANISM # SPEC MICROSCOPIC CNT: ABNORMAL
ORGANISM # SPEC MICROSCOPIC CNT: SIGNIFICANT CHANGE UP
ORGANISM # SPEC MICROSCOPIC CNT: SIGNIFICANT CHANGE UP
PLATELET # BLD AUTO: 212 K/UL — SIGNIFICANT CHANGE UP (ref 130–400)
PMV BLD: 10.2 FL — SIGNIFICANT CHANGE UP (ref 7.4–10.4)
POTASSIUM SERPL-MCNC: 3.3 MMOL/L — LOW (ref 3.5–5)
POTASSIUM SERPL-SCNC: 3.3 MMOL/L — LOW (ref 3.5–5)
PROT SERPL-MCNC: 6.7 G/DL — SIGNIFICANT CHANGE UP (ref 6–8)
RBC # BLD: 2.94 M/UL — LOW (ref 4.7–6.1)
RBC # FLD: 15 % — HIGH (ref 11.5–14.5)
SODIUM SERPL-SCNC: 129 MMOL/L — LOW (ref 135–146)
SPECIMEN SOURCE: SIGNIFICANT CHANGE UP
WBC # BLD: 9.84 K/UL — SIGNIFICANT CHANGE UP (ref 4.8–10.8)
WBC # FLD AUTO: 9.84 K/UL — SIGNIFICANT CHANGE UP (ref 4.8–10.8)

## 2024-11-27 PROCEDURE — 99232 SBSQ HOSP IP/OBS MODERATE 35: CPT

## 2024-11-27 PROCEDURE — 93306 TTE W/DOPPLER COMPLETE: CPT | Mod: 26

## 2024-11-27 PROCEDURE — 99233 SBSQ HOSP IP/OBS HIGH 50: CPT

## 2024-11-27 RX ORDER — POTASSIUM CHLORIDE 600 MG/1
40 TABLET, EXTENDED RELEASE ORAL ONCE
Refills: 0 | Status: COMPLETED | OUTPATIENT
Start: 2024-11-27 | End: 2024-11-27

## 2024-11-27 RX ADMIN — LACTULOSE 20 GRAM(S): 10 SOLUTION ORAL at 17:07

## 2024-11-27 RX ADMIN — Medication 1 DROP(S): at 05:25

## 2024-11-27 RX ADMIN — LACTULOSE 20 GRAM(S): 10 SOLUTION ORAL at 11:05

## 2024-11-27 RX ADMIN — PREDNISOLONE ACETATE 1 DROP(S): 1.2 SUSPENSION/ DROPS OPHTHALMIC at 17:07

## 2024-11-27 RX ADMIN — DORZOLAMIDE HYDROCHLORIDE 1 DROP(S): 20 SOLUTION OPHTHALMIC at 22:58

## 2024-11-27 RX ADMIN — DAPTOMYCIN 120 MILLIGRAM(S): 500 INJECTION, POWDER, LYOPHILIZED, FOR SOLUTION INTRAVENOUS at 22:15

## 2024-11-27 RX ADMIN — Medication 5000 UNIT(S): at 05:21

## 2024-11-27 RX ADMIN — Medication 1 DROP(S): at 17:08

## 2024-11-27 RX ADMIN — LACTULOSE 20 GRAM(S): 10 SOLUTION ORAL at 05:21

## 2024-11-27 RX ADMIN — DORZOLAMIDE HYDROCHLORIDE 1 DROP(S): 20 SOLUTION OPHTHALMIC at 13:33

## 2024-11-27 RX ADMIN — MEGESTROL ACETATE 20 MILLIGRAM(S): 40 SUSPENSION ORAL at 11:05

## 2024-11-27 RX ADMIN — PREDNISOLONE ACETATE 1 DROP(S): 1.2 SUSPENSION/ DROPS OPHTHALMIC at 05:25

## 2024-11-27 RX ADMIN — POLYETHYLENE GLYCOL 3350 17 GRAM(S): 17 POWDER, FOR SOLUTION ORAL at 11:06

## 2024-11-27 RX ADMIN — Medication 5000 UNIT(S): at 17:08

## 2024-11-27 RX ADMIN — METRONIDAZOLE 500 MILLIGRAM(S): 500 TABLET ORAL at 05:21

## 2024-11-27 RX ADMIN — POTASSIUM CHLORIDE 40 MILLIEQUIVALENT(S): 600 TABLET, EXTENDED RELEASE ORAL at 17:07

## 2024-11-27 RX ADMIN — METRONIDAZOLE 500 MILLIGRAM(S): 500 TABLET ORAL at 17:07

## 2024-11-27 RX ADMIN — ACETAMINOPHEN, DIPHENHYDRAMINE HCL, PHENYLEPHRINE HCL 3 MILLIGRAM(S): 325; 25; 5 TABLET ORAL at 22:57

## 2024-11-27 RX ADMIN — Medication 100 MILLIGRAM(S): at 13:34

## 2024-11-27 RX ADMIN — PANTOPRAZOLE SODIUM 40 MILLIGRAM(S): 40 TABLET, DELAYED RELEASE ORAL at 05:25

## 2024-11-27 RX ADMIN — DORZOLAMIDE HYDROCHLORIDE 1 DROP(S): 20 SOLUTION OPHTHALMIC at 05:23

## 2024-11-27 NOTE — DIETITIAN INITIAL EVALUATION ADULT - OTHER INFO
--68-year-old male presenting for altered mental status.   #Lethargy   #Metastatic cholangiocarcinoma s/p CBD and hepatic duct stenting s/p cholecystoduodenostomy s/p peritoneal drain (currently in place)  #Large abdominal ascites   - TTE ordered to r/o infective endocarditis per ID recs  - **If patient remains lethargic or develops confusion, consider MR Head to r/o metastatic disease. It patient drops in BP, give albumin 100cc +/- IVF***  - 1 L fluid drained yesterday and fluid studies sent. Does not meet criteria for SBP.   #VIPUL  - Creatinine on admission 1.8

## 2024-11-27 NOTE — DIETITIAN INITIAL EVALUATION ADULT - ORAL INTAKE PTA/DIET HISTORY
PTA eats 2-3 meals/day, reports good PO intake and appetite. UBW reported: 50kg vs. wt at admit: 49.5kg vs. RD bedscale: 53.3kg -- stable with UBW. NKFA. Drinks Ensure at times.

## 2024-11-27 NOTE — DIETITIAN INITIAL EVALUATION ADULT - NS FNS DIET ORDER
Diet, Soft and Bite Sized:   Consistent Carbohydrate {No Snacks} (CSTCHO) (11-24-24 @ 22:18) [Active]

## 2024-11-27 NOTE — PROGRESS NOTE ADULT - ATTENDING COMMENTS
Agree with the above. No GI intervention. c/w IV abx per ID recs. Goals of care discussion.  Very poor prognosis.  Rest of recommendations per the note above.    Time-based billing (NON-critical care).   50 minutes spent on total encounter; more than 50% of the visit was spent counseling and / or coordinating care by the attending physician.  The necessity of the time spent during the encounter on this date of service was due to: Coordination of care.

## 2024-11-27 NOTE — DIETITIAN INITIAL EVALUATION ADULT - ADD RECOMMEND
1. Add Glucerna 2x/day (440kcal/20g PRO)   2. cont w/ current diet order   3. encouraged and assist with PO intake

## 2024-11-27 NOTE — CHART NOTE - NSCHARTNOTEFT_GEN_A_CORE
Pt states he's feels better than yesterday. I was a supportive pastoral presence. I will follow up for supportive.

## 2024-11-27 NOTE — PHYSICAL THERAPY INITIAL EVALUATION ADULT - GENERAL OBSERVATIONS, REHAB EVAL
Pt encountered supine in bed, A & O x 3 in NAD, no c/o pain and agreeable to PT. Pt requires Min A in bed mobility and CGA in transfer mobility. Pt ambulated 100 ft CGA using RW with decreased veronica and negotiated 7 steps CGA using 1HR(Lt up/Rt down) step-over pattern. Pt demonstrated minimal balance instability during ambulation secondary to weakness. Pt left seated OOB in chair after PT session, spouse present at b/s, JAIME Biggs aware. Pt will benefit from skilled PT 3-5x/wk for thera ex, functional mobility training, balance activity and gait training to improve mobility status and return to previous level of function.

## 2024-11-27 NOTE — DIETITIAN INITIAL EVALUATION ADULT - PERTINENT MEDS FT
MEDICATIONS  (STANDING):  cefTRIAXone   IVPB 2000 milliGRAM(s) IV Intermittent every 24 hours  DAPTOmycin IVPB 500 milliGRAM(s) IV Intermittent every 48 hours  dextrose 50% Injectable 25 Gram(s) IV Push once  dextrose 50% Injectable 12.5 Gram(s) IV Push once  dextrose 50% Injectable 25 Gram(s) IV Push once  glucagon  Injectable 1 milliGRAM(s) IntraMuscular once  heparin   Injectable 5000 Unit(s) SubCutaneous every 12 hours  insulin lispro (ADMELOG) corrective regimen sliding scale   SubCutaneous three times a day before meals  lactulose Syrup 20 Gram(s) Oral four times a day  megestrol 20 milliGRAM(s) Oral daily  metroNIDAZOLE    Tablet 500 milliGRAM(s) Oral every 12 hours  pantoprazole    Tablet 40 milliGRAM(s) Oral before breakfast  polyethylene glycol 3350 17 Gram(s) Oral daily  prednisoLONE acetate 1% Suspension 1 Drop(s) Right EYE two times a day    MEDICATIONS  (PRN):  dextrose Oral Gel 15 Gram(s) Oral once PRN Blood Glucose LESS THAN 70 milliGRAM(s)/deciliter  melatonin 3 milliGRAM(s) Oral at bedtime PRN Insomnia

## 2024-11-27 NOTE — PROGRESS NOTE ADULT - ASSESSMENT
68-year-old male with a past medical history of DM, cataracts, neuropathy, and metastatic cholangiocarcinoma (follows at [facility]) presenting for altered mental status. daughter Jazlyn López (physician, [phone]) who reports that patient has had short periods of confusion over the last few months, usually caused by infections (cholangitis, SBP...). This morning, patient woke up and was more lethargic than usual, with delayed responses when spoken to. He has been around sick contacts so they decided to bring him in to r/o infection as cause of lethargy. As per daughter, patient has a peritoneal drain, he drains himself 1L of peritoneal fluid daily. Last MR head to check for mets was done 2 months ago and was negative.     #Lethargy   #Metastatic cholangiocarcinoma s/p CBD and hepatic duct stenting s/p cholecystoduodenostomy s/p peritoneal drain (currently in place)  #Large abdominal ascites   #Hx of hepatic abscess   * Follows at [facility] in [place] since May 2022 for cholangiocarcinoma  * Follows with Dr Sanchez (Advanced GI) and Dr. Littlejohn (oncologist) at [facility]  * Deemed not a surgical candidate because of extensive metastatic disease  * Pt has a peritoneal drain in place and drains himself 1L of peritoneal fluid daily   * Last MR Head around 2 months ago, no mets  - Vitals on arrival: /72, HR 97, T 97, RR 17, SpO2 98% on RA  - On admission WBC 8, lactate 3.9, t bili 1.1, alk phos 258, ammonia 71  - UA negative, RVP negative  - CXR (11.24.24 @ 13:25): Low lung volumes without focal consolidation or pneumothorax.  - CTAP w/ IV Cont (11.24.24 @ 14:43): Large abdominal ascites occupying the entire peritoneum. Questionable reactive changes of the small bowel due to ascites. No bowel obstruction. Drain from the level of the gallbladder to the duodenum with associated metal artifact obscuring evaluation. Biliary duct stents are also noted. Questionable scrotal wall edema. Correlate with physical exam. Mildly nodular liver may be seen with cirrhosis and associated varices as described.  - CT Head No Cont (11.24.24 @ 14:43): Allowing for motion artifact, unremarkable study.  - s/p Ciprofloxacin 400mg, Flagyl 500mg, LR 1L bolus in the ED  - Blood culture positive for VRE and E coli  bugs.   - Continue ceftriaxone, dapto, and flagyl,  will fu ID   - Lactulose 20g q6 for 2 days  - Monitor BMs  - Palliative care consult appreciated   - **If patient remains lethargic or develops confusion, consider MR Head to r/o metastatic disease. It patient drops in BP, give albumin 100cc +/- IVF***  - 1 L fluid drained fluid studies sent. Does not meet criteria for SBP.   - CK ordered for baseline and trend weekly while on Dapto   - TTE ordered to r/o infective endocarditis per ID recs  - Follow up repeat lactate, procalcitonin , and repeat Bcx     #VIPUL  - Creatinine on admission 1.8  - Creatinine 1.2 in June  - s/p 1L LR bolus in ED  - Monitor BMP  - send urine lytes and studies. results reviewed, Na >10 so low likelihood of hepatorenal process     #DM   - On home Farxiga 10mg daily  - ISS for now, target -180  - Monitor FS and adjust accordingly    #Cataract  - Continue home eye drop      #DVT ppx: Heparin  #GI ppx: Pantoprazole  #Diet: CC  #Activity: IAT  #Code: FULL code, confirmed with daughter Jazlyn López (physician, [phone])  #Dispo: Medicine   #Medrec: Confirmed with daughter

## 2024-11-27 NOTE — PROGRESS NOTE ADULT - SUBJECTIVE AND OBJECTIVE BOX
MARIAM IVORY  68y, Male  Allergy: No Known Allergies      LOS  3d    CHIEF COMPLAINT: Disorientation     (27 Nov 2024 11:33)      INTERVAL EVENTS/HPI  - No acute events overnight  - T(F): , Max: 98 (11-26-24 @ 20:55)  - Blood Cx 11/25 also growing VRE   -- Daptomycin started on evening of 11/25  - WBC Count: 9.84 (11-27-24 @ 13:57)  WBC Count: 7.66 (11-25-24 @ 06:15)     - Creatinine: 1.7 (11-27-24 @ 13:57)       ROS  General: Denies rigors, nightsweats  HEENT: Denies headache, rhinorrhea, sore throat, eye pain  CV: Denies CP, palpitations  PULM: Denies wheezing, hemoptysis  GI: Denies hematemesis, hematochezia, melena  : Denies discharge, hematuria  MSK: Denies arthralgias, myalgias  SKIN: Denies rash, lesions  NEURO: Denies paresthesias, weakness  PSYCH: Denies depression, anxiety    VITALS:  T(F): 97.6, Max: 98 (11-26-24 @ 20:55)  HR: 85  BP: 104/66  RR: 18Vital Signs Last 24 Hrs  T(C): 36.4 (27 Nov 2024 12:04), Max: 36.7 (26 Nov 2024 20:55)  T(F): 97.6 (27 Nov 2024 12:04), Max: 98 (26 Nov 2024 20:55)  HR: 85 (27 Nov 2024 12:04) (82 - 87)  BP: 104/66 (27 Nov 2024 12:04) (104/66 - 130/80)  BP(mean): --  RR: 18 (27 Nov 2024 12:04) (18 - 18)  SpO2: 100% (27 Nov 2024 12:04) (100% - 100%)    Parameters below as of 26 Nov 2024 20:55  Patient On (Oxygen Delivery Method): room air        PHYSICAL EXAM:  Gen: NAD, resting in bed  HEENT: Normocephalic, atraumatic  Neck: supple, no lymphadenopathy  CV: Regular rate & regular rhythm  Lungs: decreased BS at bases, no fremitus  Abdomen: Soft, BS present  Ext: Warm, well perfused  Neuro: non focal, awake  Skin: no rash, no erythema  Lines: no phlebitis    FH: Non-contributory  Social Hx: Non-contributory    TESTS & MEASUREMENTS:                        8.3    9.84  )-----------( 212      ( 27 Nov 2024 13:57 )             24.7     11-27    129[L]  |  105  |  25[H]  ----------------------------<  212[H]  3.3[L]   |  14[L]  |  1.7[H]    Ca    7.4[L]      27 Nov 2024 13:57    TPro  6.7  /  Alb  1.9[L]  /  TBili  0.6  /  DBili  x   /  AST  32  /  ALT  15  /  AlkPhos  234[H]  11-27      LIVER FUNCTIONS - ( 27 Nov 2024 13:57 )  Alb: 1.9 g/dL / Pro: 6.7 g/dL / ALK PHOS: 234 U/L / ALT: 15 U/L / AST: 32 U/L / GGT: x           Urinalysis Basic - ( 27 Nov 2024 13:57 )    Color: x / Appearance: x / SG: x / pH: x  Gluc: 212 mg/dL / Ketone: x  / Bili: x / Urobili: x   Blood: x / Protein: x / Nitrite: x   Leuk Esterase: x / RBC: x / WBC x   Sq Epi: x / Non Sq Epi: x / Bacteria: x        Culture - Blood (collected 11-25-24 @ 16:00)  Source: .Blood BLOOD  Gram Stain (11-26-24 @ 20:53):    Growth in anaerobic bottle: Gram Positive Cocci in Pairs and Chains    Growth in aerobic bottle: Gram Positive Cocci in Pairs and Chains  Final Report (11-27-24 @ 15:36):    Growth in aerobic and anaerobic bottles: Enterococcus faecium (vancomycin    resistant)    See previous culture [id number]    Culture - Fungal, Body Fluid (collected 11-25-24 @ 14:52)  Source: Peritoneal  Preliminary Report (11-27-24 @ 07:25):    No growth    Culture - Body Fluid with Gram Stain (collected 11-25-24 @ 14:52)  Source: Ascites Fl  Gram Stain (11-26-24 @ 03:49):    No polymorphonuclear leukocytes seen    No organisms seen    by cytocentrifuge  Preliminary Report (11-26-24 @ 18:26):    No growth    Culture - Blood (collected 11-24-24 @ 15:53)  Source: .Blood BLOOD  Gram Stain (11-25-24 @ 11:37):    Growth in anaerobic bottle: Gram Negative Rods  Final Report (11-27-24 @ 09:18):    Growth in anaerobic bottle: Escherichia coli    Direct identification is available within approximately 3-5    hours either by Blood Panel Multiplexed PCR or Direct    MALDI-TOF. Details: [email or web address]  Organism: Blood Culture PCR  Escherichia coli (11-27-24 @ 09:18)  Organism: Escherichia coli (11-27-24 @ 09:18)      Method Type: BRONWYN      -  Ampicillin: S <=8 These ampicillin results predict results for amoxicillin      -  Ampicillin/Sulbactam: S <=4/2      -  Aztreonam: S <=4      -  Cefazolin: S <=2      -  Cefepime: S <=2      -  Cefoxitin: S <=8      -  Ceftriaxone: S <=1      -  Ciprofloxacin: S <=0.25      -  Ertapenem: S <=0.5      -  Gentamicin: S <=2      -  Imipenem: S <=1      -  Levofloxacin: S <=0.5      -  Meropenem: S <=1      -  Piperacillin/Tazobactam: S <=8      -  Tobramycin: S <=2      -  Trimethoprim/Sulfamethoxazole: S <=0.5/9.5  Organism: Blood Culture PCR (11-27-24 @ 09:18)      Method Type: PCR      -  Escherichia coli: Detec    Culture - Blood (collected 11-24-24 @ 15:34)  Source: .Blood BLOOD  Gram Stain (11-25-24 @ 15:28):    Growth in aerobic bottle: Gram positive cocci in pairs    Growth in anaerobic bottle: Gram positive cocci in pairs  Final Report (11-27-24 @ 08:57):    Growth in aerobic and anaerobic bottles: Enterococcus faecium (vancomycin    resistant)    Direct identification is available within approximately 3-5    hours either by Blood Panel Multiplexed PCR or Direct    MALDI-TOF. Details: [email or web address]  Organism: Blood Culture PCR  Enterococcus faecium (vancomycin resistant) (11-27-24 @ 08:57)  Organism: Enterococcus faecium (vancomycin resistant) (11-27-24 @ 08:57)      Method Type: BRONWYN      -  Ampicillin: R >8 Predicts results to ampicillin/sulbactam, amoxacillin-clavulanate and  piperacillin-tazobactam.      -  Daptomycin: SDD 4 The breakpoint for SDD (susceptible dose dependent)is based on a dosage regimen of 8-12 mg/kg administered every 24 h in adults and is intended for serious infections due to E. faecium. Consultation with an infectious diseases specialist is recommended.      -  Linezolid: S <=1      -  Vancomycin: R >16      -  Gentamicin synergy: R >500      -  Streptomycin synergy: S <=1000  Organism: Blood Culture PCR (11-27-24 @ 08:57)      Method Type: PCR      -  Enterococcus faecium,VRE: Detec    Urinalysis with Rflx Culture (collected 11-24-24 @ 14:37)        Lactate, Blood: 3.4 mmol/L (11-25-24 @ 01:54)  Lactate, Blood: 3.9 mmol/L (11-24-24 @ 15:42)      INFECTIOUS DISEASES TESTING  Procalcitonin: 0.96 (11-25-24 @ 06:15)      INFLAMMATORY MARKERS      RADIOLOGY & ADDITIONAL TESTS:  I have personally reviewed the last available Chest xray  CXR      CT      CARDIOLOGY TESTING  12 Lead ECG:   Ventricular Rate 97 BPM    Atrial Rate 97 BPM    P-R Interval 134 ms    QRS Duration 70 ms    Q-T Interval 350 ms    QTC Calculation(Bazett) 444 ms    P Axis 57 degrees    R Axis -2 degrees    T Axis -26 degrees    Diagnosis Line Normal sinus rhythm  Left ventricular hypertrophy  T wave abnormality, consider inferior ischemia  Abnormal ECG  Confirmed by Renato Melissa ([id number]) on 11/24/2024 4:45:10 PM (11-24-24 @ 14:47)      MEDICATIONS  cefTRIAXone   IVPB 2000 IV Intermittent every 24 hours  DAPTOmycin IVPB 500 IV Intermittent every 48 hours  dextrose 50% Injectable 25 IV Push once  dextrose 50% Injectable 12.5 IV Push once  dextrose 50% Injectable 25 IV Push once  dorzolamide 2% Ophthalmic Solution 1 Right EYE every 8 hours  glucagon  Injectable 1 IntraMuscular once  heparin   Injectable 5000 SubCutaneous every 12 hours  influenza  Vaccine (HIGH DOSE) 0.5 IntraMuscular once  insulin lispro (ADMELOG) corrective regimen sliding scale  SubCutaneous three times a day before meals  lactulose Syrup 20 Oral four times a day  megestrol 20 Oral daily  metroNIDAZOLE    Tablet 500 Oral every 12 hours  pantoprazole    Tablet 40 Oral before breakfast  polyethylene glycol 3350 17 Oral daily  potassium chloride   Powder 40 Oral once  prednisoLONE acetate 1% Suspension 1 Right EYE two times a day  timolol 0.25% Solution 1 Right EYE two times a day      WEIGHT  Weight (kg): 49.5 (11-25-24 @ 01:02)  Creatinine: 1.7 mg/dL (11-27-24 @ 13:57)      ANTIBIOTICS:  cefTRIAXone   IVPB 2000 milliGRAM(s) IV Intermittent every 24 hours  DAPTOmycin IVPB 500 milliGRAM(s) IV Intermittent every 48 hours  metroNIDAZOLE    Tablet 500 milliGRAM(s) Oral every 12 hours      All available historical records have been reviewed

## 2024-11-27 NOTE — DIETITIAN INITIAL EVALUATION ADULT - OTHER CALCULATIONS
Energy: 1450-1812kcal/day (using MSJ 1.2-1.5AF due to BMI <19)   Protein: 60-75g/day (using 1.2-1.5g/kg -- same reason as above)   Fluid: 1mL/kcal/day

## 2024-11-27 NOTE — PROGRESS NOTE ADULT - SUBJECTIVE AND OBJECTIVE BOX
Gastroenterology Progress Note      Location: [address] ([address])  Patient Name: MARIAM IVORY  Age: 68y  Gender: Male      Chief Complaint  Patient is a 68y old Male who presents with a chief complaint of Altered mental status (25 Nov 2024 11:26)  Primary diagnosis of Disorientation      Reason for Consult  Cholangiocarcinoma      Progress Note  Patient reports mild abdominal discomfort s/p tap.  He denies nausea or vomiting.  He had 3 BMs on 11/27 on lactulose per RN.  No melena or hematochezia.      Vital Signs in the last 24 hours   T(C): 36.4 (27 Nov 2024 12:04), Max: 36.7 (26 Nov 2024 20:55)  T(F): 97.6 (27 Nov 2024 12:04), Max: 98 (26 Nov 2024 20:55)  HR: 85 (27 Nov 2024 12:04) (82 - 87)  BP: 104/66 (27 Nov 2024 12:04) (104/66 - 130/80)  BP(mean): --  ABP: --  ABP(mean): --  RR: 18 (27 Nov 2024 12:04) (18 - 18)  SpO2: 100% (27 Nov 2024 12:04) (100% - 100%)    O2 Parameters below as of 26 Nov 2024 20:55  Patient On (Oxygen Delivery Method): room air      Physical Exam  * General Appearance: Alert, interactive but slow, oriented to time, place, and person, in no acute distress  * Eyes: PERRL, conjunctiva/corneas clear, EOM's intact, fundi benign, both eyes  * Lungs: Good bilateral air entry, normal breath sounds   * Heart: Regular Rate and Rhythm, normal S1 and S2, no audible murmur, rub, or gallop  * Abdomen: Symmetric, softly distended, non-tender, bowel sounds active all four quadrants  * Extremities: lower extremity pitting edema bilaterally      Investigations                          8.3    9.84  )-----------( 212      ( 27 Nov 2024 13:57 )             24.7     11-27    129[L]  |  105  |  25[H]  ----------------------------<  212[H]  3.3[L]   |  14[L]  |  1.7[H]    Ca    7.4[L]      27 Nov 2024 13:57    TPro  6.7  /  Alb  1.9[L]  /  TBili  0.6  /  DBili  x   /  AST  32  /  ALT  15  /  AlkPhos  234[H]  11-27        LIVER FUNCTIONS - ( 27 Nov 2024 13:57 )  Alb: 1.9 g/dL / Pro: 6.7 g/dL / ALK PHOS: 234 U/L / ALT: 15 U/L / AST: 32 U/L / GGT: x               Urinalysis Basic - ( 27 Nov 2024 13:57 )    Color: x / Appearance: x / SG: x / pH: x  Gluc: 212 mg/dL / Ketone: x  / Bili: x / Urobili: x   Blood: x / Protein: x / Nitrite: x   Leuk Esterase: x / RBC: x / WBC x   Sq Epi: x / Non Sq Epi: x / Bacteria: x        Current Medications  Standing Medications  cefTRIAXone   IVPB 1000 milliGRAM(s) IV Intermittent every 24 hours  dextrose 5%. 1000 milliLiter(s) (50 mL/Hr) IV Continuous <Continuous>  dextrose 5%. 1000 milliLiter(s) (100 mL/Hr) IV Continuous <Continuous>  dextrose 50% Injectable 25 Gram(s) IV Push once  dextrose 50% Injectable 12.5 Gram(s) IV Push once  dextrose 50% Injectable 25 Gram(s) IV Push once  dorzolamide 2% Ophthalmic Solution 1 Drop(s) Right EYE every 8 hours  glucagon  Injectable 1 milliGRAM(s) IntraMuscular once  heparin   Injectable 5000 Unit(s) SubCutaneous every 12 hours  influenza  Vaccine (HIGH DOSE) 0.5 milliLiter(s) IntraMuscular once  insulin lispro (ADMELOG) corrective regimen sliding scale   SubCutaneous three times a day before meals  lactulose Syrup 20 Gram(s) Oral four times a day  megestrol 20 milliGRAM(s) Oral daily  metroNIDAZOLE  IVPB 500 milliGRAM(s) IV Intermittent every 12 hours  pantoprazole    Tablet 40 milliGRAM(s) Oral before breakfast  polyethylene glycol 3350 17 Gram(s) Oral daily  prednisoLONE acetate 1% Suspension 1 Drop(s) Right EYE two times a day  timolol 0.25% Solution 1 Drop(s) Right EYE two times a day  vancomycin  IVPB 750 milliGRAM(s) IV Intermittent every 24 hours    PRN Medications  dextrose Oral Gel 15 Gram(s) Oral once PRN Blood Glucose LESS THAN 70 milliGRAM(s)/deciliter  melatonin 3 milliGRAM(s) Oral at bedtime PRN Insomnia    Singles Doses Administered  (ADM OVERRIDE) 1 each &lt;see task&gt; GiveOnce  ciprofloxacin   IVPB 400 milliGRAM(s) IV Intermittent once  lactated ringers Bolus 1000 milliLiter(s) IV Bolus once

## 2024-11-27 NOTE — PROGRESS NOTE ADULT - ASSESSMENT
Assessment and Plan  Case of a 68 year old male patient known to have history of DM, DN, cataracts, metastatic cholangiocarcinoma with pseudocirrhosis/ascites and hx of SBP and cholangitis (s/p peritoneal drain and biliary/hepatic duct drains) who was brought to the ED on 11/24 for evaluation of lethargy and episodes of confusion, found to have stable liver enzymes and evidence of large ascites on imaging. We are consulted in setting of above findings.      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  E Coli and STERLING Faecium Bacteremia on 11/24 Likely GI Translocation  Large Ascites with No Evidence of SBP; Reported Hx of SBP; s/p Peritoneal Drain at [facility]  No Evidence of Acute Cholangitis; Reported Hx of Cholangitis; s/p biliary/hepatic duct drains and cholecystoduodenostomy  at [facility]  Metabolic Encephalopathy Likely in Setting of Bacteremia; Less Likely Hepatic Encephalopathy  Chronic Elevation of ALP  Perigastric, Paraesophageal, perisplenic and right abdominal wall varices on imaging  * Diagnosed with metastatic cholangiocarcinoma last year. s/p peritoneal drain; biliary/hepatic duct drain; and axios from GB to duodenum at [facility]  * Vitals: 130/80 mmHg, HR 87 bpm, no fever 11/27  * Labs: WBC 7.66, Hb 8.3, Plt 241, Na 137, K 4.5, BUN 31, Cr 1.7 on 11/25   * Liver enzymes: 0.7/250/33/15 on 11/25  * INR 1.15 on 11/24   * LA 3.9 -> 3.4 on 11/25  * Serum ammonia 71 on 11/24  * Blood culture 11/24 E Coli and STERLING Faecium -> repeat pending  * US Abdomen Upper Quadrant Right (06.18.24 @ 02:49) Liver: Partially imaged. Partially imaged stents. No hepatic collection on provided images.Bile ducts: Common bile duct visualized. Intrahepatic biliary ducts are without significant dilatation.  * CT Abdomen and Pelvis w/ IV Cont (11.24.24 @ 14:43) >large abdominal ascites occupying the entire peritoneum. Questionable reactive changes of the small bowel due to ascites. No bowel obstruction.Drain from the level of the gallbladder to the duodenum with associatedmetal artifact obscuring evaluation. Biliary duct stents are also noted. questionable scrotal wall edema. Correlate with physical exam. Mildly nodular liver may be seen with cirrhosis and associated varices as described.  * Previous colonoscopy was last year; could not recall last EGD  * No FHx of GI cancers  * S/P drainage of 1L peritoneal fluid via peritoneal drain on 11/25: fluid not suggestive of SBP; cx no growth to date    RECOMMENDATIONS  - Obtain records from [facility]  - Trend liver enzymes. Check acute hepatitis panel  - Recommend goals of care discussion. Recommend oncology/palliative evaluation if family is agreeable (currently refusing)  - For ascites: recommend serial abdominal exams. S/P drainage of 1L peritoneal fluid via peritoneal drain on 11/25: fluid not suggestive of SBP; cx no growth to date  - ID evaluation appreciated. Continue IV Ab per ID (on Rocephin, Daptomycin, and Flagyl). Trend CK. TTE noted with thickened leafklets. Follow up repeat blood cultures. No indication to remove peritoneal drain if peritoneal fluid cx with NGTD monitor for fever; monitor MAP and keep > 65mmHg; follow up blood cultures   - For encephalopathy: Monitor BM and avoid constipation. Continue lactulose 20mg Q6h and miralax 17g QD  - Advance diet as tolerated  - Monitor for pain: management per team  - Monitor for nausea: consider antiemetics PRN if QTc is within normal  - Continue Protonix 40mg QD for GI Prophylaxis; avoid NSAIDs  - Trend CBC and transfuse as needed; keep active type and screen. Check anemia workup (iron studies)  - Follow up with [facility] team or at our [facility] located at [address] [place] [address]. Phone Number: [phone]        Thank you for your consult.  - Please note that plan was communicated with medical team.   - Please reach GI on [phone] during weekdays till 5pm.  - Please call the GI service line after 5pm on Weekdays and anytime on Weekends: [phone].      Cameron Nair MD  PGY - 5 Gastroenterology Fellow   [facility]

## 2024-11-27 NOTE — PROGRESS NOTE ADULT - SUBJECTIVE AND OBJECTIVE BOX
SUBJECTIVE/OVERNIGHT EVENTS  Today is hospital day 3d. This morning patient was seen and examined at bedside, resting comfortably in bed. No acute or major events overnight.    MEDICATIONS  STANDING MEDICATIONS  cefTRIAXone   IVPB 2000 milliGRAM(s) IV Intermittent every 24 hours  DAPTOmycin IVPB 500 milliGRAM(s) IV Intermittent every 48 hours  dextrose 50% Injectable 25 Gram(s) IV Push once  dextrose 50% Injectable 12.5 Gram(s) IV Push once  dextrose 50% Injectable 25 Gram(s) IV Push once  dorzolamide 2% Ophthalmic Solution 1 Drop(s) Right EYE every 8 hours  glucagon  Injectable 1 milliGRAM(s) IntraMuscular once  heparin   Injectable 5000 Unit(s) SubCutaneous every 12 hours  influenza  Vaccine (HIGH DOSE) 0.5 milliLiter(s) IntraMuscular once  insulin lispro (ADMELOG) corrective regimen sliding scale   SubCutaneous three times a day before meals  lactulose Syrup 20 Gram(s) Oral four times a day  megestrol 20 milliGRAM(s) Oral daily  metroNIDAZOLE    Tablet 500 milliGRAM(s) Oral every 12 hours  pantoprazole    Tablet 40 milliGRAM(s) Oral before breakfast  polyethylene glycol 3350 17 Gram(s) Oral daily  prednisoLONE acetate 1% Suspension 1 Drop(s) Right EYE two times a day  timolol 0.25% Solution 1 Drop(s) Right EYE two times a day    PRN MEDICATIONS  dextrose Oral Gel 15 Gram(s) Oral once PRN  melatonin 3 milliGRAM(s) Oral at bedtime PRN    VITALS  T(F): 98 (11-27-24 @ 04:52), Max: 98 (11-26-24 @ 20:55)  HR: 87 (11-27-24 @ 04:52) (82 - 87)  BP: 130/80 (11-27-24 @ 04:52) (124/74 - 130/80)  RR: 18 (11-27-24 @ 04:52) (18 - 18)  SpO2: 100% (11-27-24 @ 04:52) (100% - 100%)  POCT Blood Glucose.: 127 mg/dL (11-27-24 @ 11:30)  POCT Blood Glucose.: 184 mg/dL (11-26-24 @ 21:12)  POCT Blood Glucose.: 150 mg/dL (11-26-24 @ 16:32)  POCT Blood Glucose.: 168 mg/dL (11-26-24 @ 11:48)    PHYSICAL EXAM    GENERAL: NAD, lying in bed comfortably  HEAD:  Atraumatic, normocephalic  EYES: EOMI, PERRLA,   HEART: Regular rate and rhythm, no murmurs, rubs, or gallops  LUNGS: Unlabored respirations.  Clear to auscultation bilaterally, no crackles, wheezing, or rhonchi  ABDOMEN: Soft, nontender distended  EXTREMITIES: No clubbing, cyanosis, or edema. Trembliing with no asterixis   NERVOUS SYSTEM:  ranges from AOx1 to AOx3; improved now more aox3  SKIN: No rashes or lesions    LABS                    Culture - Blood (collected 25 Nov 2024 16:00)  Source: .Blood BLOOD  Gram Stain (26 Nov 2024 20:53):    Growth in anaerobic bottle: Gram Positive Cocci in Pairs and Chains    Growth in aerobic bottle: Gram Positive Cocci in Pairs and Chains  Preliminary Report (27 Nov 2024 11:11):    Growth in anaerobic bottle: Enterococcus faecium (vancomycin resistant)    See previous culture [id number]    Growth in aerobic bottle: Gram Positive Cocci in Pairs and Chains    Culture - Fungal, Body Fluid (collected 25 Nov 2024 14:52)  Source: Peritoneal  Preliminary Report (27 Nov 2024 07:25):    No growth    Culture - Body Fluid with Gram Stain (collected 25 Nov 2024 14:52)  Source: Ascites Fl  Gram Stain (26 Nov 2024 03:49):    No polymorphonuclear leukocytes seen    No organisms seen    by cytocentrifuge  Preliminary Report (26 Nov 2024 18:26):    No growth    Culture - Blood (collected 24 Nov 2024 15:53)  Source: .Blood BLOOD  Gram Stain (25 Nov 2024 11:37):    Growth in anaerobic bottle: Gram Negative Rods  Final Report (27 Nov 2024 09:18):    Growth in anaerobic bottle: Escherichia coli    Direct identification is available within approximately 3-5    hours either by Blood Panel Multiplexed PCR or Direct    MALDI-TOF. Details: [email or web address]  Organism: Blood Culture PCR  Escherichia coli (27 Nov 2024 09:18)  Organism: Escherichia coli (27 Nov 2024 09:18)  Organism: Blood Culture PCR (27 Nov 2024 09:18)    Culture - Blood (collected 24 Nov 2024 15:34)  Source: .Blood BLOOD  Gram Stain (25 Nov 2024 15:28):    Growth in aerobic bottle: Gram positive cocci in pairs    Growth in anaerobic bottle: Gram positive cocci in pairs  Final Report (27 Nov 2024 08:57):    Growth in aerobic and anaerobic bottles: Enterococcus faecium (vancomycin    resistant)    Direct identification is available within approximately 3-5    hours either by Blood Panel Multiplexed PCR or Direct    MALDI-TOF. Details: [email or web address]  Organism: Blood Culture PCR  Enterococcus faecium (vancomycin resistant) (27 Nov 2024 08:57)  Organism: Enterococcus faecium (vancomycin resistant) (27 Nov 2024 08:57)  Organism: Blood Culture PCR (27 Nov 2024 08:57)    Urinalysis with Rflx Culture (collected 24 Nov 2024 14:37)      IMAGING

## 2024-11-27 NOTE — PHYSICAL THERAPY INITIAL EVALUATION ADULT - PERTINENT HX OF CURRENT PROBLEM, REHAB EVAL
68-year-old male with a past medical history of DM, cataracts, neuropathy, and metastatic cholangiocarcinoma (follows at [facility] [place]) presenting for altered mental status. daughter Jazlyn López (physician, [phone]) who reports that patient has had short periods of confusion over the last few months, usually caused by infections (cholangitis, SBP...). This morning, patient woke up and was more lethargic than usual, with delayed responses when spoken to. He has been around sick contacts so they decided to bring him in to r/o infection as cause of lethargy. As per daughter, patient has a peritoneal drain, he drains himself 1L of peritoneal fluid daily. Last MR head to check for mets was done 2 months ago and was negative.

## 2024-11-27 NOTE — PROGRESS NOTE ADULT - ATTENDING COMMENTS
68-year-old male with a past medical history of DM, cataracts, neuropathy, and metastatic cholangiocarcinoma ([place] [facility] [place] presenting for altered mental status. daughter Jazlyn López (physician, [phone]) who reports that patient has had short periods of confusion over the last few months, usually caused by infections (cholangitis, SBP...). This morning, patient woke up and was more lethargic than usual, with delayed responses when spoken to. He has been around sick contacts so they decided to bring him in to r/o infection as cause of lethargy. As per daughter, patient has a peritoneal drain, he drains himself 1L of peritoneal fluid daily. Last MR head to check for mets was done 2 months ago and was negative.       #Metabolic encephalopathy   #VRE and E. coli Bacteremia   #Metastatic cholangiocarcinoma s/p CBD and hepatic duct stenting s/p cholecystoduodenostomy    #Malignant ascites s/p tunneled peritoneal catheter   #Hx of hepatic abscess   - Vitals on arrival: /72, HR 97, T 97, RR 17, SpO2 98% on RA  - On admission WBC 8, lactate 3.9, t bili 1.1, alk phos 258, ammonia 71  - UA negative, RVP negative  - CXR (11.24.24 @ 13:25): Low lung volumes without focal consolidation or pneumothorax.  - CTAP w/ IV Cont (11.24.24 @ 14:43): Large abdominal ascites occupying the entire peritoneum. Questionable reactive changes of the small bowel due to ascites. No bowel obstruction. Drain from the level of the gallbladder to the duodenum with associated metal artifact obscuring evaluation. Biliary duct stents are also noted. Questionable scrotal wall edema. Correlate with physical exam. Mildly nodular liver may be seen with cirrhosis and associated varices as described.  - CT Head No Cont (11.24.24 @ 14:43): Allowing for motion artifact, unremarkable study.  - Blood culture positive for VRE and E coli    - Ascitic studies not consistent with SBP   - Continue ceftriaxone and flagyl   - Continue Dapto (renally dosed)   - CK weekly while on Dapto   - TTE ordered to r/o infective endocarditis per ID recs  - Follow up repeat Bcx until negative     #VIPUL  - adjust abx once renal function improves     #DM   - On home Farxiga 10mg daily  - ISS for now, target -180  - Monitor FS and adjust accordingly      #DVT ppx: Heparin  #GI ppx: Pantoprazole      Pending: negative cultures

## 2024-11-27 NOTE — DIETITIAN INITIAL EVALUATION ADULT - PERTINENT LABORATORY DATA
POCT Blood Glucose.: 127 mg/dL (11-27-24 @ 11:30)  A1C with Estimated Average Glucose Result: 5.3 % (11-25-24 @ 06:15)

## 2024-11-27 NOTE — PROGRESS NOTE ADULT - ASSESSMENT
ASSESSMENT   68-year-old male with a past medical history of DM, cataracts, neuropathy, and [place]angi[place] [facility] [place] for altered mental status. On my exam, patient is AAOx3, he has some abdominal discomfort with mild pain around the drain he has in RUQ.    IMPRESSION  #Abdominal Pain/AMS   #Metastatic Cholangiocarcionma  s/p CBD and hepatic duct stenting s/p cholecystoduodenostomy s/p peritoneal drain     #E coli and VRE faecium bacteremia  - Blood Cx 11/24+ for E coli and VRE faecium  - Blood Cx 11/25 + VRE faecium   - CT Abdomen and Pelvis w/ IV Cont (11.24.24 @ 14:43) > Large abdominal ascites occupying the entire peritoneum. Questionable  reactive changes of the small bowel due to ascites. No bowel obstruction. Drain from the level of the gallbladder to the duodenum with associated metal artifact obscuring evaluation. Biliary duct stents are also noted. Questionable scrotal wall edema. Correlate with physical exam. Mildly nodular liver may be seen with cirrhosis and associated varices as  described.  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    #Abx allergy: No Known Allergies    RECOMMENDATIONS  - noted blood Cx positive on 11/25 -- daptomycin was started that evening   - repeat blood cx for surveillance   - TTE reviewed - thickened leaflets   - conitnue flagyl 500 mg q 12 hours  - continue Daptomycin 500 mg q 48 hours   -- check CK weekly (last checked 11/27)  - poor prognosis overall     This is a preliminary incomplete pended note, all final recommendations to follow after interview and examination of the patient.    Please call or message on [employer] Teams if with any questions.  Spectra [phone]   ASSESSMENT   68-year-old male with a past medical history of DM, cataracts, neuropathy, and metastatic cholangiocarcinoma (follows at [facility] [place]) presenting for altered mental status. On my exam, patient is AAOx3, he has some abdominal discomfort with mild pain around the drain he has in RUQ.    IMPRESSION  #Abdominal Pain/AMS   #Metastatic Cholangiocarcionma  s/p CBD and hepatic duct stenting s/p cholecystoduodenostomy s/p peritoneal drain     #E coli and VRE faecium bacteremia  - Blood Cx 11/24+ for E coli and VRE faecium  - Blood Cx 11/25 + VRE faecium   - CT Abdomen and Pelvis w/ IV Cont (11.24.24 @ 14:43) > Large abdominal ascites occupying the entire peritoneum. Questionable  reactive changes of the small bowel due to ascites. No bowel obstruction. Drain from the level of the gallbladder to the duodenum with associated metal artifact obscuring evaluation. Biliary duct stents are also noted. Questionable scrotal wall edema. Correlate with physical exam. Mildly nodular liver may be seen with cirrhosis and associated varices as  described.  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    #Abx allergy: No Known Allergies    RECOMMENDATIONS  - noted blood Cx positive on 11/25 -- although daptomycin was started that evening   - repeat blood cx for surveillance   - TTE reviewed - thickened leaflets; as 2 Cx positive for Enterococcus, may point to intravascular infection - ideally would obtain KELVIN to ensure no endocarditis; if this is positive, port would have to be removed as well; Peritoneal fluid not growing bacterial and cell counts are not suggestive of peritonitis  - conitnue flagyl 500 mg q 12 hours  - continue Daptomycin 500 mg q 48 hours   -- check CK weekly (last checked 11/27)  - poor prognosis overall -- discussed previous plans for chemo and is on break for him to get stronger before resuming chemo therapy -- will need to clarify oncology course as well     Please call or message on [employer] Teams if with any questions.  Spectra [phone]

## 2024-11-28 LAB
ALBUMIN SERPL ELPH-MCNC: 2.2 G/DL — LOW (ref 3.5–5.2)
ALP SERPL-CCNC: 275 U/L — HIGH (ref 30–115)
ALT FLD-CCNC: 19 U/L — SIGNIFICANT CHANGE UP (ref 0–41)
ANION GAP SERPL CALC-SCNC: 9 MMOL/L — SIGNIFICANT CHANGE UP (ref 7–14)
AST SERPL-CCNC: 40 U/L — SIGNIFICANT CHANGE UP (ref 0–41)
BILIRUB SERPL-MCNC: 0.6 MG/DL — SIGNIFICANT CHANGE UP (ref 0.2–1.2)
BUN SERPL-MCNC: 23 MG/DL — HIGH (ref 10–20)
CALCIUM SERPL-MCNC: 7.6 MG/DL — LOW (ref 8.4–10.5)
CHLORIDE SERPL-SCNC: 103 MMOL/L — SIGNIFICANT CHANGE UP (ref 98–110)
CO2 SERPL-SCNC: 16 MMOL/L — LOW (ref 17–32)
CREAT SERPL-MCNC: 1.6 MG/DL — HIGH (ref 0.7–1.5)
EGFR: 46 ML/MIN/1.73M2 — LOW
GLUCOSE BLDC GLUCOMTR-MCNC: 110 MG/DL — HIGH (ref 70–99)
GLUCOSE BLDC GLUCOMTR-MCNC: 151 MG/DL — HIGH (ref 70–99)
GLUCOSE BLDC GLUCOMTR-MCNC: 162 MG/DL — HIGH (ref 70–99)
GLUCOSE SERPL-MCNC: 145 MG/DL — HIGH (ref 70–99)
HCT VFR BLD CALC: 28.4 % — LOW (ref 42–52)
HGB BLD-MCNC: 9.4 G/DL — LOW (ref 14–18)
LACTATE SERPL-SCNC: 1.7 MMOL/L — SIGNIFICANT CHANGE UP (ref 0.7–2)
MCHC RBC-ENTMCNC: 28.4 PG — SIGNIFICANT CHANGE UP (ref 27–31)
MCHC RBC-ENTMCNC: 33.1 G/DL — SIGNIFICANT CHANGE UP (ref 32–37)
MCV RBC AUTO: 85.8 FL — SIGNIFICANT CHANGE UP (ref 80–94)
NRBC # BLD: 0 /100 WBCS — SIGNIFICANT CHANGE UP (ref 0–0)
PLATELET # BLD AUTO: 248 K/UL — SIGNIFICANT CHANGE UP (ref 130–400)
PMV BLD: 9.1 FL — SIGNIFICANT CHANGE UP (ref 7.4–10.4)
POTASSIUM SERPL-MCNC: 3.3 MMOL/L — LOW (ref 3.5–5)
POTASSIUM SERPL-SCNC: 3.3 MMOL/L — LOW (ref 3.5–5)
PROT SERPL-MCNC: 7.7 G/DL — SIGNIFICANT CHANGE UP (ref 6–8)
RBC # BLD: 3.31 M/UL — LOW (ref 4.7–6.1)
RBC # FLD: 15.3 % — HIGH (ref 11.5–14.5)
SODIUM SERPL-SCNC: 128 MMOL/L — LOW (ref 135–146)
WBC # BLD: 13.54 K/UL — HIGH (ref 4.8–10.8)
WBC # FLD AUTO: 13.54 K/UL — HIGH (ref 4.8–10.8)

## 2024-11-28 PROCEDURE — 99232 SBSQ HOSP IP/OBS MODERATE 35: CPT

## 2024-11-28 RX ORDER — SODIUM BICARBONATE 84 MG/ML
1300 INJECTION, SOLUTION INTRAVENOUS EVERY 8 HOURS
Refills: 0 | Status: DISCONTINUED | OUTPATIENT
Start: 2024-11-28 | End: 2024-11-30

## 2024-11-28 RX ORDER — ACETAMINOPHEN 500MG 500 MG/1
650 TABLET, COATED ORAL ONCE
Refills: 0 | Status: COMPLETED | OUTPATIENT
Start: 2024-11-28 | End: 2024-11-28

## 2024-11-28 RX ORDER — POTASSIUM CHLORIDE 600 MG/1
40 TABLET, EXTENDED RELEASE ORAL ONCE
Refills: 0 | Status: COMPLETED | OUTPATIENT
Start: 2024-11-28 | End: 2024-11-28

## 2024-11-28 RX ORDER — OXYCODONE HYDROCHLORIDE 30 MG/1
2.5 TABLET ORAL ONCE
Refills: 0 | Status: DISCONTINUED | OUTPATIENT
Start: 2024-11-28 | End: 2024-11-28

## 2024-11-28 RX ADMIN — Medication 1 DROP(S): at 17:09

## 2024-11-28 RX ADMIN — Medication 5000 UNIT(S): at 06:32

## 2024-11-28 RX ADMIN — ACETAMINOPHEN 500MG 650 MILLIGRAM(S): 500 TABLET, COATED ORAL at 11:30

## 2024-11-28 RX ADMIN — LACTULOSE 20 GRAM(S): 10 SOLUTION ORAL at 12:14

## 2024-11-28 RX ADMIN — OXYCODONE HYDROCHLORIDE 2.5 MILLIGRAM(S): 30 TABLET ORAL at 18:15

## 2024-11-28 RX ADMIN — MEGESTROL ACETATE 20 MILLIGRAM(S): 40 SUSPENSION ORAL at 12:14

## 2024-11-28 RX ADMIN — SODIUM BICARBONATE 1300 MILLIGRAM(S): 84 INJECTION, SOLUTION INTRAVENOUS at 21:23

## 2024-11-28 RX ADMIN — DORZOLAMIDE HYDROCHLORIDE 1 DROP(S): 20 SOLUTION OPHTHALMIC at 13:58

## 2024-11-28 RX ADMIN — LACTULOSE 20 GRAM(S): 10 SOLUTION ORAL at 06:31

## 2024-11-28 RX ADMIN — DORZOLAMIDE HYDROCHLORIDE 1 DROP(S): 20 SOLUTION OPHTHALMIC at 21:23

## 2024-11-28 RX ADMIN — METRONIDAZOLE 500 MILLIGRAM(S): 500 TABLET ORAL at 06:32

## 2024-11-28 RX ADMIN — Medication 5000 UNIT(S): at 17:05

## 2024-11-28 RX ADMIN — ACETAMINOPHEN, DIPHENHYDRAMINE HCL, PHENYLEPHRINE HCL 3 MILLIGRAM(S): 325; 25; 5 TABLET ORAL at 20:36

## 2024-11-28 RX ADMIN — POTASSIUM CHLORIDE 40 MILLIEQUIVALENT(S): 600 TABLET, EXTENDED RELEASE ORAL at 15:18

## 2024-11-28 RX ADMIN — Medication 1: at 17:01

## 2024-11-28 RX ADMIN — SODIUM BICARBONATE 1300 MILLIGRAM(S): 84 INJECTION, SOLUTION INTRAVENOUS at 14:40

## 2024-11-28 RX ADMIN — PREDNISOLONE ACETATE 1 DROP(S): 1.2 SUSPENSION/ DROPS OPHTHALMIC at 17:09

## 2024-11-28 RX ADMIN — LACTULOSE 20 GRAM(S): 10 SOLUTION ORAL at 17:05

## 2024-11-28 RX ADMIN — Medication 1: at 07:52

## 2024-11-28 RX ADMIN — DORZOLAMIDE HYDROCHLORIDE 1 DROP(S): 20 SOLUTION OPHTHALMIC at 06:33

## 2024-11-28 RX ADMIN — PREDNISOLONE ACETATE 1 DROP(S): 1.2 SUSPENSION/ DROPS OPHTHALMIC at 06:33

## 2024-11-28 RX ADMIN — Medication 1 DROP(S): at 06:32

## 2024-11-28 RX ADMIN — Medication 100 MILLIGRAM(S): at 13:58

## 2024-11-28 RX ADMIN — LACTULOSE 20 GRAM(S): 10 SOLUTION ORAL at 00:12

## 2024-11-28 RX ADMIN — ACETAMINOPHEN 500MG 650 MILLIGRAM(S): 500 TABLET, COATED ORAL at 10:48

## 2024-11-28 RX ADMIN — PANTOPRAZOLE SODIUM 40 MILLIGRAM(S): 40 TABLET, DELAYED RELEASE ORAL at 06:32

## 2024-11-28 RX ADMIN — METRONIDAZOLE 500 MILLIGRAM(S): 500 TABLET ORAL at 17:05

## 2024-11-28 NOTE — PROGRESS NOTE ADULT - SUBJECTIVE AND OBJECTIVE BOX
Pt seen and examined at bedside.    VITAL SIGNS (Last 24 hrs):  T(C): 36.4 (11-28-24 @ 04:34), Max: 36.8 (11-27-24 @ 20:04)  HR: 96 (11-28-24 @ 04:34) (91 - 96)  BP: 115/72 (11-28-24 @ 04:34) (111/63 - 115/72)  RR: 18 (11-28-24 @ 04:34) (18 - 18)  SpO2: 98% (11-28-24 @ 04:34) (98% - 100%)  Wt(kg): --  Daily     Daily     I&O's Summary    27 Nov 2024 07:01  -  28 Nov 2024 07:00  --------------------------------------------------------  IN: 0 mL / OUT: 500 mL / NET: -500 mL        PHYSICAL EXAM:  GENERAL: NAD   HEAD:  Atraumatic, Normocephalic  EYES:  conjunctiva and sclera clear  NECK: Supple, No JVD  CHEST/LUNG: Clear to auscultation bilaterally; No wheeze  HEART: Regular rate and rhythm; No murmurs, rubs, or gallops  ABDOMEN: Soft, Nontender, Nondistended + tunneled peritoneal catheter  EXTREMITIES:  2+ Peripheral Pulses, No clubbing, cyanosis, or edema  SKIN: No rashes or lesions    Labs Reviewed  Spoke to patient in regards to abnormal labs.    CBC Full  -  ( 27 Nov 2024 13:57 )  WBC Count : 9.84 K/uL  Hemoglobin : 8.3 g/dL  Hematocrit : 24.7 %  Platelet Count - Automated : 212 K/uL  Mean Cell Volume : 84.0 fL  Mean Cell Hemoglobin : 28.2 pg  Mean Cell Hemoglobin Concentration : 33.6 g/dL  Auto Neutrophil # : x  Auto Lymphocyte # : x  Auto Monocyte # : x  Auto Eosinophil # : x  Auto Basophil # : x  Auto Neutrophil % : x  Auto Lymphocyte % : x  Auto Monocyte % : x  Auto Eosinophil % : x  Auto Basophil % : x    BMP:    11-27 @ 13:57    Blood Urea Nitrogen - 25  Calcium - 7.4  Carbond Dioxide - 14  Chloride - 105  Creatinine - 1.7  Glucose - 212  Potassium - 3.3  Sodium - 129      Hemoglobin A1c -   PT/INR - ( 24 Nov 2024 15:42 )   PT: 13.60 sec;   INR: 1.15 ratio         PTT - ( 24 Nov 2024 15:42 )  PTT:34.6 sec  Urine Culture:  11-25 @ 16:00 Urine culture: --    Culture Results:   Growth in aerobic and anaerobic bottles: Enterococcus faecium (vancomycin  resistant)  See previous culture [id number]  Method Type: --  Organism: --  Organism Identification: --  Specimen Source: .Blood BLOOD  11-25 @ 14:52 Urine culture: --    Culture Results:   No growth  Method Type: --  Organism: --  Organism Identification: --  Specimen Source: Ascites Fl  11-24 @ 15:53 Urine culture: --    Culture Results:   Growth in anaerobic bottle: Escherichia coli  Direct identification is available within approximately 3-5  hours either by Blood Panel Multiplexed PCR or Direct  MALDI-TOF. Details: [email or web address]  Method Type: PCR  Organism: Blood Culture PCR  Organism Identification: Blood Culture PCR  Escherichia coli  Specimen Source: .Blood BLOOD  11-24 @ 15:34 Urine culture: --    Culture Results:   Growth in aerobic and anaerobic bottles: Enterococcus faecium (vancomycin  resistant)  Direct identification is available within approximately 3-5  hours either by Blood Panel Multiplexed PCR or Direct  MALDI-TOF. Details: [email or web address]  Method Type: PCR  Organism: Blood Culture PCR  Organism Identification: Blood Culture PCR  Enterococcus faecium (vancomycin resistant)  Specimen Source: .Blood BLOOD            MEDICATIONS  (STANDING):  cefTRIAXone   IVPB 2000 milliGRAM(s) IV Intermittent every 24 hours  DAPTOmycin IVPB 500 milliGRAM(s) IV Intermittent every 48 hours  dextrose 50% Injectable 25 Gram(s) IV Push once  dextrose 50% Injectable 12.5 Gram(s) IV Push once  dextrose 50% Injectable 25 Gram(s) IV Push once  dorzolamide 2% Ophthalmic Solution 1 Drop(s) Right EYE every 8 hours  glucagon  Injectable 1 milliGRAM(s) IntraMuscular once  heparin   Injectable 5000 Unit(s) SubCutaneous every 12 hours  influenza  Vaccine (HIGH DOSE) 0.5 milliLiter(s) IntraMuscular once  insulin lispro (ADMELOG) corrective regimen sliding scale   SubCutaneous three times a day before meals  lactulose Syrup 20 Gram(s) Oral four times a day  megestrol 20 milliGRAM(s) Oral daily  metroNIDAZOLE    Tablet 500 milliGRAM(s) Oral every 12 hours  pantoprazole    Tablet 40 milliGRAM(s) Oral before breakfast  polyethylene glycol 3350 17 Gram(s) Oral daily  prednisoLONE acetate 1% Suspension 1 Drop(s) Right EYE two times a day  timolol 0.25% Solution 1 Drop(s) Right EYE two times a day    MEDICATIONS  (PRN):  dextrose Oral Gel 15 Gram(s) Oral once PRN Blood Glucose LESS THAN 70 milliGRAM(s)/deciliter  melatonin 3 milliGRAM(s) Oral at bedtime PRN Insomnia

## 2024-11-28 NOTE — PROGRESS NOTE ADULT - ASSESSMENT
68-year-old male with a past medical history of DM, cataracts, neuropathy, and metastatic cholangiocarcinoma ([place] [facility] [place] presenting for altered mental status. daughter Jazlyn López (physician, [phone]) who reports that patient has had short periods of confusion over the last few months, usually caused by infections (cholangitis, SBP...). This morning, patient woke up and was more lethargic than usual, with delayed responses when spoken to. He has been around sick contacts so they decided to bring him in to r/o infection as cause of lethargy. As per daughter, patient has a peritoneal drain, he drains himself 1L of peritoneal fluid daily. Last MR head to check for mets was done 2 months ago and was negative.       #Metabolic encephalopathy   #VRE and E. coli Bacteremia   #Metastatic cholangiocarcinoma s/p CBD and hepatic duct stenting s/p cholecystoduodenostomy    #Malignant ascites s/p tunneled peritoneal catheter   #Hx of hepatic abscess   - Vitals on arrival: /72, HR 97, T 97, RR 17, SpO2 98% on RA  - On admission WBC 8, lactate 3.9, t bili 1.1, alk phos 258, ammonia 71  - UA negative, RVP negative  - CXR (11.24.24 @ 13:25): Low lung volumes without focal consolidation or pneumothorax.  - CTAP w/ IV Cont (11.24.24 @ 14:43): Large abdominal ascites occupying the entire peritoneum. Questionable reactive changes of the small bowel due to ascites. No bowel obstruction. Drain from the level of the gallbladder to the duodenum with associated metal artifact obscuring evaluation. Biliary duct stents are also noted. Questionable scrotal wall edema. Correlate with physical exam. Mildly nodular liver may be seen with cirrhosis and associated varices as described.  - CT Head No Cont (11.24.24 @ 14:43): Allowing for motion artifact, unremarkable study.  - Blood culture positive for VRE and E coli    - Ascitic studies not consistent with SBP   - Continue ceftriaxone and flagyl   - Continue Dapto (renally dosed)   - CK weekly while on Dapto   - TTE negative   - Cardio consulted for KELVIN  - Follow up repeat Bcx until negative     #VIPUL vs CKD3  #Metabolic acidosis   - adjust abx once renal function improves   - start sodium bicarbonate     #DM   - On home Farxiga 10mg daily  - ISS for now, target -180  - Monitor FS and adjust accordingly      #DVT ppx: Heparin  #GI ppx: Pantoprazole      Pending: negative cultures, KELVIN

## 2024-11-29 LAB
ALBUMIN SERPL ELPH-MCNC: 2 G/DL — LOW (ref 3.5–5.2)
ALP SERPL-CCNC: 243 U/L — HIGH (ref 30–115)
ALT FLD-CCNC: 16 U/L — SIGNIFICANT CHANGE UP (ref 0–41)
ANION GAP SERPL CALC-SCNC: 12 MMOL/L — SIGNIFICANT CHANGE UP (ref 7–14)
AST SERPL-CCNC: 47 U/L — HIGH (ref 0–41)
BILIRUB SERPL-MCNC: 0.5 MG/DL — SIGNIFICANT CHANGE UP (ref 0.2–1.2)
BUN SERPL-MCNC: 27 MG/DL — HIGH (ref 10–20)
CALCIUM SERPL-MCNC: 7.5 MG/DL — LOW (ref 8.4–10.5)
CHLORIDE SERPL-SCNC: 106 MMOL/L — SIGNIFICANT CHANGE UP (ref 98–110)
CO2 SERPL-SCNC: 14 MMOL/L — LOW (ref 17–32)
CREAT SERPL-MCNC: 1.8 MG/DL — HIGH (ref 0.7–1.5)
EGFR: 40 ML/MIN/1.73M2 — LOW
GLUCOSE BLDC GLUCOMTR-MCNC: 124 MG/DL — HIGH (ref 70–99)
GLUCOSE BLDC GLUCOMTR-MCNC: 136 MG/DL — HIGH (ref 70–99)
GLUCOSE BLDC GLUCOMTR-MCNC: 143 MG/DL — HIGH (ref 70–99)
GLUCOSE BLDC GLUCOMTR-MCNC: 256 MG/DL — HIGH (ref 70–99)
GLUCOSE SERPL-MCNC: 133 MG/DL — HIGH (ref 70–99)
MAGNESIUM SERPL-MCNC: 2 MG/DL — SIGNIFICANT CHANGE UP (ref 1.8–2.4)
POTASSIUM SERPL-MCNC: 4.6 MMOL/L — SIGNIFICANT CHANGE UP (ref 3.5–5)
POTASSIUM SERPL-SCNC: 4.6 MMOL/L — SIGNIFICANT CHANGE UP (ref 3.5–5)
PROT SERPL-MCNC: 7.2 G/DL — SIGNIFICANT CHANGE UP (ref 6–8)
SODIUM SERPL-SCNC: 132 MMOL/L — LOW (ref 135–146)

## 2024-11-29 PROCEDURE — 99232 SBSQ HOSP IP/OBS MODERATE 35: CPT

## 2024-11-29 PROCEDURE — 99223 1ST HOSP IP/OBS HIGH 75: CPT | Mod: FS

## 2024-11-29 RX ADMIN — LACTULOSE 20 GRAM(S): 10 SOLUTION ORAL at 18:08

## 2024-11-29 RX ADMIN — LACTULOSE 20 GRAM(S): 10 SOLUTION ORAL at 00:20

## 2024-11-29 RX ADMIN — Medication 5000 UNIT(S): at 18:08

## 2024-11-29 RX ADMIN — LACTULOSE 20 GRAM(S): 10 SOLUTION ORAL at 12:58

## 2024-11-29 RX ADMIN — SODIUM BICARBONATE 1300 MILLIGRAM(S): 84 INJECTION, SOLUTION INTRAVENOUS at 21:00

## 2024-11-29 RX ADMIN — METRONIDAZOLE 500 MILLIGRAM(S): 500 TABLET ORAL at 05:12

## 2024-11-29 RX ADMIN — PREDNISOLONE ACETATE 1 DROP(S): 1.2 SUSPENSION/ DROPS OPHTHALMIC at 18:09

## 2024-11-29 RX ADMIN — LACTULOSE 20 GRAM(S): 10 SOLUTION ORAL at 05:12

## 2024-11-29 RX ADMIN — Medication 1 DROP(S): at 18:09

## 2024-11-29 RX ADMIN — Medication 5000 UNIT(S): at 05:12

## 2024-11-29 RX ADMIN — DORZOLAMIDE HYDROCHLORIDE 1 DROP(S): 20 SOLUTION OPHTHALMIC at 21:00

## 2024-11-29 RX ADMIN — MEGESTROL ACETATE 20 MILLIGRAM(S): 40 SUSPENSION ORAL at 12:58

## 2024-11-29 RX ADMIN — DAPTOMYCIN 120 MILLIGRAM(S): 500 INJECTION, POWDER, LYOPHILIZED, FOR SOLUTION INTRAVENOUS at 20:51

## 2024-11-29 RX ADMIN — SODIUM BICARBONATE 1300 MILLIGRAM(S): 84 INJECTION, SOLUTION INTRAVENOUS at 05:12

## 2024-11-29 RX ADMIN — Medication 1 DROP(S): at 05:18

## 2024-11-29 RX ADMIN — SODIUM BICARBONATE 1300 MILLIGRAM(S): 84 INJECTION, SOLUTION INTRAVENOUS at 14:21

## 2024-11-29 RX ADMIN — METRONIDAZOLE 500 MILLIGRAM(S): 500 TABLET ORAL at 18:08

## 2024-11-29 RX ADMIN — PREDNISOLONE ACETATE 1 DROP(S): 1.2 SUSPENSION/ DROPS OPHTHALMIC at 05:17

## 2024-11-29 RX ADMIN — PANTOPRAZOLE SODIUM 40 MILLIGRAM(S): 40 TABLET, DELAYED RELEASE ORAL at 05:12

## 2024-11-29 RX ADMIN — DORZOLAMIDE HYDROCHLORIDE 1 DROP(S): 20 SOLUTION OPHTHALMIC at 15:05

## 2024-11-29 RX ADMIN — Medication 100 MILLIGRAM(S): at 14:25

## 2024-11-29 RX ADMIN — DORZOLAMIDE HYDROCHLORIDE 1 DROP(S): 20 SOLUTION OPHTHALMIC at 05:17

## 2024-11-29 NOTE — PROGRESS NOTE ADULT - ATTENDING COMMENTS
68-year-old male with a past medical history of DM, cataracts, neuropathy, and metastatic cholangiocarcinoma ([place] at [facility] [place] presenting for altered mental status. daughter Jazlyn López (physician, [phone]) who reports that patient has had short periods of confusion over the last few months, usually caused by infections (cholangitis, SBP...). This morning, patient woke up and was more lethargic than usual, with delayed responses when spoken to. He has been around sick contacts so they decided to bring him in to r/o infection as cause of lethargy. As per daughter, patient has a peritoneal drain, he drains himself 1L of peritoneal fluid daily. Last MR head to check for mets was done 2 months ago and was negative.       #Metabolic encephalopathy   #VRE and E. coli Bacteremia   #Metastatic cholangiocarcinoma s/p CBD and hepatic duct stenting s/p cholecystoduodenostomy    #Malignant ascites s/p tunneled peritoneal catheter   #Hx of hepatic abscess   - Vitals on arrival: /72, HR 97, T 97, RR 17, SpO2 98% on RA  - On admission WBC 8, lactate 3.9, t bili 1.1, alk phos 258, ammonia 71  - UA negative, RVP negative  - CXR (11.24.24 @ 13:25): Low lung volumes without focal consolidation or pneumothorax.  - CTAP w/ IV Cont (11.24.24 @ 14:43): Large abdominal ascites occupying the entire peritoneum. Questionable reactive changes of the small bowel due to ascites. No bowel obstruction. Drain from the level of the gallbladder to the duodenum with associated metal artifact obscuring evaluation. Biliary duct stents are also noted. Questionable scrotal wall edema. Correlate with physical exam. Mildly nodular liver may be seen with cirrhosis and associated varices as described.  - CT Head No Cont (11.24.24 @ 14:43): Allowing for motion artifact, unremarkable study.  - Blood culture positive for VRE and E coli    - Ascitic studies not consistent with SBP   - Continue ceftriaxone and flagyl   - Continue Dapto (renally dosed)   - CK weekly while on Dapto   - TTE negative   - Cardio consulted for KELVIN  - Follow up repeat Bcx until negative     #CKD3  #Metabolic acidosis   - adjust abx once renal function improves   - c/w sodium bicarbonate     #DM   - On home Farxiga 10mg daily  - ISS for now, target -180  - Monitor FS and adjust accordingly      #DVT ppx: Heparin  #GI ppx: Pantoprazole      Pending: negative cultures, KELVIN

## 2024-11-29 NOTE — PROGRESS NOTE ADULT - SUBJECTIVE AND OBJECTIVE BOX
MARIAM IVORY  68y, Male  Allergy: No Known Allergies      LOS  5d    CHIEF COMPLAINT: Altered mental status (29 Nov 2024 13:17)      INTERVAL EVENTS/HPI  - No acute events overnight  - T(F): , Max: 97.8 (11-29-24 @ 14:29)  - reports abdominal pain improving  - WBC Count: 13.54 (11-28-24 @ 14:01)  WBC Count: 9.84 (11-27-24 @ 13:57)     - Creatinine: 1.8 (11-29-24 @ 11:36)  Creatinine: 1.6 (11-28-24 @ 14:01)       ROS  General: Denies rigors, nightsweats  HEENT: Denies headache, rhinorrhea, sore throat, eye pain  CV: Denies CP, palpitations  PULM: Denies wheezing, hemoptysis  GI: Denies hematemesis, hematochezia, melena  : Denies discharge, hematuria  MSK: Denies arthralgias, myalgias  SKIN: Denies rash, lesions  NEURO: Denies paresthesias, weakness  PSYCH: Denies depression, anxiety    VITALS:  T(F): 97.8, Max: 97.8 (11-29-24 @ 14:29)  HR: 96  BP: 113/74  RR: 18Vital Signs Last 24 Hrs  T(C): 36.6 (29 Nov 2024 14:29), Max: 36.6 (29 Nov 2024 14:29)  T(F): 97.8 (29 Nov 2024 14:29), Max: 97.8 (29 Nov 2024 14:29)  HR: 96 (29 Nov 2024 14:29) (71 - 96)  BP: 113/74 (29 Nov 2024 14:29) (111/72 - 113/74)  BP(mean): --  RR: 18 (29 Nov 2024 14:29) (18 - 18)  SpO2: 99% (29 Nov 2024 14:29) (99% - 100%)    Parameters below as of 29 Nov 2024 14:29  Patient On (Oxygen Delivery Method): room air        PHYSICAL EXAM:  Gen: NAD, resting in bed  HEENT: Normocephalic, atraumatic  Neck: supple, no lymphadenopathy  CV: Regular rate & regular rhythm  Lungs: decreased BS at bases, no fremitus  Abdomen: Soft, BS present  Ext: Warm, well perfused  Neuro: non focal, awake  Skin: no rash, no erythema  Lines: no phlebitis    FH: Non-contributory  Social Hx: Non-contributory    TESTS & MEASUREMENTS:                        9.4    13.54 )-----------( 248      ( 28 Nov 2024 14:01 )             28.4     11-29    132[L]  |  106  |  27[H]  ----------------------------<  133[H]  4.6   |  14[L]  |  1.8[H]    Ca    7.5[L]      29 Nov 2024 11:36  Mg     2.0     11-29    TPro  7.2  /  Alb  2.0[L]  /  TBili  0.5  /  DBili  x   /  AST  47[H]  /  ALT  16  /  AlkPhos  243[H]  11-29      LIVER FUNCTIONS - ( 29 Nov 2024 11:36 )  Alb: 2.0 g/dL / Pro: 7.2 g/dL / ALK PHOS: 243 U/L / ALT: 16 U/L / AST: 47 U/L / GGT: x           Urinalysis Basic - ( 29 Nov 2024 11:36 )    Color: x / Appearance: x / SG: x / pH: x  Gluc: 133 mg/dL / Ketone: x  / Bili: x / Urobili: x   Blood: x / Protein: x / Nitrite: x   Leuk Esterase: x / RBC: x / WBC x   Sq Epi: x / Non Sq Epi: x / Bacteria: x        Culture - Blood (collected 11-27-24 @ 13:57)  Source: .Blood BLOOD  Preliminary Report (11-28-24 @ 23:01):    No growth at 24 hours    Culture - Blood (collected 11-25-24 @ 16:00)  Source: .Blood BLOOD  Gram Stain (11-26-24 @ 20:53):    Growth in anaerobic bottle: Gram Positive Cocci in Pairs and Chains    Growth in aerobic bottle: Gram Positive Cocci in Pairs and Chains  Final Report (11-27-24 @ 15:36):    Growth in aerobic and anaerobic bottles: Enterococcus faecium (vancomycin    resistant)    See previous culture [id number]    Culture - Fungal, Body Fluid (collected 11-25-24 @ 14:52)  Source: Peritoneal  Preliminary Report (11-29-24 @ 08:37):    No growth    Culture - Body Fluid with Gram Stain (collected 11-25-24 @ 14:52)  Source: Ascites Fl  Gram Stain (11-26-24 @ 03:49):    No polymorphonuclear leukocytes seen    No organisms seen    by cytocentrifuge  Preliminary Report (11-26-24 @ 18:26):    No growth    Culture - Blood (collected 11-24-24 @ 15:53)  Source: .Blood BLOOD  Gram Stain (11-25-24 @ 11:37):    Growth in anaerobic bottle: Gram Negative Rods  Final Report (11-27-24 @ 09:18):    Growth in anaerobic bottle: Escherichia coli    Direct identification is available within approximately 3-5    hours either by Blood Panel Multiplexed PCR or Direct    MALDI-TOF. Details: [email or web address]  Organism: Blood Culture PCR  Escherichia coli (11-27-24 @ 09:18)  Organism: Escherichia coli (11-27-24 @ 09:18)      Method Type: BRONWYN      -  Ampicillin: S <=8 These ampicillin results predict results for amoxicillin      -  Ampicillin/Sulbactam: S <=4/2      -  Aztreonam: S <=4      -  Cefazolin: S <=2      -  Cefepime: S <=2      -  Cefoxitin: S <=8      -  Ceftriaxone: S <=1      -  Ciprofloxacin: S <=0.25      -  Ertapenem: S <=0.5      -  Gentamicin: S <=2      -  Imipenem: S <=1      -  Levofloxacin: S <=0.5      -  Meropenem: S <=1      -  Piperacillin/Tazobactam: S <=8      -  Tobramycin: S <=2      -  Trimethoprim/Sulfamethoxazole: S <=0.5/9.5  Organism: Blood Culture PCR (11-27-24 @ 09:18)      Method Type: PCR      -  Escherichia coli: Detec    Culture - Blood (collected 11-24-24 @ 15:34)  Source: .Blood BLOOD  Gram Stain (11-25-24 @ 15:28):    Growth in aerobic bottle: Gram positive cocci in pairs    Growth in anaerobic bottle: Gram positive cocci in pairs  Final Report (11-27-24 @ 08:57):    Growth in aerobic and anaerobic bottles: Enterococcus faecium (vancomycin    resistant)    Direct identification is available within approximately 3-5    hours either by Blood Panel Multiplexed PCR or Direct    MALDI-TOF. Details: [email or web address]  Organism: Blood Culture PCR  Enterococcus faecium (vancomycin resistant) (11-27-24 @ 08:57)  Organism: Enterococcus faecium (vancomycin resistant) (11-27-24 @ 08:57)      Method Type: BRONWYN      -  Ampicillin: R >8 Predicts results to ampicillin/sulbactam, amoxacillin-clavulanate and  piperacillin-tazobactam.      -  Daptomycin: SDD 4 The breakpoint for SDD (susceptible dose dependent)is based on a dosage regimen of 8-12 mg/kg administered every 24 h in adults and is intended for serious infections due to E. faecium. Consultation with an infectious diseases specialist is recommended.      -  Linezolid: S <=1      -  Vancomycin: R >16      -  Gentamicin synergy: R >500      -  Streptomycin synergy: S <=1000  Organism: Blood Culture PCR (11-27-24 @ 08:57)      Method Type: PCR      -  Enterococcus faecium,VRE: Detec    Urinalysis with Rflx Culture (collected 11-24-24 @ 14:37)        Lactate, Blood: 1.7 mmol/L (11-28-24 @ 14:01)  Lactate, Blood: 3.4 mmol/L (11-25-24 @ 01:54)      INFECTIOUS DISEASES TESTING  Procalcitonin: 0.96 (11-25-24 @ 06:15)      INFLAMMATORY MARKERS      RADIOLOGY & ADDITIONAL TESTS:  I have personally reviewed the last available Chest xray  CXR      CT      CARDIOLOGY TESTING  12 Lead ECG:   Ventricular Rate 97 BPM    Atrial Rate 97 BPM    P-R Interval 134 ms    QRS Duration 70 ms    Q-T Interval 350 ms    QTC Calculation(Bazett) 444 ms    P Axis 57 degrees    R Axis -2 degrees    T Axis -26 degrees    Diagnosis Line Normal sinus rhythm  Left ventricular hypertrophy  T wave abnormality, consider inferior ischemia  Abnormal ECG  Confirmed by Renato Melissa ([id number]) on 11/24/2024 4:45:10 PM (11-24-24 @ 14:47)      MEDICATIONS  cefTRIAXone   IVPB 2000 IV Intermittent every 24 hours  DAPTOmycin IVPB 500 IV Intermittent every 48 hours  dextrose 50% Injectable 25 IV Push once  dextrose 50% Injectable 12.5 IV Push once  dextrose 50% Injectable 25 IV Push once  dorzolamide 2% Ophthalmic Solution 1 Right EYE every 8 hours  glucagon  Injectable 1 IntraMuscular once  heparin   Injectable 5000 SubCutaneous every 12 hours  influenza  Vaccine (HIGH DOSE) 0.5 IntraMuscular once  insulin lispro (ADMELOG) corrective regimen sliding scale  SubCutaneous three times a day before meals  lactulose Syrup 20 Oral four times a day  megestrol 20 Oral daily  metroNIDAZOLE    Tablet 500 Oral every 12 hours  pantoprazole    Tablet 40 Oral before breakfast  polyethylene glycol 3350 17 Oral daily  prednisoLONE acetate 1% Suspension 1 Right EYE two times a day  sodium bicarbonate 1300 Oral every 8 hours  timolol 0.25% Solution 1 Right EYE two times a day      WEIGHT  Weight (kg): 49.5 (11-25-24 @ 01:02)  Creatinine: 1.8 mg/dL (11-29-24 @ 11:36)      ANTIBIOTICS:  cefTRIAXone   IVPB 2000 milliGRAM(s) IV Intermittent every 24 hours  DAPTOmycin IVPB 500 milliGRAM(s) IV Intermittent every 48 hours  metroNIDAZOLE    Tablet 500 milliGRAM(s) Oral every 12 hours      All available historical records have been reviewed

## 2024-11-29 NOTE — PROGRESS NOTE ADULT - SUBJECTIVE AND OBJECTIVE BOX
SUBJECTIVE/OVERNIGHT EVENTS  Today is hospital day 5d. This morning patient was seen and examined at bedside, resting comfortably in bed. No acute or major events overnight.    MEDICATIONS  STANDING MEDICATIONS  cefTRIAXone   IVPB 2000 milliGRAM(s) IV Intermittent every 24 hours  DAPTOmycin IVPB 500 milliGRAM(s) IV Intermittent every 48 hours  dextrose 50% Injectable 25 Gram(s) IV Push once  dextrose 50% Injectable 12.5 Gram(s) IV Push once  dextrose 50% Injectable 25 Gram(s) IV Push once  dorzolamide 2% Ophthalmic Solution 1 Drop(s) Right EYE every 8 hours  glucagon  Injectable 1 milliGRAM(s) IntraMuscular once  heparin   Injectable 5000 Unit(s) SubCutaneous every 12 hours  influenza  Vaccine (HIGH DOSE) 0.5 milliLiter(s) IntraMuscular once  insulin lispro (ADMELOG) corrective regimen sliding scale   SubCutaneous three times a day before meals  lactulose Syrup 20 Gram(s) Oral four times a day  megestrol 20 milliGRAM(s) Oral daily  metroNIDAZOLE    Tablet 500 milliGRAM(s) Oral every 12 hours  pantoprazole    Tablet 40 milliGRAM(s) Oral before breakfast  polyethylene glycol 3350 17 Gram(s) Oral daily  prednisoLONE acetate 1% Suspension 1 Drop(s) Right EYE two times a day  sodium bicarbonate 1300 milliGRAM(s) Oral every 8 hours  timolol 0.25% Solution 1 Drop(s) Right EYE two times a day    PRN MEDICATIONS  dextrose Oral Gel 15 Gram(s) Oral once PRN  melatonin 3 milliGRAM(s) Oral at bedtime PRN  oxycodone    5 mG/acetaminophen 325 mG 1 Tablet(s) Oral every 6 hours PRN    VITALS  T(F): 97.6 (11-29-24 @ 05:36), Max: 97.6 (11-29-24 @ 05:36)  HR: 96 (11-29-24 @ 05:36) (71 - 96)  BP: 111/72 (11-29-24 @ 05:36) (111/72 - 113/65)  RR: 18 (11-29-24 @ 05:36) (18 - 18)  SpO2: 99% (11-29-24 @ 05:36) (99% - 100%)  POCT Blood Glucose.: 136 mg/dL (11-29-24 @ 12:17)  POCT Blood Glucose.: 143 mg/dL (11-29-24 @ 07:44)  POCT Blood Glucose.: 151 mg/dL (11-28-24 @ 16:55)    PHYSICAL EXAM    GENERAL: NAD, lying in bed comfortably  HEAD:  Atraumatic, normocephalic  EYES: EOMI, PERRLA, conjunctiva and sclera clear  ENT: Moist mucous membranes  NECK: Supple, no JVD  HEART: Regular rate and rhythm, no murmurs, rubs, or gallops  LUNGS: Unlabored respirations.  Clear to auscultation bilaterally, no crackles, wheezing, or rhonchi  ABDOMEN: Soft, nontender, nondistended, +BS  EXTREMITIES: 2+ peripheral pulses bilaterally. No clubbing, cyanosis, or edema  NERVOUS SYSTEM:  A&Ox3, no focal deficits   SKIN: No rashes or lesions    LABS             9.4    13.54 )-----------( 248      ( 11-28-24 @ 14:01 )             28.4     128  |  103  |  23  -------------------------<  145   11-28-24 @ 14:01  3.3  |  16  |  1.6    Ca      7.6     11-28-24 @ 14:01    TPro  7.7  /  Alb  2.2  /  TBili  0.6  /  DBili  x   /  AST  40  /  ALT  19  /  AlkPhos  275  /  GGT  x     11-28-24 @ 14:01        Urinalysis Basic - ( 28 Nov 2024 14:01 )    Color: x / Appearance: x / SG: x / pH: x  Gluc: 145 mg/dL / Ketone: x  / Bili: x / Urobili: x   Blood: x / Protein: x / Nitrite: x   Leuk Esterase: x / RBC: x / WBC x   Sq Epi: x / Non Sq Epi: x / Bacteria: x          Culture - Blood (collected 27 Nov 2024 13:57)  Source: .Blood BLOOD  Preliminary Report (28 Nov 2024 23:01):    No growth at 24 hours      IMAGING

## 2024-11-29 NOTE — PROGRESS NOTE ADULT - ASSESSMENT
ASSESSMENT   68-year-old male with a past medical history of DM, cataracts, neuropathy, and metastatic cholangiocarcinoma (follows at [facility] [place]) presenting for altered mental status. On my exam, patient is AAOx3, he has some abdominal discomfort with mild pain around the drain he has in RUQ.    IMPRESSION  #Abdominal Pain/AMS   #Metastatic Cholangiocarcionma  s/p CBD and hepatic duct stenting s/p cholecystoduodenostomy s/p peritoneal drain     #E coli and VRE faecium bacteremia  - Blood Cx 11/24+ for E coli and VRE faecium  - Blood Cx 11/25 + VRE faecium   - Blood Cx 11/27 NG  - CT Abdomen and Pelvis w/ IV Cont (11.24.24 @ 14:43) > Large abdominal ascites occupying the entire peritoneum. Questionable  reactive changes of the small bowel due to ascites. No bowel obstruction. Drain from the level of the gallbladder to the duodenum with associated metal artifact obscuring evaluation. Biliary duct stents are also noted. Questionable scrotal wall edema. Correlate with physical exam. Mildly nodular liver may be seen with cirrhosis and associated varices as  described.  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    #Abx allergy: No Known Allergies    RECOMMENDATIONS  - appreciate cardiology recs -- presence of varices, risks potentially outweight benefits   - as unable to rule out endocarditis and with presence of port, would plan daptomycin 500 mg q 48 hours for 6 weeks from culture clearance (11/27-1/7)  - continue ceftriaxone 2g daily -- > can switch to Levofloxacin 500 mg q 48 hours PO (end date 12/8)  - conitnue flagyl 500 mg q 12 hours PO (end date 12/8)   - continue Daptomycin 500 mg q 48 hours   -- check CK weekly (last checked 11/27)  - will need to clarify onc course as well (ie. if port is still needed)     Please call or message on [employer] Teams if with any questions.  Spectra [phone]

## 2024-11-29 NOTE — CONSULT NOTE ADULT - SUBJECTIVE AND OBJECTIVE BOX
Outpt cardiologist:    HPI:  Patient is a 68-year-old male with a past medical history of DM, cataracts, neuropathy, and [place] [facility] [place] for altered mental status. On my exam, patient is AAOx3, he has some abdominal discomfort with mild pain around the drain he has in RUQ.    Called daughter Jazlyn López (physician, [phone]) who reports that patient has had short periods of confusion over the last few months, usually caused by infections (cholangitis, SBP...). This morning, patient woke up and was more lethargic than usual, with delayed responses when spoken to. He has been around sick contacts so they decided to bring him in to r/o infection as cause of lethargy. As per daughter, patient has a peritoneal drain, he drains himself 1L of peritoneal fluid daily. Last MR head to check for mets was done 2 months ago and was negative.   No fever/chills, no vomiting, no urinary sx or diarrhea at home.     Vitals: /72, HR 97, T 97, RR 17, SpO2 98% on RA  Labs: WBC 8, Hgb 8, Na 132, Crea 1.8, alk phos 258, lactate 3.9, ammonia 71,   UA negative  RVP negative  Imaging:  - CXR (11.24.24 @ 13:25): Low lung volumes without focal consolidation or pneumothorax.  - CTAP w/ IV Cont (11.24.24 @ 14:43): Large abdominal ascites occupying the entire peritoneum. Questionable reactive changes of the small bowel due to ascites. No bowel obstruction. Drain from the level of the gallbladder to the duodenum with associated metal artifact obscuring evaluation. Biliary duct stents are also noted. Questionable scrotal wall edema. Correlate with physical exam. Mildly nodular liver may be seen with cirrhosis and associated varices as described.  - CT Head No Cont (11.24.24 @ 14:43): Allowing for motion artifact, unremarkable study.    In the ED, patient received Ciprofloxacin 400mg, Flagyl 500mg, LR 1L bolus and was started on lactulose.    (24 Nov 2024 20:15)      ---  cardio fellow additional notes:    Patient noted to have varices    PAST MEDICAL & SURGICAL HISTORY  History of medical problems  dm type 2, bile duct cancer on chemo, cataracts, gerd, neuropathy (crawling sensation)    H/O inguinal hernia repair  right groin        FAMILY HISTORY:  FAMILY HISTORY:      SOCIAL HISTORY:  Social History:      ALLERGIES:  No Known Allergies      MEDICATIONS:  cefTRIAXone   IVPB 2000 milliGRAM(s) IV Intermittent every 24 hours  DAPTOmycin IVPB 500 milliGRAM(s) IV Intermittent every 48 hours  dextrose 50% Injectable 25 Gram(s) IV Push once  dextrose 50% Injectable 12.5 Gram(s) IV Push once  dextrose 50% Injectable 25 Gram(s) IV Push once  dorzolamide 2% Ophthalmic Solution 1 Drop(s) Right EYE every 8 hours  glucagon  Injectable 1 milliGRAM(s) IntraMuscular once  heparin   Injectable 5000 Unit(s) SubCutaneous every 12 hours  influenza  Vaccine (HIGH DOSE) 0.5 milliLiter(s) IntraMuscular once  insulin lispro (ADMELOG) corrective regimen sliding scale   SubCutaneous three times a day before meals  lactulose Syrup 20 Gram(s) Oral four times a day  megestrol 20 milliGRAM(s) Oral daily  metroNIDAZOLE    Tablet 500 milliGRAM(s) Oral every 12 hours  pantoprazole    Tablet 40 milliGRAM(s) Oral before breakfast  polyethylene glycol 3350 17 Gram(s) Oral daily  prednisoLONE acetate 1% Suspension 1 Drop(s) Right EYE two times a day  sodium bicarbonate 1300 milliGRAM(s) Oral every 8 hours  timolol 0.25% Solution 1 Drop(s) Right EYE two times a day    PRN:  dextrose Oral Gel 15 Gram(s) Oral once PRN  melatonin 3 milliGRAM(s) Oral at bedtime PRN  oxycodone    5 mG/acetaminophen 325 mG 1 Tablet(s) Oral every 6 hours PRN      HOME MEDICATIONS:  Home Medications:  dorzolamide 2% ophthalmic solution: 1 drop(s) in each eye 2 times a day to right eye (24 Nov 2024 22:08)  ergocalciferol 1.25 mg (50,000 intl units) oral tablet: orally once a day (24 Nov 2024 22:08)  Farxiga 10 mg oral tablet: 1 tab(s) orally once a day (24 Nov 2024 22:08)  Megace 20 mg oral tablet: 1 tab(s) orally once a day (24 Nov 2024 22:08)  MiraLax oral powder for reconstitution: 17 gram(s) orally once a day (24 Nov 2024 22:08)  Prednisol 1% ophthalmic solution: 1 drop(s) in each affected eye 2 times a day to right eye (24 Nov 2024 22:08)  Protonix 40 mg oral delayed release tablet: 1 tab(s) orally once a day (24 Nov 2024 22:08)  timolol hemihydrate 0.25% ophthalmic solution: 1 drop(s) in each affected eye 2 times a day to right eye (24 Nov 2024 22:08)      VITALS:   T(F): 97.8 (11-29 @ 14:29), Max: 98.3 (11-27 @ 20:04)  HR: 96 (11-29 @ 14:29) (71 - 96)  BP: 113/74 (11-29 @ 14:29) (104/66 - 130/80)  BP(mean): --  RR: 18 (11-29 @ 14:29) (18 - 18)  SpO2: 99% (11-29 @ 14:29) (98% - 100%)    I&O's Summary    28 Nov 2024 07:01  -  29 Nov 2024 07:00  --------------------------------------------------------  IN: 0 mL / OUT: 2350 mL / NET: -2350 mL          PHYSICAL EXAM:  General: Not in distress.    Cardio: regular, S1, S2,   Pulm: B/L BS clear  Abdomen: Soft, non-tender, non-distended  Extremities: No edema b/l le  Neuro: A&O x3. No focal deficits    LABS:                        9.4    13.54 )-----------( 248      ( 28 Nov 2024 14:01 )             28.4     11-29    132[L]  |  106  |  27[H]  ----------------------------<  133[H]  4.6   |  14[L]  |  1.8[H]    Ca    7.5[L]      29 Nov 2024 11:36  Mg     2.0     11-29    TPro  7.2  /  Alb  2.0[L]  /  TBili  0.5  /  DBili  x   /  AST  47[H]  /  ALT  16  /  AlkPhos  243[H]  11-29              Troponin trend:            RADIOLOGY:  -CXR:  -TTE:      Summary:   1. Technically difficult study.   2. Hyperdynamic global left ventricular systolic function.   3. Left ventricular ejection fraction, by visual estimation, is >75%.   4. Normal right ventricular size and function.   5. Sclerotic aortic valve with normal opening.   6. Moderate aortic regurgitation.   7. Moderate thickening of the anterior and posterior mitral valve   leaflets.   8. Mild tricuspid regurgitation.   9. Normal pulmonary artery pressure.    -CCTA:  -STRESS TEST:  -CATHETERIZATION:  -OTHER:  ECG:    Ventricular Rate 97 BPM    Atrial Rate 97 BPM    P-R Interval 134 ms    QRS Duration 70 ms    Q-T Interval 350 ms    QTC Calculation(Bazett) 444 ms    P Axis 57 degrees    R Axis -2 degrees    T Axis -26 degrees    Diagnosis Line Normal sinus rhythm  Left ventricular hypertrophy  T wave abnormality, consider inferior ischemia  Abnormal ECG  Confirmed by Renato Melissa ([id number]) on 11/24/2024 4:45:10 PM

## 2024-11-29 NOTE — CONSULT NOTE ADULT - ASSESSMENT
IMPRESSION  #VRE bacteremia  #Cholangiocarcinoma   #Varices    Plan  - At this time the risks of a KELVIN in the setting of Varices outweigh the benefits  - Pain control  - Plan conveyed to primary team

## 2024-11-29 NOTE — PROGRESS NOTE ADULT - ASSESSMENT
68-year-old male with a past medical history of DM, cataracts, neuropathy, and metastatic cholangiocarcinoma (follows at [facility] [place]) presenting for altered mental status. daughter Jazlyn López (physician, [phone]) who reports that patient has had short periods of confusion over the last few months, usually caused by infections (cholangitis, SBP...). This morning, patient woke up and was more lethargic than usual, with delayed responses when spoken to. He has been around sick contacts so they decided to bring him in to r/o infection as cause of lethargy. As per daughter, patient has a peritoneal drain, he drains himself 1L of peritoneal fluid daily. Last MR head to check for mets was done 2 months ago and was negative.     #Lethargy   #Metastatic cholangiocarcinoma s/p CBD and hepatic duct stenting s/p cholecystoduodenostomy s/p peritoneal drain (currently in place)  #Large abdominal ascites   #Hx of hepatic abscess   * Follows at [facility] in [place] since May 2022 for cholangiocarcinoma  * Follows with Dr Sanchez (Advanced GI) and Dr. Littlejohn (oncologist) at [facility] [place]  * Deemed not a surgical candidate because of extensive metastatic disease  * Pt has a peritoneal drain in place and drains himself 1L of peritoneal fluid daily   * Last MR Head around 2 months ago, no mets  - Vitals on arrival: /72, HR 97, T 97, RR 17, SpO2 98% on RA  - On admission WBC 8, lactate 3.9, t bili 1.1, alk phos 258, ammonia 71  - UA negative, RVP negative  - CXR (11.24.24 @ 13:25): Low lung volumes without focal consolidation or pneumothorax.  - CTAP w/ IV Cont (11.24.24 @ 14:43): Large abdominal ascites occupying the entire peritoneum. Questionable reactive changes of the small bowel due to ascites. No bowel obstruction. Drain from the level of the gallbladder to the duodenum with associated metal artifact obscuring evaluation. Biliary duct stents are also noted. Questionable scrotal wall edema. Correlate with physical exam. Mildly nodular liver may be seen with cirrhosis and associated varices as described.  - CT Head No Cont (11.24.24 @ 14:43): Allowing for motion artifact, unremarkable study.  - s/p Ciprofloxacin 400mg, Flagyl 500mg, LR 1L bolus in the ED  - Blood culture positive for VRE and E coli  bugs.   - Continue ceftriaxone, dapto, and flagyl,  will fu ID   - Lactulose 20g q6 for 2 days  - Monitor BMs  - Palliative care consult appreciated   - **If patient remains lethargic or develops confusion, consider MR Head to r/o metastatic disease. It patient drops in BP, give albumin 100cc +/- IVF***  - 1 L fluid drained fluid studies sent. Does not meet criteria for SBP.   - CK ordered for baseline and trend weekly while on Dapto   - TTE ordered to r/o infective endocarditis per ID recs --> negative for IE. ID recommends KELVIN due to concern, pending cardio consult   - Bcx from 11/27 neg , continue to get daily bcx     #VIPUL  - Creatinine on admission 1.8  - Creatinine 1.2 in June  - s/p 1L LR bolus in ED  - Monitor BMP  - send urine lytes and studies. results reviewed, Na >10 so low likelihood of hepatorenal process     #DM   - On home Farxiga 10mg daily  - ISS for now, target -180  - Monitor FS and adjust accordingly    #Cataract  - Continue home eye drop      #DVT ppx: Heparin  #GI ppx: Pantoprazole  #Diet: CC  #Activity: IAT  #Code: FULL code, confirmed with daughter Jazlyn Aaronkeniajacque (physician, [phone])  #Dispo: Medicine   #Medrec: Confirmed with daughter

## 2024-11-30 LAB
-  CLINDAMYCIN: SIGNIFICANT CHANGE UP
-  ERYTHROMYCIN: SIGNIFICANT CHANGE UP
-  GENTAMICIN: SIGNIFICANT CHANGE UP
-  OXACILLIN: SIGNIFICANT CHANGE UP
-  PENICILLIN: SIGNIFICANT CHANGE UP
-  RIFAMPIN: SIGNIFICANT CHANGE UP
-  TETRACYCLINE: SIGNIFICANT CHANGE UP
-  TRIMETHOPRIM/SULFAMETHOXAZOLE: SIGNIFICANT CHANGE UP
-  VANCOMYCIN: SIGNIFICANT CHANGE UP
ALBUMIN SERPL ELPH-MCNC: 1.9 G/DL — LOW (ref 3.5–5.2)
ALP SERPL-CCNC: 223 U/L — HIGH (ref 30–115)
ALT FLD-CCNC: 16 U/L — SIGNIFICANT CHANGE UP (ref 0–41)
ANION GAP SERPL CALC-SCNC: 11 MMOL/L — SIGNIFICANT CHANGE UP (ref 7–14)
AST SERPL-CCNC: 34 U/L — SIGNIFICANT CHANGE UP (ref 0–41)
BASOPHILS # BLD AUTO: 0.12 K/UL — SIGNIFICANT CHANGE UP (ref 0–0.2)
BASOPHILS NFR BLD AUTO: 0.9 % — SIGNIFICANT CHANGE UP (ref 0–1)
BILIRUB SERPL-MCNC: 0.4 MG/DL — SIGNIFICANT CHANGE UP (ref 0.2–1.2)
BUN SERPL-MCNC: 33 MG/DL — HIGH (ref 10–20)
CALCIUM SERPL-MCNC: 7.5 MG/DL — LOW (ref 8.4–10.5)
CHLORIDE SERPL-SCNC: 105 MMOL/L — SIGNIFICANT CHANGE UP (ref 98–110)
CO2 SERPL-SCNC: 16 MMOL/L — LOW (ref 17–32)
CREAT SERPL-MCNC: 2.3 MG/DL — HIGH (ref 0.7–1.5)
CULTURE RESULTS: ABNORMAL
EGFR: 30 ML/MIN/1.73M2 — LOW
EOSINOPHIL # BLD AUTO: 1 K/UL — HIGH (ref 0–0.7)
EOSINOPHIL NFR BLD AUTO: 7.4 % — SIGNIFICANT CHANGE UP (ref 0–8)
GLUCOSE BLDC GLUCOMTR-MCNC: 136 MG/DL — HIGH (ref 70–99)
GLUCOSE BLDC GLUCOMTR-MCNC: 172 MG/DL — HIGH (ref 70–99)
GLUCOSE BLDC GLUCOMTR-MCNC: 206 MG/DL — HIGH (ref 70–99)
GLUCOSE BLDC GLUCOMTR-MCNC: 256 MG/DL — HIGH (ref 70–99)
GLUCOSE SERPL-MCNC: 187 MG/DL — HIGH (ref 70–99)
GRAM STN FLD: ABNORMAL
HCT VFR BLD CALC: 29 % — LOW (ref 42–52)
HGB BLD-MCNC: 9.2 G/DL — LOW (ref 14–18)
IMM GRANULOCYTES NFR BLD AUTO: 0.5 % — HIGH (ref 0.1–0.3)
LYMPHOCYTES # BLD AUTO: 19.6 % — LOW (ref 20.5–51.1)
LYMPHOCYTES # BLD AUTO: 2.64 K/UL — SIGNIFICANT CHANGE UP (ref 1.2–3.4)
MCHC RBC-ENTMCNC: 27.9 PG — SIGNIFICANT CHANGE UP (ref 27–31)
MCHC RBC-ENTMCNC: 31.7 G/DL — LOW (ref 32–37)
MCV RBC AUTO: 87.9 FL — SIGNIFICANT CHANGE UP (ref 80–94)
METHOD TYPE: SIGNIFICANT CHANGE UP
MONOCYTES # BLD AUTO: 1.54 K/UL — HIGH (ref 0.1–0.6)
MONOCYTES NFR BLD AUTO: 11.4 % — HIGH (ref 1.7–9.3)
NEUTROPHILS # BLD AUTO: 8.09 K/UL — HIGH (ref 1.4–6.5)
NEUTROPHILS NFR BLD AUTO: 60.2 % — SIGNIFICANT CHANGE UP (ref 42.2–75.2)
NRBC # BLD: 0 /100 WBCS — SIGNIFICANT CHANGE UP (ref 0–0)
ORGANISM # SPEC MICROSCOPIC CNT: ABNORMAL
ORGANISM # SPEC MICROSCOPIC CNT: SIGNIFICANT CHANGE UP
PLATELET # BLD AUTO: 143 K/UL — SIGNIFICANT CHANGE UP (ref 130–400)
PMV BLD: SIGNIFICANT CHANGE UP (ref 7.4–10.4)
POTASSIUM SERPL-MCNC: 5.1 MMOL/L — HIGH (ref 3.5–5)
POTASSIUM SERPL-SCNC: 5.1 MMOL/L — HIGH (ref 3.5–5)
PROT SERPL-MCNC: 7.1 G/DL — SIGNIFICANT CHANGE UP (ref 6–8)
RBC # BLD: 3.3 M/UL — LOW (ref 4.7–6.1)
RBC # FLD: 15.9 % — HIGH (ref 11.5–14.5)
SODIUM SERPL-SCNC: 132 MMOL/L — LOW (ref 135–146)
SPECIMEN SOURCE: SIGNIFICANT CHANGE UP
WBC # BLD: 13.46 K/UL — HIGH (ref 4.8–10.8)
WBC # FLD AUTO: 13.46 K/UL — HIGH (ref 4.8–10.8)

## 2024-11-30 PROCEDURE — 76770 US EXAM ABDO BACK WALL COMP: CPT | Mod: 26

## 2024-11-30 PROCEDURE — 99232 SBSQ HOSP IP/OBS MODERATE 35: CPT

## 2024-11-30 RX ORDER — SODIUM ZIRCONIUM CYCLOSILICATE 5 G/5G
5 POWDER, FOR SUSPENSION ORAL EVERY 8 HOURS
Refills: 0 | Status: COMPLETED | OUTPATIENT
Start: 2024-11-30 | End: 2024-11-30

## 2024-11-30 RX ORDER — SODIUM BICARBONATE 84 MG/ML
1300 INJECTION, SOLUTION INTRAVENOUS EVERY 8 HOURS
Refills: 0 | Status: DISCONTINUED | OUTPATIENT
Start: 2024-11-30 | End: 2024-12-03

## 2024-11-30 RX ORDER — 0.9 % SODIUM CHLORIDE 0.9 %
1000 INTRAVENOUS SOLUTION INTRAVENOUS
Refills: 0 | Status: DISCONTINUED | OUTPATIENT
Start: 2024-11-30 | End: 2024-12-02

## 2024-11-30 RX ORDER — CHLORHEXIDINE GLUCONATE 1.2 MG/ML
1 RINSE ORAL
Refills: 0 | Status: DISCONTINUED | OUTPATIENT
Start: 2024-11-30 | End: 2024-12-04

## 2024-11-30 RX ORDER — LACTULOSE 10 G/15ML
20 SOLUTION ORAL DAILY
Refills: 0 | Status: DISCONTINUED | OUTPATIENT
Start: 2024-11-30 | End: 2024-12-04

## 2024-11-30 RX ORDER — OXYCODONE HYDROCHLORIDE 30 MG/1
5 TABLET ORAL EVERY 6 HOURS
Refills: 0 | Status: DISCONTINUED | OUTPATIENT
Start: 2024-11-30 | End: 2024-12-04

## 2024-11-30 RX ADMIN — OXYCODONE HYDROCHLORIDE 5 MILLIGRAM(S): 30 TABLET ORAL at 16:40

## 2024-11-30 RX ADMIN — Medication 75 MILLILITER(S): at 12:53

## 2024-11-30 RX ADMIN — Medication 1 DROP(S): at 19:03

## 2024-11-30 RX ADMIN — METRONIDAZOLE 500 MILLIGRAM(S): 500 TABLET ORAL at 05:29

## 2024-11-30 RX ADMIN — SODIUM ZIRCONIUM CYCLOSILICATE 5 GRAM(S): 5 POWDER, FOR SUSPENSION ORAL at 11:00

## 2024-11-30 RX ADMIN — Medication 75 MILLILITER(S): at 19:52

## 2024-11-30 RX ADMIN — Medication 1 DROP(S): at 05:35

## 2024-11-30 RX ADMIN — DORZOLAMIDE HYDROCHLORIDE 1 DROP(S): 20 SOLUTION OPHTHALMIC at 05:35

## 2024-11-30 RX ADMIN — POLYETHYLENE GLYCOL 3350 17 GRAM(S): 17 POWDER, FOR SOLUTION ORAL at 12:45

## 2024-11-30 RX ADMIN — PANTOPRAZOLE SODIUM 40 MILLIGRAM(S): 40 TABLET, DELAYED RELEASE ORAL at 05:29

## 2024-11-30 RX ADMIN — LACTULOSE 20 GRAM(S): 10 SOLUTION ORAL at 12:45

## 2024-11-30 RX ADMIN — PREDNISOLONE ACETATE 1 DROP(S): 1.2 SUSPENSION/ DROPS OPHTHALMIC at 05:35

## 2024-11-30 RX ADMIN — Medication 5000 UNIT(S): at 05:29

## 2024-11-30 RX ADMIN — MEGESTROL ACETATE 20 MILLIGRAM(S): 40 SUSPENSION ORAL at 12:45

## 2024-11-30 RX ADMIN — Medication 5000 UNIT(S): at 18:58

## 2024-11-30 RX ADMIN — LACTULOSE 20 GRAM(S): 10 SOLUTION ORAL at 00:00

## 2024-11-30 RX ADMIN — Medication 3: at 17:20

## 2024-11-30 RX ADMIN — LACTULOSE 20 GRAM(S): 10 SOLUTION ORAL at 05:29

## 2024-11-30 RX ADMIN — SODIUM BICARBONATE 1300 MILLIGRAM(S): 84 INJECTION, SOLUTION INTRAVENOUS at 15:04

## 2024-11-30 RX ADMIN — DORZOLAMIDE HYDROCHLORIDE 1 DROP(S): 20 SOLUTION OPHTHALMIC at 15:06

## 2024-11-30 RX ADMIN — PREDNISOLONE ACETATE 1 DROP(S): 1.2 SUSPENSION/ DROPS OPHTHALMIC at 18:58

## 2024-11-30 RX ADMIN — SODIUM BICARBONATE 1300 MILLIGRAM(S): 84 INJECTION, SOLUTION INTRAVENOUS at 21:40

## 2024-11-30 RX ADMIN — Medication 100 MILLIGRAM(S): at 05:30

## 2024-11-30 RX ADMIN — SODIUM BICARBONATE 1300 MILLIGRAM(S): 84 INJECTION, SOLUTION INTRAVENOUS at 05:29

## 2024-11-30 RX ADMIN — Medication 2: at 08:00

## 2024-11-30 RX ADMIN — CHLORHEXIDINE GLUCONATE 1 APPLICATION(S): 1.2 RINSE ORAL at 15:05

## 2024-11-30 RX ADMIN — METRONIDAZOLE 500 MILLIGRAM(S): 500 TABLET ORAL at 18:57

## 2024-11-30 RX ADMIN — SODIUM ZIRCONIUM CYCLOSILICATE 5 GRAM(S): 5 POWDER, FOR SUSPENSION ORAL at 15:04

## 2024-11-30 RX ADMIN — DORZOLAMIDE HYDROCHLORIDE 1 DROP(S): 20 SOLUTION OPHTHALMIC at 21:43

## 2024-11-30 NOTE — PROGRESS NOTE ADULT - SUBJECTIVE AND OBJECTIVE BOX
Pt seen and examined at bedside. Doing well. No complaints  No SOB, CP.       VITAL SIGNS (Last 24 hrs):  T(C): 36.9 (11-30-24 @ 13:44), Max: 37.2 (11-30-24 @ 04:35)  HR: 97 (11-30-24 @ 13:44) (79 - 97)  BP: 102/64 (11-30-24 @ 13:44) (102/64 - 110/68)  RR: 18 (11-30-24 @ 13:44) (18 - 18)  SpO2: 100% (11-30-24 @ 13:44) (100% - 100%)  Wt(kg): --  Daily     Daily     I&O's Summary    29 Nov 2024 07:01  -  30 Nov 2024 07:00  --------------------------------------------------------  IN: 0 mL / OUT: 1350 mL / NET: -1350 mL        PHYSICAL EXAM:  GENERAL: NAD, cachectic  HEAD:  Atraumatic, Normocephalic  EYES:  conjunctiva and sclera clear  NECK: Supple, No JVD  CHEST/LUNG: Clear to auscultation bilaterally; No wheeze  HEART: Regular rate and rhythm; No murmurs, rubs, or gallops  ABDOMEN: Soft, Nontender, Nondistended + tunneled peritoneal catheter  EXTREMITIES:  2+ Peripheral Pulses, No clubbing, cyanosis, or edema  PSYCH: AAOx3  NEUROLOGY: non-focal  SKIN: No rashes or lesions    Labs Reviewed  Spoke to patient in regards to abnormal labs.    CBC Full  -  ( 30 Nov 2024 09:00 )  WBC Count : 13.46 K/uL  Hemoglobin : 9.2 g/dL  Hematocrit : 29.0 %  Platelet Count - Automated : 143 K/uL  Mean Cell Volume : 87.9 fL  Mean Cell Hemoglobin : 27.9 pg  Mean Cell Hemoglobin Concentration : 31.7 g/dL  Auto Neutrophil # : 8.09 K/uL  Auto Lymphocyte # : 2.64 K/uL  Auto Monocyte # : 1.54 K/uL  Auto Eosinophil # : 1.00 K/uL  Auto Basophil # : 0.12 K/uL  Auto Neutrophil % : 60.2 %  Auto Lymphocyte % : 19.6 %  Auto Monocyte % : 11.4 %  Auto Eosinophil % : 7.4 %  Auto Basophil % : 0.9 %    BMP:    11-30 @ 08:32    Blood Urea Nitrogen - 33  Calcium - 7.5  Carbond Dioxide - 16  Chloride - 105  Creatinine - 2.3  Glucose - 187  Potassium - 5.1  Sodium - 132      Hemoglobin A1c -     Urine Culture:  11-28 @ 14:01 Urine culture: --    Culture Results:   No growth at 24 hours  Method Type: --  Organism: --  Organism Identification: --  Specimen Source: .Blood BLOOD  11-27 @ 13:57 Urine culture: --    Culture Results:   No growth at 48 Hours  Method Type: --  Organism: --  Organism Identification: --  Specimen Source: .Blood BLOOD  11-25 @ 16:00 Urine culture: --    Culture Results:   Growth in aerobic and anaerobic bottles: Enterococcus faecium (vancomycin  resistant)  See previous culture [id number]  Method Type: --  Organism: --  Organism Identification: --  Specimen Source: .Blood BLOOD            MEDICATIONS  (STANDING):  cefTRIAXone   IVPB 2000 milliGRAM(s) IV Intermittent every 24 hours  chlorhexidine 2% Cloths 1 Application(s) Topical <User Schedule>  DAPTOmycin IVPB 500 milliGRAM(s) IV Intermittent every 48 hours  dextrose 5% 1000 milliLiter(s) (75 mL/Hr) IV Continuous <Continuous>  dextrose 50% Injectable 25 Gram(s) IV Push once  dextrose 50% Injectable 12.5 Gram(s) IV Push once  dextrose 50% Injectable 25 Gram(s) IV Push once  dorzolamide 2% Ophthalmic Solution 1 Drop(s) Right EYE every 8 hours  glucagon  Injectable 1 milliGRAM(s) IntraMuscular once  heparin   Injectable 5000 Unit(s) SubCutaneous every 12 hours  influenza  Vaccine (HIGH DOSE) 0.5 milliLiter(s) IntraMuscular once  insulin lispro (ADMELOG) corrective regimen sliding scale   SubCutaneous three times a day before meals  lactulose Syrup 20 Gram(s) Oral daily  megestrol 20 milliGRAM(s) Oral daily  metroNIDAZOLE    Tablet 500 milliGRAM(s) Oral every 12 hours  pantoprazole    Tablet 40 milliGRAM(s) Oral before breakfast  polyethylene glycol 3350 17 Gram(s) Oral daily  prednisoLONE acetate 1% Suspension 1 Drop(s) Right EYE two times a day  sodium bicarbonate 1300 milliGRAM(s) Oral every 8 hours  timolol 0.25% Solution 1 Drop(s) Right EYE two times a day    MEDICATIONS  (PRN):  dextrose Oral Gel 15 Gram(s) Oral once PRN Blood Glucose LESS THAN 70 milliGRAM(s)/deciliter  melatonin 3 milliGRAM(s) Oral at bedtime PRN Insomnia  oxyCODONE    IR 5 milliGRAM(s) Oral every 6 hours PRN Moderate Pain (4 - 6)

## 2024-11-30 NOTE — PROGRESS NOTE ADULT - ASSESSMENT
68-year-old male with a past medical history of DM, cataracts, neuropathy, and metastatic cholangiocarcinoma (follows at [facility] [place]) presenting for altered mental status. daughter Jazlyn López (physician, [phone]) who reports that patient has had short periods of confusion over the last few months, usually caused by infections (cholangitis, SBP...). This morning, patient woke up and was more lethargic than usual, with delayed responses when spoken to. He has been around sick contacts so they decided to bring him in to r/o infection as cause of lethargy. As per daughter, patient has a peritoneal drain, he drains himself 1L of peritoneal fluid daily. Last MR head to check for mets was done 2 months ago and was negative.       #Metabolic encephalopathy   #VRE and E. coli Bacteremia   #Metastatic cholangiocarcinoma s/p CBD and hepatic duct stenting s/p cholecystoduodenostomy    #Malignant ascites s/p tunneled peritoneal catheter   #Hx of hepatic abscess   - Vitals on arrival: /72, HR 97, T 97, RR 17, SpO2 98% on RA  - On admission WBC 8, lactate 3.9, t bili 1.1, alk phos 258, ammonia 71  - UA negative, RVP negative  - CXR (11.24.24 @ 13:25): Low lung volumes without focal consolidation or pneumothorax.  - CTAP w/ IV Cont (11.24.24 @ 14:43): Large abdominal ascites occupying the entire peritoneum. Questionable reactive changes of the small bowel due to ascites. No bowel obstruction. Drain from the level of the gallbladder to the duodenum with associated metal artifact obscuring evaluation. Biliary duct stents are also noted. Questionable scrotal wall edema. Correlate with physical exam. Mildly nodular liver may be seen with cirrhosis and associated varices as described.  - CT Head No Cont (11.24.24 @ 14:43): Allowing for motion artifact, unremarkable study.  - Blood culture positive for VRE and E coli    - Ascitic studies not consistent with SBP   - Continue ceftriaxone and flagyl   - Continue Dapto (renally dosed)   - CK weekly while on Dapto   - TTE negative   - Cardio consulted for KELVIN - appreciate cardiology recs -- presence of varices, risks potentially outweigh benefits   - Per id, as unable to rule out endocarditis and with presence of port, would plan daptomycin 500 mg q 48 hours for 6 weeks from culture clearance (11/27-1/7). continue ceftriaxone 2g daily -- > can switch to Levofloxacin 500 mg q 48 hours PO (end date 12/8). continue flagyl 500 mg q 12 hours PO (end date 12/8)   - Remove port if not needed by oncology   - Follow up repeat Bcx until negative     #VIPUL on CKD3  #Metabolic acidosis   - start bicarb gtt  - c/w sodium bicarbonate   - nephro eval     #DM   - On home Farxiga 10mg daily  - ISS for now, target -180  - Monitor FS and adjust accordingly      #DVT ppx: Heparin  #GI ppx: Pantoprazole      Pending: negative cultures, VIPUL, nephro eval, IV Abx set up

## 2024-12-01 LAB
ALBUMIN SERPL ELPH-MCNC: 1.8 G/DL — LOW (ref 3.5–5.2)
ALP SERPL-CCNC: 201 U/L — HIGH (ref 30–115)
ALT FLD-CCNC: 13 U/L — SIGNIFICANT CHANGE UP (ref 0–41)
ANION GAP SERPL CALC-SCNC: 11 MMOL/L — SIGNIFICANT CHANGE UP (ref 7–14)
AST SERPL-CCNC: 33 U/L — SIGNIFICANT CHANGE UP (ref 0–41)
BASOPHILS # BLD AUTO: 0.11 K/UL — SIGNIFICANT CHANGE UP (ref 0–0.2)
BASOPHILS NFR BLD AUTO: 0.7 % — SIGNIFICANT CHANGE UP (ref 0–1)
BILIRUB SERPL-MCNC: 0.4 MG/DL — SIGNIFICANT CHANGE UP (ref 0.2–1.2)
BUN SERPL-MCNC: 37 MG/DL — HIGH (ref 10–20)
CALCIUM SERPL-MCNC: 7.2 MG/DL — LOW (ref 8.4–10.5)
CHLORIDE SERPL-SCNC: 97 MMOL/L — LOW (ref 98–110)
CO2 SERPL-SCNC: 19 MMOL/L — SIGNIFICANT CHANGE UP (ref 17–32)
CREAT SERPL-MCNC: 2.8 MG/DL — HIGH (ref 0.7–1.5)
EGFR: 24 ML/MIN/1.73M2 — LOW
EOSINOPHIL # BLD AUTO: 0.95 K/UL — HIGH (ref 0–0.7)
EOSINOPHIL NFR BLD AUTO: 6.4 % — SIGNIFICANT CHANGE UP (ref 0–8)
GLUCOSE BLDC GLUCOMTR-MCNC: 151 MG/DL — HIGH (ref 70–99)
GLUCOSE BLDC GLUCOMTR-MCNC: 192 MG/DL — HIGH (ref 70–99)
GLUCOSE BLDC GLUCOMTR-MCNC: 211 MG/DL — HIGH (ref 70–99)
GLUCOSE BLDC GLUCOMTR-MCNC: 223 MG/DL — HIGH (ref 70–99)
GLUCOSE BLDC GLUCOMTR-MCNC: 244 MG/DL — HIGH (ref 70–99)
GLUCOSE SERPL-MCNC: 180 MG/DL — HIGH (ref 70–99)
HCT VFR BLD CALC: 24.2 % — LOW (ref 42–52)
HGB BLD-MCNC: 7.9 G/DL — LOW (ref 14–18)
IMM GRANULOCYTES NFR BLD AUTO: 0.5 % — HIGH (ref 0.1–0.3)
LYMPHOCYTES # BLD AUTO: 16.8 % — LOW (ref 20.5–51.1)
LYMPHOCYTES # BLD AUTO: 2.5 K/UL — SIGNIFICANT CHANGE UP (ref 1.2–3.4)
MAGNESIUM SERPL-MCNC: 2.1 MG/DL — SIGNIFICANT CHANGE UP (ref 1.8–2.4)
MCHC RBC-ENTMCNC: 28.4 PG — SIGNIFICANT CHANGE UP (ref 27–31)
MCHC RBC-ENTMCNC: 32.6 G/DL — SIGNIFICANT CHANGE UP (ref 32–37)
MCV RBC AUTO: 87.1 FL — SIGNIFICANT CHANGE UP (ref 80–94)
MONOCYTES # BLD AUTO: 1.73 K/UL — HIGH (ref 0.1–0.6)
MONOCYTES NFR BLD AUTO: 11.6 % — HIGH (ref 1.7–9.3)
NEUTROPHILS # BLD AUTO: 9.53 K/UL — HIGH (ref 1.4–6.5)
NEUTROPHILS NFR BLD AUTO: 64 % — SIGNIFICANT CHANGE UP (ref 42.2–75.2)
NRBC # BLD: 0 /100 WBCS — SIGNIFICANT CHANGE UP (ref 0–0)
PHOSPHATE SERPL-MCNC: 2.8 MG/DL — SIGNIFICANT CHANGE UP (ref 2.1–4.9)
PLATELET # BLD AUTO: 95 K/UL — LOW (ref 130–400)
PMV BLD: 9.3 FL — SIGNIFICANT CHANGE UP (ref 7.4–10.4)
POTASSIUM SERPL-MCNC: 4.5 MMOL/L — SIGNIFICANT CHANGE UP (ref 3.5–5)
POTASSIUM SERPL-SCNC: 4.5 MMOL/L — SIGNIFICANT CHANGE UP (ref 3.5–5)
PROT SERPL-MCNC: 6.5 G/DL — SIGNIFICANT CHANGE UP (ref 6–8)
RBC # BLD: 2.78 M/UL — LOW (ref 4.7–6.1)
RBC # FLD: 15.9 % — HIGH (ref 11.5–14.5)
SODIUM SERPL-SCNC: 127 MMOL/L — LOW (ref 135–146)
WBC # BLD: 14.9 K/UL — HIGH (ref 4.8–10.8)
WBC # FLD AUTO: 14.9 K/UL — HIGH (ref 4.8–10.8)

## 2024-12-01 PROCEDURE — 99232 SBSQ HOSP IP/OBS MODERATE 35: CPT

## 2024-12-01 PROCEDURE — 93970 EXTREMITY STUDY: CPT | Mod: 26

## 2024-12-01 RX ORDER — 0.9 % SODIUM CHLORIDE 0.9 %
1000 INTRAVENOUS SOLUTION INTRAVENOUS ONCE
Refills: 0 | Status: COMPLETED | OUTPATIENT
Start: 2024-12-01 | End: 2024-12-01

## 2024-12-01 RX ADMIN — METRONIDAZOLE 500 MILLIGRAM(S): 500 TABLET ORAL at 18:18

## 2024-12-01 RX ADMIN — Medication 1 DROP(S): at 18:20

## 2024-12-01 RX ADMIN — Medication 50 MILLILITER(S): at 18:18

## 2024-12-01 RX ADMIN — Medication 75 MILLILITER(S): at 19:07

## 2024-12-01 RX ADMIN — PREDNISOLONE ACETATE 1 DROP(S): 1.2 SUSPENSION/ DROPS OPHTHALMIC at 05:09

## 2024-12-01 RX ADMIN — Medication 2: at 08:07

## 2024-12-01 RX ADMIN — Medication 5000 UNIT(S): at 05:05

## 2024-12-01 RX ADMIN — CHLORHEXIDINE GLUCONATE 1 APPLICATION(S): 1.2 RINSE ORAL at 05:06

## 2024-12-01 RX ADMIN — LACTULOSE 20 GRAM(S): 10 SOLUTION ORAL at 14:02

## 2024-12-01 RX ADMIN — Medication 1: at 13:51

## 2024-12-01 RX ADMIN — Medication 1 DROP(S): at 05:10

## 2024-12-01 RX ADMIN — PREDNISOLONE ACETATE 1 DROP(S): 1.2 SUSPENSION/ DROPS OPHTHALMIC at 18:20

## 2024-12-01 RX ADMIN — OXYCODONE HYDROCHLORIDE 5 MILLIGRAM(S): 30 TABLET ORAL at 19:00

## 2024-12-01 RX ADMIN — DORZOLAMIDE HYDROCHLORIDE 1 DROP(S): 20 SOLUTION OPHTHALMIC at 05:09

## 2024-12-01 RX ADMIN — DORZOLAMIDE HYDROCHLORIDE 1 DROP(S): 20 SOLUTION OPHTHALMIC at 21:38

## 2024-12-01 RX ADMIN — SODIUM BICARBONATE 1300 MILLIGRAM(S): 84 INJECTION, SOLUTION INTRAVENOUS at 14:03

## 2024-12-01 RX ADMIN — DAPTOMYCIN 120 MILLIGRAM(S): 500 INJECTION, POWDER, LYOPHILIZED, FOR SOLUTION INTRAVENOUS at 22:15

## 2024-12-01 RX ADMIN — POLYETHYLENE GLYCOL 3350 17 GRAM(S): 17 POWDER, FOR SOLUTION ORAL at 14:02

## 2024-12-01 RX ADMIN — Medication 1000 MILLILITER(S): at 14:16

## 2024-12-01 RX ADMIN — SODIUM BICARBONATE 1300 MILLIGRAM(S): 84 INJECTION, SOLUTION INTRAVENOUS at 21:40

## 2024-12-01 RX ADMIN — DORZOLAMIDE HYDROCHLORIDE 1 DROP(S): 20 SOLUTION OPHTHALMIC at 14:03

## 2024-12-01 RX ADMIN — Medication 100 MILLIGRAM(S): at 13:54

## 2024-12-01 RX ADMIN — OXYCODONE HYDROCHLORIDE 5 MILLIGRAM(S): 30 TABLET ORAL at 18:24

## 2024-12-01 RX ADMIN — SODIUM BICARBONATE 1300 MILLIGRAM(S): 84 INJECTION, SOLUTION INTRAVENOUS at 05:06

## 2024-12-01 RX ADMIN — Medication 1: at 17:25

## 2024-12-01 RX ADMIN — PANTOPRAZOLE SODIUM 40 MILLIGRAM(S): 40 TABLET, DELAYED RELEASE ORAL at 05:06

## 2024-12-01 RX ADMIN — METRONIDAZOLE 500 MILLIGRAM(S): 500 TABLET ORAL at 05:06

## 2024-12-01 NOTE — CONSULT NOTE ADULT - ASSESSMENT
Patient is a 68-year-old male with a past medical history of DM, cataracts, neuropathy, and metastatic cholangiocarcinoma (follows at [facility]) presenting for altered mental status    # VIPUL prerenal vs ATN   # hyponatremia  # metastatic cholangioKc  # anemia chronic   # VRE and E fecalis bacteremia     - suggest IVF LR at 100 cc per hour  - strict I and O  - on rocephin dapto and flagyl as per ID check CK levels   - continue sodium bicarb po  - U lytes noted c/w prerenal hypovolemia component   - BMP tonight and in AM   - no need for RRT    will follow

## 2024-12-01 NOTE — PROGRESS NOTE ADULT - ASSESSMENT
68-year-old male with a past medical history of DM, cataracts, neuropathy, and metastatic cholangiocarcinoma (follows at [facility] [place]) presenting for altered mental status. daughter Jazlyn López (physician, [phone]) who reports that patient has had short periods of confusion over the last few months, usually caused by infections (cholangitis, SBP...). This morning, patient woke up and was more lethargic than usual, with delayed responses when spoken to. He has been around sick contacts so they decided to bring him in to r/o infection as cause of lethargy. As per daughter, patient has a peritoneal drain, he drains himself 1L of peritoneal fluid daily. Last MR head to check for mets was done 2 months ago and was negative.       #Metabolic encephalopathy   #VRE and E. coli Bacteremia   #Metastatic cholangiocarcinoma s/p CBD and hepatic duct stenting s/p cholecystoduodenostomy    #Malignant ascites s/p tunneled peritoneal catheter   #Hx of hepatic abscess   - Vitals on arrival: /72, HR 97, T 97, RR 17, SpO2 98% on RA  - On admission WBC 8, lactate 3.9, t bili 1.1, alk phos 258, ammonia 71  - UA negative, RVP negative  - CXR (11.24.24 @ 13:25): Low lung volumes without focal consolidation or pneumothorax.  - CTAP w/ IV Cont (11.24.24 @ 14:43): Large abdominal ascites occupying the entire peritoneum. Questionable reactive changes of the small bowel due to ascites. No bowel obstruction. Drain from the level of the gallbladder to the duodenum with associated metal artifact obscuring evaluation. Biliary duct stents are also noted. Questionable scrotal wall edema. Correlate with physical exam. Mildly nodular liver may be seen with cirrhosis and associated varices as described.  - CT Head No Cont (11.24.24 @ 14:43): Allowing for motion artifact, unremarkable study.  - Blood culture positive for VRE and E coli    - Ascitic studies not consistent with SBP   - Continue ceftriaxone and flagyl   - Continue Dapto (renally dosed)   - CK weekly while on Dapto   - TTE negative   - Cardio consulted for KELVIN - appreciate cardiology recs -- presence of varices, risks potentially outweigh benefits   - Per id, as unable to rule out endocarditis and with presence of port, would plan daptomycin 500 mg q 48 hours for 6 weeks from culture clearance (11/27-1/7). continue ceftriaxone 2g daily -- > can switch to Levofloxacin 500 mg q 48 hours PO (end date 12/8). continue flagyl 500 mg q 12 hours PO (end date 12/8)   - Remove port if not needed by oncology   - Follow up repeat Bcx until negative     #VIPUL on CKD3  #Metabolic acidosis   - f/u urine studies   - RBUS negative   - c/w bicarb gtt  - c/w sodium bicarbonate   - nephro eval pending    #Thrombocytopenia   - 4T score intermediate   - hold heparin for now   - can't use fondaparinux due to renal dysfunction   - check coags, d-dimer, fibrinogen     #DM   - On home Farxiga 10mg daily  - ISS for now, target -180  - Monitor FS and adjust accordingly      #DVT ppx: SCD  #GI ppx: Pantoprazole      Pending: VIPUL, Thrombocytopenia

## 2024-12-01 NOTE — PROGRESS NOTE ADULT - SUBJECTIVE AND OBJECTIVE BOX
Pt seen and examined at bedside. Denies any complaints. Denies SOB, CP.           VITAL SIGNS (Last 24 hrs):  T(C): 37 (12-01-24 @ 05:16), Max: 37 (12-01-24 @ 05:16)  HR: 104 (12-01-24 @ 05:16) (102 - 104)  BP: 99/61 (12-01-24 @ 05:16) (99/61 - 101/58)  RR: 18 (12-01-24 @ 05:16) (18 - 18)  SpO2: 100% (12-01-24 @ 05:16) (100% - 100%)          I&O's Summary    30 Nov 2024 07:01  -  01 Dec 2024 07:00  -------------------------------------------------   IN: 0 mL / OUT: 800 mL / NET: -800 mL        PHYSICAL EXAM:  GENERAL: NAD   HEAD:  Atraumatic, Normocephalic  EYES:   conjunctiva and sclera clear  NECK: Supple, No JVD  CHEST/LUNG: Clear to auscultation bilaterally; No wheeze  HEART: Regular rate and rhythm; No murmurs, rubs, or gallops  ABDOMEN: Soft, Nontender, Nondistended + tunneled peritoneal catheter   EXTREMITIES:  2+ Peripheral Pulses, No clubbing, cyanosis, or edema  PSYCH: AAOx3  NEUROLOGY: non-focal  SKIN: No rashes or lesions    Labs Reviewed  Spoke to patient in regards to abnormal labs.    CBC Full  -  ( 01 Dec 2024 09:38 )  WBC Count : 14.90 K/uL  Hemoglobin : 7.9 g/dL  Hematocrit : 24.2 %  Platelet Count - Automated : 95 K/uL  Mean Cell Volume : 87.1 fL  Mean Cell Hemoglobin : 28.4 pg  Mean Cell Hemoglobin Concentration : 32.6 g/dL  Auto Neutrophil # : 9.53 K/uL  Auto Lymphocyte # : 2.50 K/uL  Auto Monocyte # : 1.73 K/uL  Auto Eosinophil # : 0.95 K/uL  Auto Basophil # : 0.11 K/uL  Auto Neutrophil % : 64.0 %  Auto Lymphocyte % : 16.8 %  Auto Monocyte % : 11.6 %  Auto Eosinophil % : 6.4 %  Auto Basophil % : 0.7 %    BMP:    12-01 @ 09:38    Blood Urea Nitrogen - 37  Calcium - 7.2  Carbond Dioxide - 19  Chloride - 97  Creatinine - 2.8  Glucose - 180  Potassium - 4.5  Sodium - 127      Hemoglobin A1c -     Urine Culture:  11-29 @ 11:36 Urine culture: --    Culture Results:   No growth at 24 hours  Method Type: --  Organism: --  Organism Identification: --  Specimen Source: .Blood BLOOD  11-28 @ 14:01 Urine culture: --    Culture Results:   No growth at 48 Hours  Method Type: --  Organism: --  Organism Identification: --  Specimen Source: .Blood BLOOD  11-27 @ 13:57 Urine culture: --    Culture Results:   No growth at 72 Hours  Method Type: --  Organism: --  Organism Identification: --  Specimen Source: .Blood BLOOD            MEDICATIONS  (STANDING):  cefTRIAXone   IVPB 2000 milliGRAM(s) IV Intermittent every 24 hours  chlorhexidine 2% Cloths 1 Application(s) Topical <User Schedule>  DAPTOmycin IVPB 500 milliGRAM(s) IV Intermittent every 48 hours  dextrose 5% 1000 milliLiter(s) (75 mL/Hr) IV Continuous <Continuous>  dextrose 50% Injectable 25 Gram(s) IV Push once  dextrose 50% Injectable 12.5 Gram(s) IV Push once  dextrose 50% Injectable 25 Gram(s) IV Push once  dorzolamide 2% Ophthalmic Solution 1 Drop(s) Right EYE every 8 hours  glucagon  Injectable 1 milliGRAM(s) IntraMuscular once  influenza  Vaccine (HIGH DOSE) 0.5 milliLiter(s) IntraMuscular once  insulin lispro (ADMELOG) corrective regimen sliding scale   SubCutaneous three times a day before meals  lactulose Syrup 20 Gram(s) Oral daily  metroNIDAZOLE    Tablet 500 milliGRAM(s) Oral every 12 hours  pantoprazole    Tablet 40 milliGRAM(s) Oral before breakfast  polyethylene glycol 3350 17 Gram(s) Oral daily  prednisoLONE acetate 1% Suspension 1 Drop(s) Right EYE two times a day  sodium bicarbonate 1300 milliGRAM(s) Oral every 8 hours  timolol 0.25% Solution 1 Drop(s) Right EYE two times a day    MEDICATIONS  (PRN):  dextrose Oral Gel 15 Gram(s) Oral once PRN Blood Glucose LESS THAN 70 milliGRAM(s)/deciliter  melatonin 3 milliGRAM(s) Oral at bedtime PRN Insomnia  oxyCODONE    IR 5 milliGRAM(s) Oral every 6 hours PRN Moderate Pain (4 - 6)

## 2024-12-01 NOTE — CONSULT NOTE ADULT - SUBJECTIVE AND OBJECTIVE BOX
NEPHROLOGY CONSULTATION NOTE    THIS CONSULT IS INCOMPLETE / FULL CONSULT TO FOLLOW    Patient is a 69y Male whom presented to the hospital with     PAST MEDICAL & SURGICAL HISTORY:  History of medical problems  dm type 2, bile duct cancer on chemo, cataracts, gerd, neuropathy (crawling sensation)      H/O inguinal hernia repair  right groin        Allergies:  No Known Allergies    Home Medications Reviewed  Hospital Medications:   MEDICATIONS  (STANDING):  cefTRIAXone   IVPB 2000 milliGRAM(s) IV Intermittent every 24 hours  chlorhexidine 2% Cloths 1 Application(s) Topical <User Schedule>  DAPTOmycin IVPB 500 milliGRAM(s) IV Intermittent every 48 hours  dextrose 5% 1000 milliLiter(s) (75 mL/Hr) IV Continuous <Continuous>  dextrose 50% Injectable 25 Gram(s) IV Push once  dextrose 50% Injectable 12.5 Gram(s) IV Push once  dextrose 50% Injectable 25 Gram(s) IV Push once  dorzolamide 2% Ophthalmic Solution 1 Drop(s) Right EYE every 8 hours  glucagon  Injectable 1 milliGRAM(s) IntraMuscular once  heparin   Injectable 5000 Unit(s) SubCutaneous every 12 hours  influenza  Vaccine (HIGH DOSE) 0.5 milliLiter(s) IntraMuscular once  insulin lispro (ADMELOG) corrective regimen sliding scale   SubCutaneous three times a day before meals  lactulose Syrup 20 Gram(s) Oral daily  megestrol 20 milliGRAM(s) Oral daily  metroNIDAZOLE    Tablet 500 milliGRAM(s) Oral every 12 hours  pantoprazole    Tablet 40 milliGRAM(s) Oral before breakfast  polyethylene glycol 3350 17 Gram(s) Oral daily  prednisoLONE acetate 1% Suspension 1 Drop(s) Right EYE two times a day  sodium bicarbonate 1300 milliGRAM(s) Oral every 8 hours  timolol 0.25% Solution 1 Drop(s) Right EYE two times a day      SOCIAL HISTORY:  Denies ETOH,Smoking,   FAMILY HISTORY:        REVIEW OF SYSTEMS:  CONSTITUTIONAL: No weakness, fevers or chills  EYES/ENT: No visual changes;  No vertigo or throat pain   NECK: No pain or stiffness  RESPIRATORY: No cough, wheezing, hemoptysis; No shortness of breath  CARDIOVASCULAR: No chest pain or palpitations.  GASTROINTESTINAL: No abdominal or epigastric pain. No nausea, vomiting, or hematemesis; No diarrhea or constipation. No melena or hematochezia.  GENITOURINARY: No dysuria, frequency, foamy urine, urinary urgency, incontinence or hematuria  NEUROLOGICAL: No numbness or weakness  SKIN: No itching, burning, rashes, or lesions   VASCULAR: No bilateral lower extremity edema.   All other review of systems is negative unless indicated above.    VITALS:  T(F): 98.6 (12-01-24 @ 05:16), Max: 98.6 (12-01-24 @ 05:16)  HR: 104 (12-01-24 @ 05:16)  BP: 99/61 (12-01-24 @ 05:16)  RR: 18 (12-01-24 @ 05:16)  SpO2: 100% (12-01-24 @ 05:16)    11-30 @ 07:01  -  12-01 @ 07:00  --------------------------------------------------------  IN: 0 mL / OUT: 800 mL / NET: -800 mL            I&O's Detail    30 Nov 2024 07:01  -  01 Dec 2024 07:00  --------------------------------------------------------  IN:  Total IN: 0 mL    OUT:    Drain (mL): 800 mL  Total OUT: 800 mL    Total NET: -800 mL            PHYSICAL EXAM:  Constitutional: NAD  HEENT: anicteric sclera, oropharynx clear, MMM  Neck: No JVD  Respiratory: CTAB, no wheezes, rales or rhonchi  Cardiovascular: S1, S2, RRR  Gastrointestinal: BS+, soft, NT/ND  Extremities: No cyanosis or clubbing. No peripheral edema  Neurological: A/O x 3, no focal deficits  Psychiatric: Normal mood, normal affect  : No CVA tenderness. No rojas.   Skin: No rashes  Vascular Access:    LABS:  11-30    132[L]  |  105  |  33[H]  ----------------------------<  187[H]  5.1[H]   |  16[L]  |  2.3[H]    Ca    7.5[L]      30 Nov 2024 08:32  Mg     2.0     11-29    TPro  7.1  /  Alb  1.9[L]  /  TBili  0.4  /  DBili      /  AST  34  /  ALT  16  /  AlkPhos  223[H]  11-30    Creatinine Trend: 2.3 <--, 1.8 <--, 1.6 <--, 1.7 <--, 1.7 <--, 1.8 <--                        9.2    13.46 )-----------( 143      ( 30 Nov 2024 09:00 )             29.0     Urine Studies:  Urinalysis Basic - ( 30 Nov 2024 08:32 )    Color:  / Appearance:  / SG:  / pH:   Gluc: 187 mg/dL / Ketone:   / Bili:  / Urobili:    Blood:  / Protein:  / Nitrite:    Leuk Esterase:  / RBC:  / WBC    Sq Epi:  / Non Sq Epi:  / Bacteria:       Sodium, Random Urine: 20.0 mmoL/L (11-25 @ 10:18)  Creatinine, Random Urine: 115 mg/dL (11-25 @ 10:18)  Protein/Creatinine Ratio Calculation: 0.3 Ratio (11-25 @ 10:18)  Osmolality, Random Urine: 619 mos/kg (11-25 @ 10:18)  Potassium, Random Urine: 55 mmol/L (11-25 @ 10:18)                RADIOLOGY & ADDITIONAL STUDIES:                 NEPHROLOGY CONSULTATION NOTE      Patient is a 68-year-old male with a past medical history of DM, cataracts, neuropathy, and metastatic cholangiocarcinoma (follows at [facility] [place]) presenting for altered mental status. On my exam, patient is AAOx3, he has some abdominal discomfort with mild pain around the drain he has in RUQ.    As per daughter  Jazlyn López (physician, [phone]) who reports that patient has had short periods of confusion over the last few months, usually caused by infections (cholangitis, SBP...). On admission day patient woke up and was more lethargic than usual, with delayed responses when spoken to. He has been around sick contacts so they decided to bring him in to r/o infection as cause of lethargy.   As per daughter, patient has a peritoneal drain, he drains himself 1L of peritoneal fluid daily. Last MR head to check for mets was done 2 months ago and was negative.   No fever/chills, no vomiting, no urinary sx or diarrhea at home.     Renal called for VIPUL  At bedside this morning responsive denied any complaints/ has right chest wall humza cath     PAST MEDICAL & SURGICAL HISTORY:  History of medical problems  dm type 2, bile duct cancer on chemo, cataracts, gerd, neuropathy (crawling sensation)  H/O inguinal hernia repair right groin        Allergies:  No Known Allergies    Home Medications Reviewed  Hospital Medications:   MEDICATIONS  (STANDING):  cefTRIAXone   IVPB 2000 milliGRAM(s) IV Intermittent every 24 hours  DAPTOmycin IVPB 500 milliGRAM(s) IV Intermittent every 48 hours  dorzolamide 2% Ophthalmic Solution 1 Drop(s) Right EYE every 8 hours  glucagon  Injectable 1 milliGRAM(s) IntraMuscular once  heparin   Injectable 5000 Unit(s) SubCutaneous every 12 hours  influenza  Vaccine (HIGH DOSE) 0.5 milliLiter(s) IntraMuscular once  insulin lispro (ADMELOG) corrective regimen sliding scale   SubCutaneous three times a day before meals  lactulose Syrup 20 Gram(s) Oral daily  megestrol 20 milliGRAM(s) Oral daily  metroNIDAZOLE    Tablet 500 milliGRAM(s) Oral every 12 hours  pantoprazole    Tablet 40 milliGRAM(s) Oral before breakfast  polyethylene glycol 3350 17 Gram(s) Oral daily  prednisoLONE acetate 1% Suspension 1 Drop(s) Right EYE two times a day  sodium bicarbonate 1300 milliGRAM(s) Oral every 8 hours  timolol 0.25% Solution 1 Drop(s) Right EYE two times a day      SOCIAL HISTORY:  Denies ETOH,Smoking,   FAMILY HISTORY:        REVIEW OF SYSTEMS:   All other review of systems is negative unless indicated above.    VITALS:  T(F): 98.6 (12-01-24 @ 05:16), Max: 98.6 (12-01-24 @ 05:16)  HR: 104 (12-01-24 @ 05:16)  BP: 99/61 (12-01-24 @ 05:16)  RR: 18 (12-01-24 @ 05:16)  SpO2: 100% (12-01-24 @ 05:16)    11-30 @ 07:01  -  12-01 @ 07:00  --------------------------------------------------------  IN: 0 mL / OUT: 800 mL / NET: -800 mL            I&O's Detail    30 Nov 2024 07:01  -  01 Dec 2024 07:00  --------------------------------------------------------  IN:  Total IN: 0 mL    OUT:    Drain (mL): 800 mL  Total OUT: 800 mL    Total NET: -800 mL            PHYSICAL EXAM:  Constitutional: NAD  Respiratory: CTAB, no wheezes, rales or rhonchi  Cardiovascular: S1, S2, RRR  Gastrointestinal: distended abdomen / china no pain   Extremities: No cyanosis or clubbing. No peripheral edema  : No CVA tenderness. No rojas.   Skin: No rashes  Vascular Access:    LABS:  12-01    127[L]  |  97[L]  |  37[H]  ----------------------------<  180[H]  4.5   |  19  |  2.8[H]    Ca    7.2[L]      01 Dec 2024 09:38  Phos  2.8     12-01  Mg     2.1     12-01    TPro  6.5  /  Alb  1.8[L]  /  TBili  0.4  /  DBili  x   /  AST  33  /  ALT  13  /  AlkPhos  201[H]  12-01 11-30    132[L]  |  105  |  33[H]  ----------------------------<  187[H]  5.1[H]   |  16[L]  |  2.3[H]  Creatinine Trend: 2.8<--, 2.3<--, 1.8<--, 1.6<--, 1.7<--, 1.7<--  Ca    7.5[L]      30 Nov 2024 08:32  Mg     2.0     11-29    TPro  7.1  /  Alb  1.9[L]  /  TBili  0.4  /  DBili      /  AST  34  /  ALT  16  /  AlkPhos  223[H]  11-30    Creatinine Trend: 2.3 <--, 1.8 <--, 1.6 <--, 1.7 <--, 1.7 <--, 1.8 <--                        9.2    13.46 )-----------( 143      ( 30 Nov 2024 09:00 )             29.0     Urine Studies:  Urinalysis Basic - ( 30 Nov 2024 08:32 )    Color:  / Appearance:  / SG:  / pH:   Gluc: 187 mg/dL / Ketone:   / Bili:  / Urobili:    Blood:  / Protein:  / Nitrite:    Leuk Esterase:  / RBC:  / WBC    Sq Epi:  / Non Sq Epi:  / Bacteria:       Sodium, Random Urine: 20.0 mmoL/L (11-25 @ 10:18)  Creatinine, Random Urine: 115 mg/dL (11-25 @ 10:18)  Protein/Creatinine Ratio Calculation: 0.3 Ratio (11-25 @ 10:18)  Osmolality, Random Urine: 619 mos/kg (11-25 @ 10:18)  Potassium, Random Urine: 55 mmol/L (11-25 @ 10:18)                RADIOLOGY & ADDITIONAL STUDIES:  < from: US Kidney and Bladder (11.30.24 @ 18:11) >    IMPRESSION:  No hydronephrosis bilaterally.    The urinary bladder is partially obscured by lower abdominal ascites    < end of copied text >

## 2024-12-02 LAB
ALBUMIN SERPL ELPH-MCNC: 2.3 G/DL — LOW (ref 3.5–5.2)
ALP SERPL-CCNC: 190 U/L — HIGH (ref 30–115)
ALT FLD-CCNC: 14 U/L — SIGNIFICANT CHANGE UP (ref 0–41)
ANION GAP SERPL CALC-SCNC: 9 MMOL/L — SIGNIFICANT CHANGE UP (ref 7–14)
APPEARANCE UR: CLEAR — SIGNIFICANT CHANGE UP
AST SERPL-CCNC: 28 U/L — SIGNIFICANT CHANGE UP (ref 0–41)
BACTERIA # UR AUTO: NEGATIVE /HPF — SIGNIFICANT CHANGE UP
BASOPHILS # BLD AUTO: 0.1 K/UL — SIGNIFICANT CHANGE UP (ref 0–0.2)
BASOPHILS NFR BLD AUTO: 0.8 % — SIGNIFICANT CHANGE UP (ref 0–1)
BILIRUB SERPL-MCNC: 0.6 MG/DL — SIGNIFICANT CHANGE UP (ref 0.2–1.2)
BILIRUB UR-MCNC: NEGATIVE — SIGNIFICANT CHANGE UP
BUN SERPL-MCNC: 43 MG/DL — HIGH (ref 10–20)
CALCIUM SERPL-MCNC: 7.4 MG/DL — LOW (ref 8.4–10.4)
CAST: 19 /LPF — HIGH (ref 0–4)
CHLORIDE SERPL-SCNC: 94 MMOL/L — LOW (ref 98–110)
CK SERPL-CCNC: 67 U/L — SIGNIFICANT CHANGE UP (ref 0–225)
CO2 SERPL-SCNC: 24 MMOL/L — SIGNIFICANT CHANGE UP (ref 17–32)
COLOR SPEC: SIGNIFICANT CHANGE UP
CREAT ?TM UR-MCNC: 160 MG/DL — SIGNIFICANT CHANGE UP
CREAT SERPL-MCNC: 3 MG/DL — HIGH (ref 0.7–1.5)
CULTURE RESULTS: SIGNIFICANT CHANGE UP
DIFF PNL FLD: NEGATIVE — SIGNIFICANT CHANGE UP
EGFR: 22 ML/MIN/1.73M2 — LOW
EOSINOPHIL # BLD AUTO: 1.15 K/UL — HIGH (ref 0–0.7)
EOSINOPHIL NFR BLD AUTO: 9.4 % — HIGH (ref 0–8)
FINE GRAN CASTS #/AREA URNS AUTO: PRESENT
GLUCOSE BLDC GLUCOMTR-MCNC: 173 MG/DL — HIGH (ref 70–99)
GLUCOSE BLDC GLUCOMTR-MCNC: 201 MG/DL — HIGH (ref 70–99)
GLUCOSE BLDC GLUCOMTR-MCNC: 230 MG/DL — HIGH (ref 70–99)
GLUCOSE BLDC GLUCOMTR-MCNC: 245 MG/DL — HIGH (ref 70–99)
GLUCOSE SERPL-MCNC: 179 MG/DL — HIGH (ref 70–99)
GLUCOSE UR QL: NEGATIVE MG/DL — SIGNIFICANT CHANGE UP
HCT VFR BLD CALC: 25.5 % — LOW (ref 42–52)
HGB BLD-MCNC: 8.5 G/DL — LOW (ref 14–18)
IMM GRANULOCYTES NFR BLD AUTO: 0.7 % — HIGH (ref 0.1–0.3)
KETONES UR-MCNC: ABNORMAL MG/DL
LEUKOCYTE ESTERASE UR-ACNC: ABNORMAL
LYMPHOCYTES # BLD AUTO: 17 % — LOW (ref 20.5–51.1)
LYMPHOCYTES # BLD AUTO: 2.09 K/UL — SIGNIFICANT CHANGE UP (ref 1.2–3.4)
MCHC RBC-ENTMCNC: 28.4 PG — SIGNIFICANT CHANGE UP (ref 27–31)
MCHC RBC-ENTMCNC: 33.3 G/DL — SIGNIFICANT CHANGE UP (ref 32–37)
MCV RBC AUTO: 85.3 FL — SIGNIFICANT CHANGE UP (ref 80–94)
MONOCYTES # BLD AUTO: 1.42 K/UL — HIGH (ref 0.1–0.6)
MONOCYTES NFR BLD AUTO: 11.6 % — HIGH (ref 1.7–9.3)
NEUTROPHILS # BLD AUTO: 7.44 K/UL — HIGH (ref 1.4–6.5)
NEUTROPHILS NFR BLD AUTO: 60.5 % — SIGNIFICANT CHANGE UP (ref 42.2–75.2)
NITRITE UR-MCNC: POSITIVE
NRBC # BLD: 0 /100 WBCS — SIGNIFICANT CHANGE UP (ref 0–0)
PH UR: 6 — SIGNIFICANT CHANGE UP (ref 5–8)
PLATELET # BLD AUTO: 195 K/UL — SIGNIFICANT CHANGE UP (ref 130–400)
PMV BLD: 9.6 FL — SIGNIFICANT CHANGE UP (ref 7.4–10.4)
POTASSIUM SERPL-MCNC: 4.4 MMOL/L — SIGNIFICANT CHANGE UP (ref 3.5–5)
POTASSIUM SERPL-SCNC: 4.4 MMOL/L — SIGNIFICANT CHANGE UP (ref 3.5–5)
PROT ?TM UR-MCNC: 134 MG/DLG/24H — SIGNIFICANT CHANGE UP
PROT SERPL-MCNC: 7.2 G/DL — SIGNIFICANT CHANGE UP (ref 6–8)
PROT UR-MCNC: 100 MG/DL
PROT/CREAT UR-RTO: 0.8 RATIO — HIGH (ref 0–0.2)
RBC # BLD: 2.99 M/UL — LOW (ref 4.7–6.1)
RBC # FLD: 16 % — HIGH (ref 11.5–14.5)
RBC CASTS # UR COMP ASSIST: 1 /HPF — SIGNIFICANT CHANGE UP (ref 0–4)
SODIUM SERPL-SCNC: 127 MMOL/L — LOW (ref 135–146)
SODIUM UR-SCNC: <20 MMOL/L — SIGNIFICANT CHANGE UP
SP GR SPEC: 1.02 — SIGNIFICANT CHANGE UP (ref 1–1.03)
SPECIMEN SOURCE: SIGNIFICANT CHANGE UP
SQUAMOUS # UR AUTO: 6 /HPF — HIGH (ref 0–5)
UROBILINOGEN FLD QL: 1 MG/DL — SIGNIFICANT CHANGE UP (ref 0.2–1)
WBC # BLD: 12.28 K/UL — HIGH (ref 4.8–10.8)
WBC # FLD AUTO: 12.28 K/UL — HIGH (ref 4.8–10.8)
WBC UR QL: 21 /HPF — HIGH (ref 0–5)

## 2024-12-02 PROCEDURE — 99223 1ST HOSP IP/OBS HIGH 75: CPT

## 2024-12-02 PROCEDURE — 99232 SBSQ HOSP IP/OBS MODERATE 35: CPT

## 2024-12-02 RX ORDER — MIDODRINE HYDROCHLORIDE 5 MG/1
100 TABLET ORAL EVERY 8 HOURS
Refills: 0 | Status: DISCONTINUED | OUTPATIENT
Start: 2024-12-02 | End: 2024-12-02

## 2024-12-02 RX ORDER — DAPTOMYCIN 500 MG/10ML
500 INJECTION, POWDER, LYOPHILIZED, FOR SOLUTION INTRAVENOUS
Refills: 0 | Status: DISCONTINUED | OUTPATIENT
Start: 2024-12-03 | End: 2024-12-03

## 2024-12-02 RX ORDER — HEPARIN SODIUM,PORCINE 1000/ML
5000 VIAL (ML) INJECTION EVERY 12 HOURS
Refills: 0 | Status: DISCONTINUED | OUTPATIENT
Start: 2024-12-02 | End: 2024-12-04

## 2024-12-02 RX ORDER — 0.9 % SODIUM CHLORIDE 0.9 %
1000 INTRAVENOUS SOLUTION INTRAVENOUS
Refills: 0 | Status: DISCONTINUED | OUTPATIENT
Start: 2024-12-02 | End: 2024-12-04

## 2024-12-02 RX ORDER — MIDODRINE HYDROCHLORIDE 5 MG/1
10 TABLET ORAL EVERY 8 HOURS
Refills: 0 | Status: DISCONTINUED | OUTPATIENT
Start: 2024-12-02 | End: 2024-12-04

## 2024-12-02 RX ADMIN — CHLORHEXIDINE GLUCONATE 1 APPLICATION(S): 1.2 RINSE ORAL at 05:41

## 2024-12-02 RX ADMIN — Medication 50 MILLILITER(S): at 09:27

## 2024-12-02 RX ADMIN — Medication 1 DROP(S): at 05:39

## 2024-12-02 RX ADMIN — Medication 2: at 08:29

## 2024-12-02 RX ADMIN — METRONIDAZOLE 500 MILLIGRAM(S): 500 TABLET ORAL at 17:18

## 2024-12-02 RX ADMIN — MIDODRINE HYDROCHLORIDE 10 MILLIGRAM(S): 5 TABLET ORAL at 22:32

## 2024-12-02 RX ADMIN — DORZOLAMIDE HYDROCHLORIDE 1 DROP(S): 20 SOLUTION OPHTHALMIC at 13:38

## 2024-12-02 RX ADMIN — SODIUM BICARBONATE 1300 MILLIGRAM(S): 84 INJECTION, SOLUTION INTRAVENOUS at 22:32

## 2024-12-02 RX ADMIN — Medication 5000 UNIT(S): at 17:18

## 2024-12-02 RX ADMIN — SODIUM BICARBONATE 1300 MILLIGRAM(S): 84 INJECTION, SOLUTION INTRAVENOUS at 05:40

## 2024-12-02 RX ADMIN — DORZOLAMIDE HYDROCHLORIDE 1 DROP(S): 20 SOLUTION OPHTHALMIC at 22:32

## 2024-12-02 RX ADMIN — LACTULOSE 20 GRAM(S): 10 SOLUTION ORAL at 11:10

## 2024-12-02 RX ADMIN — Medication 1 DROP(S): at 17:17

## 2024-12-02 RX ADMIN — DORZOLAMIDE HYDROCHLORIDE 1 DROP(S): 20 SOLUTION OPHTHALMIC at 05:54

## 2024-12-02 RX ADMIN — PREDNISOLONE ACETATE 1 DROP(S): 1.2 SUSPENSION/ DROPS OPHTHALMIC at 05:39

## 2024-12-02 RX ADMIN — OXYCODONE HYDROCHLORIDE 5 MILLIGRAM(S): 30 TABLET ORAL at 22:34

## 2024-12-02 RX ADMIN — Medication 2: at 17:19

## 2024-12-02 RX ADMIN — PREDNISOLONE ACETATE 1 DROP(S): 1.2 SUSPENSION/ DROPS OPHTHALMIC at 17:17

## 2024-12-02 RX ADMIN — SODIUM BICARBONATE 1300 MILLIGRAM(S): 84 INJECTION, SOLUTION INTRAVENOUS at 13:39

## 2024-12-02 RX ADMIN — POLYETHYLENE GLYCOL 3350 17 GRAM(S): 17 POWDER, FOR SOLUTION ORAL at 11:11

## 2024-12-02 RX ADMIN — Medication 1: at 11:36

## 2024-12-02 RX ADMIN — METRONIDAZOLE 500 MILLIGRAM(S): 500 TABLET ORAL at 05:40

## 2024-12-02 RX ADMIN — Medication 100 MILLIGRAM(S): at 13:38

## 2024-12-02 RX ADMIN — MIDODRINE HYDROCHLORIDE 10 MILLIGRAM(S): 5 TABLET ORAL at 13:39

## 2024-12-02 RX ADMIN — PANTOPRAZOLE SODIUM 40 MILLIGRAM(S): 40 TABLET, DELAYED RELEASE ORAL at 05:54

## 2024-12-02 RX ADMIN — OXYCODONE HYDROCHLORIDE 5 MILLIGRAM(S): 30 TABLET ORAL at 23:04

## 2024-12-02 RX ADMIN — Medication 75 MILLILITER(S): at 11:10

## 2024-12-02 RX ADMIN — Medication 75 MILLILITER(S): at 12:25

## 2024-12-02 NOTE — PROGRESS NOTE ADULT - ASSESSMENT
Patient is a 68-year-old male with a past medical history of DM, cataracts, neuropathy, and metastatic cholangiocarcinoma (follows at [facility]) presenting for altered mental status  # VIPUL prerenal vs ATN   # hyponatremia  # metastatic cholangioKc  # anemia chronic   # VRE and E fecalis bacteremia   - cr trending up   - LR at 100 cc per hour  - strict I and O/ check bladder scan   - ph at goal  - continue sodium bicarb po  - U lytes noted c/w prerenal hypovolemia component   -  BP on the low side, start midodrine 5  q 8   - no need for RRT  will follow

## 2024-12-02 NOTE — PROGRESS NOTE ADULT - ASSESSMENT
68-year-old male with a past medical history of DM, cataracts, neuropathy, and metastatic cholangiocarcinoma (follows at [facility] [place]) presenting for altered mental status. daughter Jazlyn López (physician, [phone]) who reports that patient has had short periods of confusion over the last few months, usually caused by infections (cholangitis, SBP...). This morning, patient woke up and was more lethargic than usual, with delayed responses when spoken to. He has been around sick contacts so they decided to bring him in to r/o infection as cause of lethargy. As per daughter, patient has a peritoneal drain, he drains himself 1L of peritoneal fluid daily. Last MR head to check for mets was done 2 months ago and was negative.     #Lethargy   #Metabolic encephalopathy   #VRE and E. coli Bacteremia   #Metastatic cholangiocarcinoma s/p CBD and hepatic duct stenting s/p cholecystoduodenostomy s/p peritoneal drain (currently in place)  #Large abdominal ascites   #Hx of hepatic abscess   * Follows at [facility] in [place] since May 2022 for cholangiocarcinoma  * Follows with Dr Sanchez (Advanced GI) and Dr. Littlejohn (oncologist) at [facility] [place]  * Deemed not a surgical candidate because of extensive metastatic disease  * Pt has a peritoneal drain in place and drains himself 1L of peritoneal fluid daily   * Last MR Head around 2 months ago, no mets  - Vitals on arrival: /72, HR 97, T 97, RR 17, SpO2 98% on RA  - On admission WBC 8, lactate 3.9, t bili 1.1, alk phos 258, ammonia 71  - UA negative, RVP negative  - CXR (11.24.24 @ 13:25): Low lung volumes without focal consolidation or pneumothorax.  - CTAP w/ IV Cont (11.24.24 @ 14:43): Large abdominal ascites occupying the entire peritoneum. Questionable reactive changes of the small bowel due to ascites. No bowel obstruction. Drain from the level of the gallbladder to the duodenum with associated metal artifact obscuring evaluation. Biliary duct stents are also noted. Questionable scrotal wall edema. Correlate with physical exam. Mildly nodular liver may be seen with cirrhosis and associated varices as described.  - CT Head No Cont (11.24.24 @ 14:43): Allowing for motion artifact, unremarkable study.  - s/p Ciprofloxacin 400mg, Flagyl 500mg, LR 1L bolus in the ED  - Blood culture positive for VRE and E coli  bugs.   - Continue ceftriaxone, dapto, and flagyl,  will fu ID   - Lactulose 20g q6 for 2 days  - Monitor BMs  - Palliative care consult appreciated   - **If patient remains lethargic or develops confusion, consider MR Head to r/o metastatic disease. It patient drops in BP, give albumin 100cc +/- IVF***  - 1 L fluid drained fluid studies sent. Does not meet criteria for SBP.   - CK ordered for baseline and trend weekly while on Dapto   - TTE ordered to r/o infective endocarditis per ID recs --> negative for IE. ID recommends KELVIN due to concern, pending cardio consult   - last few blood cxs -ve  -- Continue ceftriaxone and flagyl   - Continue Dapto (renally dosed)   - CK weekly while on Dapto   - TTE negative   - Cardio consulted for KELVIN - appreciate cardiology recs -- presence of varices, risks potentially outweigh benefits   - Per id, as unable to rule out endocarditis and with presence of port, would plan daptomycin 500 mg q 48 hours for 6 weeks from culture clearance (11/27-1/7). continue ceftriaxone 2g daily -- > can switch to Levofloxacin 500 mg q 48 hours PO (end date 12/8). continue flagyl 500 mg q 12 hours PO (end date 12/8)   - Remove port if not needed by oncology : to be followed up with pt's oncologist.  -Pt BP on lower side, started IVF yesterday, added midodrine 10mg q8h today.       #VIPUL on CKD stage 3, likely pre-renal  - Creatinine on admission 1.8  - Creatinine 1.2 in June  - s/p 1L LR bolus in ED  - - U lytes c/w prerenal hypovolemia component   - on IVF (d5)  -will give another dose of albumin today for possible hepato-renal etiology  -send another U/A     #thrombocytopenia, resolved  -plts today 195, improved, f/u HIT panel  -heparin held    #DM   - On home Farxiga 10mg daily  - ISS for now, target -180  - Monitor FS and adjust accordingly    #Cataract  - Continue home eye drop      #DVT ppx: Heparin  #GI ppx: Pantoprazole  #Diet: CC  #Activity: IAT  #Code: FULL code, confirmed with daughter  #Dispo: Medicine     pending: HIT panel, give albumin, start midodrine, send U/A, f/u with incologist   68-year-old male with a past medical history of DM, cataracts, neuropathy, and metastatic cholangiocarcinoma (follows at [facility] [place]) presenting for altered mental status. daughter Jazlyn López (physician, [phone]) who reports that patient has had short periods of confusion over the last few months, usually caused by infections (cholangitis, SBP...). This morning, patient woke up and was more lethargic than usual, with delayed responses when spoken to. He has been around sick contacts so they decided to bring him in to r/o infection as cause of lethargy. As per daughter, patient has a peritoneal drain, he drains himself 1L of peritoneal fluid daily. Last MR head to check for mets was done 2 months ago and was negative.     #Lethargy   #Metabolic encephalopathy   #VRE and E. coli Bacteremia   #Metastatic cholangiocarcinoma s/p CBD and hepatic duct stenting s/p cholecystoduodenostomy s/p peritoneal drain (currently in place)  #Large abdominal ascites   #Hx of hepatic abscess   * Follows at [facility] in [place] since May 2022 for cholangiocarcinoma  * Follows with Dr Sanchez (Advanced GI) and Dr. Littlejohn (oncologist) at [facility]  * Deemed not a surgical candidate because of extensive metastatic disease  * Pt has a peritoneal drain in place and drains himself 1L of peritoneal fluid daily   * Last MR Head around 2 months ago, no mets  - Vitals on arrival: /72, HR 97, T 97, RR 17, SpO2 98% on RA  - On admission WBC 8, lactate 3.9, t bili 1.1, alk phos 258, ammonia 71  - UA negative, RVP negative  - CXR (11.24.24 @ 13:25): Low lung volumes without focal consolidation or pneumothorax.  - CTAP w/ IV Cont (11.24.24 @ 14:43): Large abdominal ascites occupying the entire peritoneum. Questionable reactive changes of the small bowel due to ascites. No bowel obstruction. Drain from the level of the gallbladder to the duodenum with associated metal artifact obscuring evaluation. Biliary duct stents are also noted. Questionable scrotal wall edema. Correlate with physical exam. Mildly nodular liver may be seen with cirrhosis and associated varices as described.  - CT Head No Cont (11.24.24 @ 14:43): Allowing for motion artifact, unremarkable study.  - s/p Ciprofloxacin 400mg, Flagyl 500mg, LR 1L bolus in the ED  - Blood culture positive for VRE and E coli  bugs.   - Continue ceftriaxone, dapto, and flagyl,  will fu ID   - Lactulose 20g q6 for 2 days  - Monitor BMs  - Palliative care consult appreciated   - **If patient remains lethargic or develops confusion, consider MR Head to r/o metastatic disease. It patient drops in BP, give albumin 100cc +/- IVF***  - 1 L fluid drained fluid studies sent. Does not meet criteria for SBP.   - CK ordered for baseline and trend weekly while on Dapto   - TTE ordered to r/o infective endocarditis per ID recs --> negative for IE. ID recommends KELVIN due to concern, pending cardio consult   - last few blood cxs -ve  -- Continue ceftriaxone and flagyl   - Continue Dapto (renally dosed)   - CK weekly while on Dapto   - TTE negative   - Cardio consulted for KELVIN - appreciate cardiology recs -- presence of varices, risks potentially outweigh benefits   - Per id, as unable to rule out endocarditis and with presence of port, would plan daptomycin 500 mg q 48 hours for 6 weeks from culture clearance (11/27-1/7). continue ceftriaxone 2g daily -- > can switch to Levofloxacin 500 mg q 48 hours PO (end date 12/8). continue flagyl 500 mg q 12 hours PO (end date 12/8)   - Remove port if not needed by oncology : to be followed up with pt's oncologist.  -Pt BP on lower side, started IVF yesterday, added midodrine 10mg q8h today.       #VIPUL on CKD stage 3, likely pre-renal  - Creatinine on admission 1.8  - Creatinine 1.2 in June  - s/p 1L LR bolus in ED  - - U lytes c/w prerenal hypovolemia component   - on IVF (d5) with bicarb, f/u serum bicarbonate level  -will give another dose of albumin today for possible hepato-renal etiology  -send another U/A     #thrombocytopenia, resolved  -plts today 195, improved, f/u HIT panel  -heparin held    #DM   - On home Farxiga 10mg daily  - ISS for now, target -180  - Monitor FS and adjust accordingly    #Cataract  - Continue home eye drop      #DVT ppx: Heparin  #GI ppx: Pantoprazole  #Diet: CC  #Activity: IAT  #Code: FULL code, confirmed with daughter  #Dispo: Medicine     pending: HIT panel, give albumin, start midodrine, send U/A, f/u with incologist   68-year-old male with a past medical history of DM, cataracts, neuropathy, and metastatic cholangiocarcinoma (follows at [facility] [place]) presenting for altered mental status. daughter Jazlyn López (physician, [phone]) who reports that patient has had short periods of confusion over the last few months, usually caused by infections (cholangitis, SBP...). This morning, patient woke up and was more lethargic than usual, with delayed responses when spoken to. He has been around sick contacts so they decided to bring him in to r/o infection as cause of lethargy. As per daughter, patient has a peritoneal drain, he drains himself 1L of peritoneal fluid daily. Last MR head to check for mets was done 2 months ago and was negative.     #Lethargy   #Metabolic encephalopathy   #VRE and E. coli Bacteremia   #Metastatic cholangiocarcinoma s/p CBD and hepatic duct stenting s/p cholecystoduodenostomy s/p peritoneal drain (currently in place)  #Large abdominal ascites   #Hx of hepatic abscess   * Follows at [facility] in [place] since May 2022 for cholangiocarcinoma  * Follows with Dr Sanchez (Advanced GI) and Dr. Littlejohn (oncologist) at [facility] [place]  * Deemed not a surgical candidate because of extensive metastatic disease  * Pt has a peritoneal drain in place and drains himself 1L of peritoneal fluid daily   * Last MR Head around 2 months ago, no mets  - Vitals on arrival: /72, HR 97, T 97, RR 17, SpO2 98% on RA  - On admission WBC 8, lactate 3.9, t bili 1.1, alk phos 258, ammonia 71  - UA negative, RVP negative  - CXR (11.24.24 @ 13:25): Low lung volumes without focal consolidation or pneumothorax.  - CTAP w/ IV Cont (11.24.24 @ 14:43): Large abdominal ascites occupying the entire peritoneum. Questionable reactive changes of the small bowel due to ascites. No bowel obstruction. Drain from the level of the gallbladder to the duodenum with associated metal artifact obscuring evaluation. Biliary duct stents are also noted. Questionable scrotal wall edema. Correlate with physical exam. Mildly nodular liver may be seen with cirrhosis and associated varices as described.  - CT Head No Cont (11.24.24 @ 14:43): Allowing for motion artifact, unremarkable study.  - s/p Ciprofloxacin 400mg, Flagyl 500mg, LR 1L bolus in the ED  - Blood culture positive for VRE and E coli  bugs.   - Continue ceftriaxone, dapto, and flagyl,  will fu ID   - Lactulose 20g q6 for 2 days  - Monitor BMs  - Palliative care consult appreciated   - **If patient remains lethargic or develops confusion, consider MR Head to r/o metastatic disease. It patient drops in BP, give albumin 100cc +/- IVF***  - 1 L fluid drained fluid studies sent. Does not meet criteria for SBP.   - CK ordered for baseline and trend weekly while on Dapto   - TTE ordered to r/o infective endocarditis per ID recs --> negative for IE. ID recommends KELVIN due to concern, pending cardio consult   - last few blood cxs -ve  -- Continue ceftriaxone and flagyl   - Continue Dapto (renally dosed)   - CK weekly while on Dapto   - TTE negative   - Cardio consulted for KELVIN - appreciate cardiology recs -- presence of varices, risks potentially outweigh benefits   - Per id, as unable to rule out endocarditis and with presence of port, would plan daptomycin 500 mg q 48 hours for 6 weeks from culture clearance (11/27-1/7). continue ceftriaxone 2g daily -- > can switch to Levofloxacin 500 mg q 48 hours PO (end date 12/8). continue flagyl 500 mg q 12 hours PO (end date 12/8)   - Remove port if not needed by oncology : to be followed up with pt's oncologist.  -Pt BP on lower side, started IVF yesterday, added midodrine 10mg q8h today.       #VIPUL on CKD stage 3, likely pre-renal  - Creatinine on admission 1.8  - Creatinine 1.2 in June  - s/p 1L LR bolus in ED  - - U lytes c/w prerenal hypovolemia component   - on IVF (d5) with bicarb, f/u serum bicarbonate level  -will give another dose of albumin today for possible hepato-renal etiology  -send another U/A     #thrombocytopenia, resolved  -plts today 195, improved, f/u HIT panel  -heparin held yest: resumed    #DM   - On home Farxiga 10mg daily  - ISS for now, target -180  - Monitor FS and adjust accordingly    #Cataract  - Continue home eye drop      #DVT ppx: Heparin  #GI ppx: Pantoprazole  #Diet: CC  #Activity: IAT  #Code: FULL code, confirmed with daughter  #Dispo: Medicine     pending: HIT panel, give albumin, start midodrine, send U/A, f/u with incologist

## 2024-12-02 NOTE — PROGRESS NOTE ADULT - SUBJECTIVE AND OBJECTIVE BOX
seen and examined  24 h events noted   no distress         PAST HISTORY  --------------------------------------------------------------------------------  No significant changes to PMH, PSH, FHx, SHx, unless otherwise noted    ALLERGIES & MEDICATIONS  --------------------------------------------------------------------------------  Allergies    No Known Allergies    Intolerances      Standing Inpatient Medications  cefTRIAXone   IVPB 2000 milliGRAM(s) IV Intermittent every 24 hours  chlorhexidine 2% Cloths 1 Application(s) Topical <User Schedule>  dextrose 5% 1000 milliLiter(s) IV Continuous <Continuous>  dextrose 50% Injectable 25 Gram(s) IV Push once  dextrose 50% Injectable 12.5 Gram(s) IV Push once  dextrose 50% Injectable 25 Gram(s) IV Push once  dorzolamide 2% Ophthalmic Solution 1 Drop(s) Right EYE every 8 hours  glucagon  Injectable 1 milliGRAM(s) IntraMuscular once  influenza  Vaccine (HIGH DOSE) 0.5 milliLiter(s) IntraMuscular once  insulin lispro (ADMELOG) corrective regimen sliding scale   SubCutaneous three times a day before meals  lactulose Syrup 20 Gram(s) Oral daily  metroNIDAZOLE    Tablet 500 milliGRAM(s) Oral every 12 hours  pantoprazole    Tablet 40 milliGRAM(s) Oral before breakfast  polyethylene glycol 3350 17 Gram(s) Oral daily  prednisoLONE acetate 1% Suspension 1 Drop(s) Right EYE two times a day  sodium bicarbonate 1300 milliGRAM(s) Oral every 8 hours  timolol 0.25% Solution 1 Drop(s) Right EYE two times a day    PRN Inpatient Medications  dextrose Oral Gel 15 Gram(s) Oral once PRN  melatonin 3 milliGRAM(s) Oral at bedtime PRN  oxyCODONE    IR 5 milliGRAM(s) Oral every 6 hours PRN        VITALS/PHYSICAL EXAM  --------------------------------------------------------------------------------  T(C): 36.9 (12-01-24 @ 19:33), Max: 36.9 (12-01-24 @ 19:33)  HR: 102 (12-01-24 @ 19:33) (89 - 102)  BP: 101/61 (12-01-24 @ 19:33) (84/56 - 110/70)  RR: 18 (12-01-24 @ 19:33) (16 - 18)  SpO2: 100% (12-01-24 @ 19:33) (98% - 100%)  Wt(kg): --        12-01-24 @ 07:01  -  12-02-24 @ 07:00  --------------------------------------------------------  IN: 0 mL / OUT: 550 mL / NET: -550 mL      Physical Exam:  	Gen: NAD  	Pulm: decrease BS  B/L  	CV: S1S2; no rub  	Abd: +distended      LABS/STUDIES  --------------------------------------------------------------------------------              8.5    12.28 >-----------<  195      [12-02-24 @ 07:06]              25.5     127  |  97  |  37  ----------------------------<  180      [12-01-24 @ 09:38]  4.5   |  19  |  2.8        Ca     7.2     [12-01-24 @ 09:38]      Mg     2.1     [12-01-24 @ 09:38]      Phos  2.8     [12-01-24 @ 09:38]    TPro  6.5  /  Alb  1.8  /  TBili  0.4  /  DBili  x   /  AST  33  /  ALT  13  /  AlkPhos  201  [12-01-24 @ 09:38]      Creatinine Trend:  SCr 2.8 [12-01 @ 09:38]  SCr 2.3 [11-30 @ 08:32]  SCr 1.8 [11-29 @ 11:36]  SCr 1.6 [11-28 @ 14:01]  SCr 1.7 [11-27 @ 13:57]    Urinalysis - [12-01-24 @ 09:38]      Color  / Appearance  / SG  / pH       Gluc 180 / Ketone   / Bili  / Urobili        Blood  / Protein  / Leuk Est  / Nitrite       RBC  / WBC  / Hyaline  / Gran  / Sq Epi  / Non Sq Epi  / Bacteria     Urine Creatinine 115      [11-25-24 @ 10:18]  Urine Protein 34      [11-25-24 @ 10:18]  Urine Sodium 20.0      [11-25-24 @ 10:18]  Urine Urea Nitrogen 774      [11-25-24 @ 10:18]  Urine Potassium 55      [11-25-24 @ 10:18]  Urine Osmolality 619      [11-25-24 @ 10:18]

## 2024-12-02 NOTE — CONSULT NOTE ADULT - ATTENDING COMMENTS
69M with metastatic cholangiocarcinoma, E coli and VRE bacteremia, s/p chemoport, cardiology consulted for KELVIN to assess for endocarditis, which would help guide antibiotic regimen and determine if port should be removed. CT a/p 11/24/24 reviewed and shows perigastric and paraesophageal varices. Do not recommend KELVIN given varices.     Cardiology will sign off.     Sarah Florentino MD  Vascular Cardiology Attending  Please text or call via MS Teams with questions
68 y o M of [place] ethnicity with T2DM metastatic cholangiocarcinoma admitted with change in mental statusseen for cirrhosis with ascites and varices on imaging.    [place] [facility] [place]x of cholangitis s/p biliary stents. Reported hx of SBP. Admissions with confusion. Patient reprots not awarw of prognosis.    No icterus  Abdomen mild distension non tender  No asterixis       Cirrhosis  portal HTN decompensation with ascites  CT shows irregular liver contour SAAG 1.5  VIPUL - likely HRS-VIPUL  Hx of SBP  Metastatic cholangio ca  Hx of cholangitis with biliary stents and choledochoduodenostomy      IV albumin 1g/kg x 2 days  Palliative input and discussion of goals of care.  Records from [facility] [place]
Patient seen and examined during the GI -Advanced Endoscopy rounds, case discussed with the GI fellow and communicated to primary team, assessment and plan as above

## 2024-12-02 NOTE — CONSULT NOTE ADULT - ASSESSMENT
#Decompensated liver cirrhosis with ascites, varices   #Metastatic Cholangiocarcinoma , hx of cholangitis , s/p biliary/hepatic duct drains and cholecystoduodenostomy  [place] [facility] [place]  #E Coli and STERLING Faecium Bacteremia on 11/24 Likely GI Translocation  #Large Ascites with No Evidence of SBP; Reported Hx of SBP; s/p Peritoneal Drain at [facility] [place]  #Metabolic Encephalopathy Likely in Setting of Bacteremia; Less Likely Hepatic Encephalopathy  #VIPUL- r/o HRS  - No Evidence of Acute Cholangitis; Reported Hx of Cholangitis;   - Chronic Elevation of ALP  - Perigastric, Paraesophageal, perisplenic and right abdominal wall varices on imaging  * Diagnosed with metastatic cholangiocarcinoma last year. s/p peritoneal drain; biliary/hepatic duct drain; and axios from GB to duodenum at [facility] [place]  * US Abdomen Upper Quadrant Right (06.18.24 @ 02:49) Liver: Partially imaged. Partially imaged stents. No hepatic collection on provided images.Bile ducts: Common bile duct visualized. Intrahepatic biliary ducts are without significant dilatation.  * CT Abdomen and Pelvis w/ IV Cont (11.24.24 @ 14:43) >large abdominal ascites occupying the entire peritoneum. Questionable reactive changes of the small bowel due to ascites. No bowel obstruction.Drain from the level of the gallbladder to the duodenum with associated metal artifact obscuring evaluation. Biliary duct stents are also noted. questionable scrotal wall edema. Correlate with physical exam. Mildly nodular liver may be seen with cirrhosis and associated varices as described.  * Previous colonoscopy was last year; could not recall last EGD  * No FHx of GI cancers  * S/P drainage of 1L peritoneal fluid via peritoneal drain on 11/25: fluid not suggestive of SBP; staph epidermidis likely contaminant  - mild asterixis, but AAX3    RECS  - For VIPUL: start 1g/kg IV albumin , -please obtain Urine studies to calculate UOSm, FeNA and FeUrea; not on any diuretics  - Obtain records from [facility] [place]  - Trend liver enzymes.l  - Recommend goals of care discussion. Recommend oncology/palliative evaluation if family is agreeable (currently refusing)  - ID evaluation appreciated. Continue IV Ab per ID (on Rocephin, Daptomycin, and Flagyl). Trend CK. TTE noted with thickened leafklets. Follow up repeat blood cultures. No indication to remove peritoneal drain if peritoneal fluid cx with NGTD monitor for fever; monitor MAP and keep > 65mmHg; follow up blood cultures   - For encephalopathy: Monitor BM and avoid constipation. Continue lactulose 20mg Q6h and miralax 17g QD  - Continue Protonix 40mg QD for GI Prophylaxis; avoid NSAIDs  - Trend CBC and transfuse as needed; keep active type and screen. Check anemia workup (iron studies)  - Follow up with [facility] team or at our [facility] located at [address] [place] [address]. Phone Number: [phone]    - avoid sedatives and opioids   - Please avoid NSAIDs, ACEI/ARB, NSBB      # Lifestyle/Dietary modifications  - Calorie intake 25-40 Kcal/kg/day  - Protein intake: 1.2-1.5 gm/kg/day  - Avoid smoking, alcohol, NSAIDS.  - Abstain from alcohol and all illicit drugs  -Avoid use of herbal and dietary supplements due to potential hepatotoxicity  -Limit use of acetaminophen to <2 grams per day  -Avoid use of nonsteroidal antiinflammatory drugs (NSAIDs)    -Avoid eating any unpasteurized dairy products; avoid eating any raw or undercooked eggs, fish, poultry, or meat; and avoid eating raw/steamed oysters or other shellfish to avoid risk of Vibrio infection.    #Vaccinations:  Please f/u in clinic for being uptodate with HAV/HBV/Influenza/Pneumococcal vaccines.

## 2024-12-02 NOTE — PROGRESS NOTE ADULT - ASSESSMENT
ASSESSMENT   68-year-old male with a past medical history of DM, cataracts, neuropathy, and metastatic cholangiocarcinoma (follows at [facility] [place]) presenting for altered mental status. On my exam, patient is AAOx3, he has some abdominal discomfort with mild pain around the drain he has in RUQ.    IMPRESSION  #Abdominal Pain/AMS   #Metastatic Cholangiocarcionma  s/p CBD and hepatic duct stenting s/p cholecystoduodenostomy s/p peritoneal drain     #E coli and VRE faecium bacteremia  - Blood Cx 11/24+ for E coli and VRE faecium  - Blood Cx 11/25 + VRE faecium   - Blood Cx 11/27 NG  - CT Abdomen and Pelvis w/ IV Cont (11.24.24 @ 14:43) > Large abdominal ascites occupying the entire peritoneum. Questionable  reactive changes of the small bowel due to ascites. No bowel obstruction. Drain from the level of the gallbladder to the duodenum with associated metal artifact obscuring evaluation. Biliary duct stents are also noted. Questionable scrotal wall edema. Correlate with physical exam. Mildly nodular liver may be seen with cirrhosis and associated varices as  described.  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    #Abx allergy: No Known Allergies    RECOMMENDATIONS  - reviewed chart notes -- hoping to salvage chemo port   - continue daptomycin 500 mg q 48 hours for 6 weeks from culture clearance (11/27-1/7)  -- check CK weekly (last checked 12/2)  - continue ceftriaxone 2g daily -- > can switch to Levofloxacin 500 mg q 48 hours PO (end date 12/8)  - continue flagyl 500 mg q 12 hours PO (end date 12/8)     - Weekly CBC, CMP, ESR/CRP, CK  - ID follow-up with Dr. Reuben Heard for Telehealth. We will call the patient between 10:30-1:30      [address]       [phone]       Fax [phone]    Please call or message on Microsoft Teams if with any questions.  Spectra [phone]

## 2024-12-02 NOTE — PROGRESS NOTE ADULT - SUBJECTIVE AND OBJECTIVE BOX
MARIAM IVORY  69y, Male  Allergy: No Known Allergies      LOS  8d    CHIEF COMPLAINT: Altered mental status (02 Dec 2024 16:54)      INTERVAL EVENTS/HPI  - No acute events overnight  - T(F): , Max: 98.5 (24 @ 19:33)  - abdominal tenderness today   - WBC Count: 12.28 (24 @ 07:06)  WBC Count: 14.90 (24 @ 09:38)     - Creatinine: 3.0 (24 @ 07:06)  Creatinine: 2.8 (24 @ 09:38)       ROS  General: Denies rigors, nightsweats  HEENT: Denies headache, rhinorrhea, sore throat, eye pain  CV: Denies CP, palpitations  PULM: Denies wheezing, hemoptysis  GI: Denies hematemesis, hematochezia, melena  : Denies discharge, hematuria  MSK: Denies arthralgias, myalgias  SKIN: Denies rash, lesions  NEURO: Denies paresthesias, weakness  PSYCH: Denies depression, anxiety    VITALS:  T(F): 98.1, Max: 98.5 (24 @ 19:33)  HR: 102  BP: 104/64  RR: 18Vital Signs Last 24 Hrs  T(C): 36.7 (02 Dec 2024 13:58), Max: 36.9 (01 Dec 2024 19:33)  T(F): 98.1 (02 Dec 2024 13:58), Max: 98.5 (01 Dec 2024 19:33)  HR: 102 (01 Dec 2024 19:33) (102 - 102)  BP: 104/64 (02 Dec 2024 13:58) (101/61 - 104/64)  BP(mean): --  RR: 18 (01 Dec 2024 19:33) (18 - 18)  SpO2: 100% (01 Dec 2024 19:33) (100% - 100%)        PHYSICAL EXAM:  Gen: NAD, resting in bed  HEENT: Normocephalic, atraumatic  Neck: supple, no lymphadenopathy  CV: Regular rate & regular rhythm  Lungs: decreased BS at bases, no fremitus  Abdomen: Soft, BS present  Ext: Warm, well perfused  Neuro: non focal, awake  Skin: no rash, no erythema  Lines: no phlebitis    FH: Non-contributory  Social Hx: Non-contributory    TESTS & MEASUREMENTS:                        8.5    12. )-----------( 195      ( 02 Dec 2024 07:06 )             25.5         127[L]  |  94[L]  |  43[H]  ----------------------------<  179[H]  4.4   |  24  |  3.0[H]    Ca    7.4[L]      02 Dec 2024 07:06  Phos  2.8       Mg     2.1         TPro  7.2  /  Alb  2.3[L]  /  TBili  0.6  /  DBili  x   /  AST  28  /  ALT  14  /  AlkPhos  190[H]        LIVER FUNCTIONS - ( 02 Dec 2024 07:06 )  Alb: 2.3 g/dL / Pro: 7.2 g/dL / ALK PHOS: 190 U/L / ALT: 14 U/L / AST: 28 U/L / GGT: x           Urinalysis Basic - ( 02 Dec 2024 17:02 )    Color: Dark Yellow / Appearance: Clear / S.021 / pH: x  Gluc: x / Ketone: Trace mg/dL  / Bili: Negative / Urobili: 1.0 mg/dL   Blood: x / Protein: 100 mg/dL / Nitrite: Positive   Leuk Esterase: Small / RBC: x / WBC x   Sq Epi: x / Non Sq Epi: x / Bacteria: x        Culture - Blood (collected 24 @ 11:36)  Source: .Blood BLOOD  Preliminary Report (24 @ 22:01):    No growth at 48 Hours    Culture - Blood (collected 24 @ 14:01)  Source: .Blood BLOOD  Preliminary Report (24 @ 01:01):    No growth at 72 Hours    Culture - Blood (collected 24 @ 13:57)  Source: .Blood BLOOD  Preliminary Report (24 @ 23:00):    No growth at 4 days    Culture - Blood (collected 24 @ 16:00)  Source: .Blood BLOOD  Gram Stain (24 @ 20:53):    Growth in anaerobic bottle: Gram Positive Cocci in Pairs and Chains    Growth in aerobic bottle: Gram Positive Cocci in Pairs and Chains  Final Report (24 @ 15:36):    Growth in aerobic and anaerobic bottles: Enterococcus faecium (vancomycin    resistant)    See previous culture [id number]    Culture - Fungal, Body Fluid (collected 24 @ 14:52)  Source: Peritoneal  Preliminary Report (24 @ 08:37):    No growth    Culture - Body Fluid with Gram Stain (collected 24 @ 14:52)  Source: Ascites Fl  Gram Stain (24 @ 16:44):    No polymorphonuclear leukocytes seen    No organisms seen    by cytocentrifuge  Final Report (24 @ 16:44):    Rare Staphylococcus epidermidis  Organism: Staphylococcus epidermidis (24 @ 16:44)  Organism: Staphylococcus epidermidis (24 @ 16:44)      Method Type: BRONWYN      -  Clindamycin: R >4      -  Erythromycin: R >4      -  Gentamicin: S <=4 Should not be used as monotherapy      -  Oxacillin: R >2      -  Penicillin: R >2      -  Rifampin: S <=1 Should not be used as monotherapy      -  Tetracycline: R >8      -  Trimethoprim/Sulfamethoxazole: S <=0.5/9.5      -  Vancomycin: S 1    Culture - Blood (collected 24 @ 15:53)  Source: .Blood BLOOD  Gram Stain (24 @ 11:37):    Growth in anaerobic bottle: Gram Negative Rods  Final Report (24 @ 09:18):    Growth in anaerobic bottle: Escherichia coli    Direct identification is available within approximately 3-5    hours either by Blood Panel Multiplexed PCR or Direct    MALDI-TOF. Details: [email or web address]  Organism: Blood Culture PCR  Escherichia coli (24 @ 09:18)  Organism: Escherichia coli (24 @ 09:18)      Method Type: BRONWYN      -  Ampicillin: S <=8 These ampicillin results predict results for amoxicillin      -  Ampicillin/Sulbactam: S <=4/2      -  Aztreonam: S <=4      -  Cefazolin: S <=2      -  Cefepime: S <=2      -  Cefoxitin: S <=8      -  Ceftriaxone: S <=1      -  Ciprofloxacin: S <=0.25      -  Ertapenem: S <=0.5      -  Gentamicin: S <=2      -  Imipenem: S <=1      -  Levofloxacin: S <=0.5      -  Meropenem: S <=1      -  Piperacillin/Tazobactam: S <=8      -  Tobramycin: S <=2      -  Trimethoprim/Sulfamethoxazole: S <=0.5/9.5  Organism: Blood Culture PCR (24 @ 09:18)      Method Type: PCR      -  Escherichia coli: Detec    Culture - Blood (collected 24 @ 15:34)  Source: .Blood BLOOD  Gram Stain (24 @ 15:28):    Growth in aerobic bottle: Gram positive cocci in pairs    Growth in anaerobic bottle: Gram positive cocci in pairs  Final Report (24 @ 08:57):    Growth in aerobic and anaerobic bottles: Enterococcus faecium (vancomycin    resistant)    Direct identification is available within approximately 3-5    hours either by Blood Panel Multiplexed PCR or Direct    MALDI-TOF. Details: [email or web address]  Organism: Blood Culture PCR  Enterococcus faecium (vancomycin resistant) (24 @ 08:57)  Organism: Enterococcus faecium (vancomycin resistant) (24 @ 08:57)      Method Type: BRONWYN      -  Ampicillin: R >8 Predicts results to ampicillin/sulbactam, amoxacillin-clavulanate and  piperacillin-tazobactam.      -  Daptomycin: SDD 4 The breakpoint for SDD (susceptible dose dependent)is based on a dosage regimen of 8-12 mg/kg administered every 24 h in adults and is intended for serious infections due to E. faecium. Consultation with an infectious diseases specialist is recommended.      -  Linezolid: S <=1      -  Vancomycin: R >16      -  Gentamicin synergy: R >500      -  Streptomycin synergy: S <=1000  Organism: Blood Culture PCR (24 @ 08:57)      Method Type: PCR      -  Enterococcus faecium,VRE: Detec    Urinalysis with Rflx Culture (collected 24 @ 14:37)        Lactate, Blood: 1.7 mmol/L (24 @ 14:01)      INFECTIOUS DISEASES TESTING  Procalcitonin: 0.96 (24 @ 06:15)      INFLAMMATORY MARKERS      RADIOLOGY & ADDITIONAL TESTS:  I have personally reviewed the last available Chest xray  CXR      CT      CARDIOLOGY TESTING  12 Lead ECG:   Ventricular Rate 97 BPM    Atrial Rate 97 BPM    P-R Interval 134 ms    QRS Duration 70 ms    Q-T Interval 350 ms    QTC Calculation(Bazett) 444 ms    P Axis 57 degrees    R Axis -2 degrees    T Axis -26 degrees    Diagnosis Line Normal sinus rhythm  Left ventricular hypertrophy  T wave abnormality, consider inferior ischemia  Abnormal ECG  Confirmed by Renato Melissa ([id number]) on 2024 4:45:10 PM (24 @ 14:47)      MEDICATIONS  cefTRIAXone   IVPB 2000 IV Intermittent every 24 hours  chlorhexidine 2% Cloths 1 Topical <User Schedule>  dextrose 50% Injectable 25 IV Push once  dextrose 50% Injectable 12.5 IV Push once  dextrose 50% Injectable 25 IV Push once  dorzolamide 2% Ophthalmic Solution 1 Right EYE every 8 hours  glucagon  Injectable 1 IntraMuscular once  heparin   Injectable 5000 SubCutaneous every 12 hours  influenza  Vaccine (HIGH DOSE) 0.5 IntraMuscular once  insulin lispro (ADMELOG) corrective regimen sliding scale  SubCutaneous three times a day before meals  lactated ringers. 1000 IV Continuous <Continuous>  lactulose Syrup 20 Oral daily  metroNIDAZOLE    Tablet 500 Oral every 12 hours  midodrine 10 Oral every 8 hours  pantoprazole    Tablet 40 Oral before breakfast  polyethylene glycol 3350 17 Oral daily  prednisoLONE acetate 1% Suspension 1 Right EYE two times a day  sodium bicarbonate 1300 Oral every 8 hours  timolol 0.25% Solution 1 Right EYE two times a day      WEIGHT  Weight (kg): 49.5 (24 @ 01:02)  Creatinine: 3.0 mg/dL (24 @ 07:06)      ANTIBIOTICS:  cefTRIAXone   IVPB 2000 milliGRAM(s) IV Intermittent every 24 hours  metroNIDAZOLE    Tablet 500 milliGRAM(s) Oral every 12 hours      All available historical records have been reviewed

## 2024-12-02 NOTE — CONSULT NOTE ADULT - CONSULT REQUESTED DATE/TIME
02-Dec-2024 16:54
26-Nov-2024 10:52
01-Dec-2024 08:57
25-Nov-2024 11:27
25-Nov-2024 16:19
29-Nov-2024 16:36

## 2024-12-02 NOTE — PROGRESS NOTE ADULT - ATTENDING COMMENTS
68-year-old male with a past medical history of DM, cataracts, neuropathy, and metastatic cholangiocarcinoma (follows at [facility] [place]) presenting for altered mental status. daughter Jazlyn López (physician, [phone]) who reports that patient has had short periods of confusion over the last few months, usually caused by infections (cholangitis, SBP...). This morning, patient woke up and was more lethargic than usual, with delayed responses when spoken to. He has been around sick contacts so they decided to bring him in to r/o infection as cause of lethargy. As per daughter, patient has a peritoneal drain, he drains himself 1L of peritoneal fluid daily. Last MR head to check for mets was done 2 months ago and was negative.       #Metabolic encephalopathy   #VRE and E. coli Bacteremia   #Metastatic cholangiocarcinoma s/p CBD and hepatic duct stenting s/p cholecystoduodenostomy    #Malignant ascites s/p tunneled peritoneal catheter   #Hx of hepatic abscess   - Vitals on arrival: /72, HR 97, T 97, RR 17, SpO2 98% on RA  - On admission WBC 8, lactate 3.9, t bili 1.1, alk phos 258, ammonia 71  - UA negative, RVP negative  - CXR (11.24.24 @ 13:25): Low lung volumes without focal consolidation or pneumothorax.  - CTAP w/ IV Cont (11.24.24 @ 14:43): Large abdominal ascites occupying the entire peritoneum. Questionable reactive changes of the small bowel due to ascites. No bowel obstruction. Drain from the level of the gallbladder to the duodenum with associated metal artifact obscuring evaluation. Biliary duct stents are also noted. Questionable scrotal wall edema. Correlate with physical exam. Mildly nodular liver may be seen with cirrhosis and associated varices as described.  - CT Head No Cont (11.24.24 @ 14:43): Allowing for motion artifact, unremarkable study.  - Blood culture positive for VRE and E coli    - Ascitic studies not consistent with SBP   - Continue ceftriaxone and flagyl   - Continue Dapto (renally dosed)   - CK weekly while on Dapto   - TTE negative   - Cardio consulted for KELVIN - appreciate cardiology recs -- presence of varices, risks potentially outweigh benefits   - Per ID, as unable to rule out endocarditis and with presence of port, would plan daptomycin 500 mg q 48 hours for 6 weeks from culture clearance (11/27-1/7). continue ceftriaxone 2g daily -- > can switch to Levofloxacin 500 mg q 48 hours PO (end date 12/8). continue flagyl 500 mg q 12 hours PO (end date 12/8)   - Remove port if not needed by oncology   - Follow up repeat Bcx until negative     #VIPUL on CKD3  #Metabolic acidosis   - f/u urine studies   - RBUS negative   - c/w IVF   - c/w sodium bicarbonate     #Thrombocytopenia   - resolved     #DM   - On home Farxiga 10mg daily  - ISS for now, target -180  - Monitor FS and adjust accordingly      #DVT ppx: Heparin   #GI ppx: Pantoprazole      Pending: VIPUL, Thrombocytopenia

## 2024-12-02 NOTE — CONSULT NOTE ADULT - SUBJECTIVE AND OBJECTIVE BOX
Gastroenterology Consultation:    Patient is a 69y old  Male who presents with a chief complaint of Altered mental status (02 Dec 2024 09:59)        Admitted on: 11-24-24      HPI:  Patient is a 68-year-old male with a past medical history of DM, cataracts, neuropathy, and [place] [facility] [place] for altered mental status. On my exam, patient is AAOx3, he has some abdominal discomfort with mild pain around the drain he has in RUQ.    Called daughter Jazlyn López (physician, [phone]) who reports that patient has had short periods of confusion over the last few months, usually caused by infections (cholangitis, SBP...). This morning, patient woke up and was more lethargic than usual, with delayed responses when spoken to. He has been around sick contacts so they decided to bring him in to r/o infection as cause of lethargy. As per daughter, patient has a peritoneal drain, he drains himself 1L of peritoneal fluid daily. Last MR head to check for mets was done 2 months ago and was negative.   No fever/chills, no vomiting, no urinary sx or diarrhea at home.     Vitals: /72, HR 97, T 97, RR 17, SpO2 98% on RA  Labs: WBC 8, Hgb 8, Na 132, Crea 1.8, alk phos 258, lactate 3.9, ammonia 71,   UA negative  RVP negative  Imaging:  - CXR (11.24.24 @ 13:25): Low lung volumes without focal consolidation or pneumothorax.  - CTAP w/ IV Cont (11.24.24 @ 14:43): Large abdominal ascites occupying the entire peritoneum. Questionable reactive changes of the small bowel due to ascites. No bowel obstruction. Drain from the level of the gallbladder to the duodenum with associated metal artifact obscuring evaluation. Biliary duct stents are also noted. Questionable scrotal wall edema. Correlate with physical exam. Mildly nodular liver may be seen with cirrhosis and associated varices as described.  - CT Head No Cont (11.24.24 @ 14:43): Allowing for motion artifact, unremarkable study.    In the ED, patient received Ciprofloxacin 400mg, Flagyl 500mg, LR 1L bolus and was started on lactulose.    (24 Nov 2024 20:15)        Prior EGD:    Prior Colonoscopy:      PAST MEDICAL & SURGICAL HISTORY:  History of medical problems  dm type 2, bile duct cancer on chemo, cataracts, gerd, neuropathy (crawling sensation)      H/O inguinal hernia repair  right groin            FAMILY HISTORY:      Social History:  Tobacco:  Alcohol:  Drugs:    Home Medications:  dorzolamide 2% ophthalmic solution: 1 drop(s) in each eye 2 times a day to right eye (24 Nov 2024 22:08)  ergocalciferol 1.25 mg (50,000 intl units) oral tablet: orally once a day (24 Nov 2024 22:08)  Farxiga 10 mg oral tablet: 1 tab(s) orally once a day (24 Nov 2024 22:08)  Megace 20 mg oral tablet: 1 tab(s) orally once a day (24 Nov 2024 22:08)  MiraLax oral powder for reconstitution: 17 gram(s) orally once a day (24 Nov 2024 22:08)  Prednisol 1% ophthalmic solution: 1 drop(s) in each affected eye 2 times a day to right eye (24 Nov 2024 22:08)  Protonix 40 mg oral delayed release tablet: 1 tab(s) orally once a day (24 Nov 2024 22:08)  timolol hemihydrate 0.25% ophthalmic solution: 1 drop(s) in each affected eye 2 times a day to right eye (24 Nov 2024 22:08)        MEDICATIONS  (STANDING):  cefTRIAXone   IVPB 2000 milliGRAM(s) IV Intermittent every 24 hours  chlorhexidine 2% Cloths 1 Application(s) Topical <User Schedule>  dextrose 50% Injectable 25 Gram(s) IV Push once  dextrose 50% Injectable 12.5 Gram(s) IV Push once  dextrose 50% Injectable 25 Gram(s) IV Push once  dorzolamide 2% Ophthalmic Solution 1 Drop(s) Right EYE every 8 hours  glucagon  Injectable 1 milliGRAM(s) IntraMuscular once  heparin   Injectable 5000 Unit(s) SubCutaneous every 12 hours  influenza  Vaccine (HIGH DOSE) 0.5 milliLiter(s) IntraMuscular once  insulin lispro (ADMELOG) corrective regimen sliding scale   SubCutaneous three times a day before meals  lactated ringers. 1000 milliLiter(s) (75 mL/Hr) IV Continuous <Continuous>  lactulose Syrup 20 Gram(s) Oral daily  metroNIDAZOLE    Tablet 500 milliGRAM(s) Oral every 12 hours  midodrine 10 milliGRAM(s) Oral every 8 hours  pantoprazole    Tablet 40 milliGRAM(s) Oral before breakfast  polyethylene glycol 3350 17 Gram(s) Oral daily  prednisoLONE acetate 1% Suspension 1 Drop(s) Right EYE two times a day  sodium bicarbonate 1300 milliGRAM(s) Oral every 8 hours  timolol 0.25% Solution 1 Drop(s) Right EYE two times a day    MEDICATIONS  (PRN):  dextrose Oral Gel 15 Gram(s) Oral once PRN Blood Glucose LESS THAN 70 milliGRAM(s)/deciliter  melatonin 3 milliGRAM(s) Oral at bedtime PRN Insomnia  oxyCODONE    IR 5 milliGRAM(s) Oral every 6 hours PRN Moderate Pain (4 - 6)      Allergies  No Known Allergies      Review of Systems:   Constitutional:  No Fever, No Chills  ENT/Mouth:  No Hearing Changes,  No Difficulty Swallowing  Eyes:  No Eye Pain, No Vision Changes  Cardiovascular:  No Chest Pain, No Palpitations  Respiratory:  No Cough, No Dyspnea  Gastrointestinal:  As described in HPI          Physical Examination:  T(C): 36.7 (12-02-24 @ 13:58), Max: 36.9 (12-01-24 @ 19:33)  HR: 102 (12-01-24 @ 19:33) (102 - 102)  BP: 104/64 (12-02-24 @ 13:58) (101/61 - 104/64)  RR: 18 (12-01-24 @ 19:33) (18 - 18)  SpO2: 100% (12-01-24 @ 19:33) (100% - 100%)      11-30-24 @ 07:01  -  12-01-24 @ 07:00  --------------------------------------------------------  IN: 0 mL / OUT: 800 mL / NET: -800 mL    12-01-24 @ 07:01  -  12-02-24 @ 07:00  --------------------------------------------------------  IN: 0 mL / OUT: 550 mL / NET: -550 mL          GENERAL: AAOx3, no acute distress.  HEAD:  Atraumatic, Normocephalic  EYES: conjunctiva and sclera clear  CHEST/LUNG: Clear to auscultation bilaterally; No wheeze, rhonchi, or rales  HEART: Regular rate and rhythm; normal S1, S2, No murmurs.  ABDOMEN: Soft, nontender, nondistended; Bowel sounds present  SKIN: Intact, no jaundice        Data:                        8.5    12.28 )-----------( 195      ( 02 Dec 2024 07:06 )             25.5     Hgb Trend:  8.5  12-02-24 @ 07:06  7.9  12-01-24 @ 09:38  9.2  11-30-24 @ 09:00        12-02    127[L]  |  94[L]  |  43[H]  ----------------------------<  179[H]  4.4   |  24  |  3.0[H]    Ca    7.4[L]      02 Dec 2024 07:06  Phos  2.8     12-01  Mg     2.1     12-01    TPro  7.2  /  Alb  2.3[L]  /  TBili  0.6  /  DBili  x   /  AST  28  /  ALT  14  /  AlkPhos  190[H]  12-02    Liver panel trend:  TBili 0.6   /   AST 28   /   ALT 14   /   AlkP 190   /   Tptn 7.2   /   Alb 2.3    /   DBili --      12-02  TBili 0.4   /   AST 33   /   ALT 13   /   AlkP 201   /   Tptn 6.5   /   Alb 1.8    /   DBili --      12-01  TBili 0.4   /   AST 34   /   ALT 16   /   AlkP 223   /   Tptn 7.1   /   Alb 1.9    /   DBili --      11-30  TBili 0.5   /   AST 47   /   ALT 16   /   AlkP 243   /   Tptn 7.2   /   Alb 2.0    /   DBili --      11-29  TBili 0.6   /   AST 40   /   ALT 19   /   AlkP 275   /   Tptn 7.7   /   Alb 2.2    /   DBili --      11-28  TBili 0.6   /   AST 32   /   ALT 15   /   AlkP 234   /   Tptn 6.7   /   Alb 1.9    /   DBili --      11-27  TBili 0.7   /   AST 33   /   ALT 15   /   AlkP 250   /   Tptn 7.2   /   Alb 2.0    /   DBili -- 11-25  TBili 1.1   /   AST 37   /   ALT 16   /   AlkP 258   /   Tptn 7.7   /   Alb 2.1    /   DBili --      11-24              Radiology:       Gastroenterology Consultation:    Patient is a 69y old  Male who presents with a chief complaint of Altered mental status (02 Dec 2024 09:59)        Admitted on: 11-24-24      HPI:  68-year-old male with a past medical history of DM, cataracts, neuropathy, and metastatic cholangiocarcinoma (follows at [facility]), hx of SBP, cholangitis presenting for altered mental status, admitted to medicine with encephalopathy work up. Hepatology consulted for cirrhosis hx . Patient was seen by advance GI. Currently patient reports he was known to have cirrhosis, aax3 but has mild asterixis      Prior EGD:    Prior Colonoscopy:      PAST MEDICAL & SURGICAL HISTORY:  History of medical problems  dm type 2, bile duct cancer on chemo, cataracts, gerd, neuropathy (crawling sensation)      H/O inguinal hernia repair  right groin            FAMILY HISTORY:      Social History:  Tobacco: denies  Alcohol: denies  Drugs: denies    Home Medications:  dorzolamide 2% ophthalmic solution: 1 drop(s) in each eye 2 times a day to right eye (24 Nov 2024 22:08)  ergocalciferol 1.25 mg (50,000 intl units) oral tablet: orally once a day (24 Nov 2024 22:08)  Farxiga 10 mg oral tablet: 1 tab(s) orally once a day (24 Nov 2024 22:08)  Megace 20 mg oral tablet: 1 tab(s) orally once a day (24 Nov 2024 22:08)  MiraLax oral powder for reconstitution: 17 gram(s) orally once a day (24 Nov 2024 22:08)  Prednisol 1% ophthalmic solution: 1 drop(s) in each affected eye 2 times a day to right eye (24 Nov 2024 22:08)  Protonix 40 mg oral delayed release tablet: 1 tab(s) orally once a day (24 Nov 2024 22:08)  timolol hemihydrate 0.25% ophthalmic solution: 1 drop(s) in each affected eye 2 times a day to right eye (24 Nov 2024 22:08)        MEDICATIONS  (STANDING):  cefTRIAXone   IVPB 2000 milliGRAM(s) IV Intermittent every 24 hours  chlorhexidine 2% Cloths 1 Application(s) Topical <User Schedule>  dextrose 50% Injectable 25 Gram(s) IV Push once  dextrose 50% Injectable 12.5 Gram(s) IV Push once  dextrose 50% Injectable 25 Gram(s) IV Push once  dorzolamide 2% Ophthalmic Solution 1 Drop(s) Right EYE every 8 hours  glucagon  Injectable 1 milliGRAM(s) IntraMuscular once  heparin   Injectable 5000 Unit(s) SubCutaneous every 12 hours  influenza  Vaccine (HIGH DOSE) 0.5 milliLiter(s) IntraMuscular once  insulin lispro (ADMELOG) corrective regimen sliding scale   SubCutaneous three times a day before meals  lactated ringers. 1000 milliLiter(s) (75 mL/Hr) IV Continuous <Continuous>  lactulose Syrup 20 Gram(s) Oral daily  metroNIDAZOLE    Tablet 500 milliGRAM(s) Oral every 12 hours  midodrine 10 milliGRAM(s) Oral every 8 hours  pantoprazole    Tablet 40 milliGRAM(s) Oral before breakfast  polyethylene glycol 3350 17 Gram(s) Oral daily  prednisoLONE acetate 1% Suspension 1 Drop(s) Right EYE two times a day  sodium bicarbonate 1300 milliGRAM(s) Oral every 8 hours  timolol 0.25% Solution 1 Drop(s) Right EYE two times a day    MEDICATIONS  (PRN):  dextrose Oral Gel 15 Gram(s) Oral once PRN Blood Glucose LESS THAN 70 milliGRAM(s)/deciliter  melatonin 3 milliGRAM(s) Oral at bedtime PRN Insomnia  oxyCODONE    IR 5 milliGRAM(s) Oral every 6 hours PRN Moderate Pain (4 - 6)      Allergies  No Known Allergies      Review of Systems:   Constitutional:  No Fever, No Chills  ENT/Mouth:  No Hearing Changes,  No Difficulty Swallowing  Eyes:  No Eye Pain, No Vision Changes  Cardiovascular:  No Chest Pain, No Palpitations  Respiratory:  No Cough, No Dyspnea  Gastrointestinal:  As described in HPI          Physical Examination:  T(C): 36.7 (12-02-24 @ 13:58), Max: 36.9 (12-01-24 @ 19:33)  HR: 102 (12-01-24 @ 19:33) (102 - 102)  BP: 104/64 (12-02-24 @ 13:58) (101/61 - 104/64)  RR: 18 (12-01-24 @ 19:33) (18 - 18)  SpO2: 100% (12-01-24 @ 19:33) (100% - 100%)      11-30-24 @ 07:01  -  12-01-24 @ 07:00  --------------------------------------------------------  IN: 0 mL / OUT: 800 mL / NET: -800 mL    12-01-24 @ 07:01  -  12-02-24 @ 07:00  --------------------------------------------------------  IN: 0 mL / OUT: 550 mL / NET: -550 mL          GENERAL: AAOx3, no acute distress.  HEAD:  Atraumatic, Normocephalic  EYES: conjunctiva and sclera clear  CHEST/LUNG: Clear to auscultation bilaterally; No wheeze, rhonchi, or rales  HEART: Regular rate and rhythm; normal S1, S2, No murmurs.  ABDOMEN: Soft, nontender, nondistended; Bowel sounds present  SKIN: Intact, no jaundice  mild asterixis+        Data:                        8.5    12.28 )-----------( 195      ( 02 Dec 2024 07:06 )             25.5     Hgb Trend:  8.5  12-02-24 @ 07:06  7.9  12-01-24 @ 09:38  9.2  11-30-24 @ 09:00        12-02    127[L]  |  94[L]  |  43[H]  ----------------------------<  179[H]  4.4   |  24  |  3.0[H]    Ca    7.4[L]      02 Dec 2024 07:06  Phos  2.8     12-01  Mg     2.1     12-01    TPro  7.2  /  Alb  2.3[L]  /  TBili  0.6  /  DBili  x   /  AST  28  /  ALT  14  /  AlkPhos  190[H]  12-02    Liver panel trend:  TBili 0.6   /   AST 28   /   ALT 14   /   AlkP 190   /   Tptn 7.2   /   Alb 2.3    /   DBili --      12-02  TBili 0.4   /   AST 33   /   ALT 13   /   AlkP 201   /   Tptn 6.5   /   Alb 1.8    /   DBili --      12-01  TBili 0.4   /   AST 34   /   ALT 16   /   AlkP 223   /   Tptn 7.1   /   Alb 1.9    /   DBili --      11-30  TBili 0.5   /   AST 47   /   ALT 16   /   AlkP 243   /   Tptn 7.2   /   Alb 2.0    /   DBili --      11-29  TBili 0.6   /   AST 40   /   ALT 19   /   AlkP 275   /   Tptn 7.7   /   Alb 2.2    /   DBili --      11-28  TBili 0.6   /   AST 32   /   ALT 15   /   AlkP 234   /   Tptn 6.7   /   Alb 1.9    /   DBili --      11-27  TBili 0.7   /   AST 33   /   ALT 15   /   AlkP 250   /   Tptn 7.2   /   Alb 2.0    /   DBili --      11-25  TBili 1.1   /   AST 37   /   ALT 16   /   AlkP 258   /   Tptn 7.7   /   Alb 2.1    /   DBili --      11-24              Radiology:  < from: CT Abdomen and Pelvis w/ IV Cont (11.24.24 @ 14:43) >  Large abdominal ascites occupying the entire peritoneum. Questionable   reactive changes of the small bowel due to ascites. No bowel obstruction.    Drain from the level of the gallbladder to the duodenum with associated  metal artifact obscuring evaluation. Biliary duct stents are also noted.    Questionable scrotal wall edema. Correlate with physical exam.    Mildly nodular liver may be seen with cirrhosis and associated varices as   described.    < end of copied text >

## 2024-12-02 NOTE — PROGRESS NOTE ADULT - SUBJECTIVE AND OBJECTIVE BOX
SUBJECTIVE / OVERNIGHT EVENTS  Patient slept well overnight. No acute events this AM. Complains of generalized itchiness & lack of sleep.    MEDICATIONS  cefTRIAXone   IVPB 2000 milliGRAM(s) IV Intermittent every 24 hours  chlorhexidine 2% Cloths 1 Application(s) Topical <User Schedule>  dextrose 5% 1000 milliLiter(s) IV Continuous <Continuous>  dextrose 50% Injectable 25 Gram(s) IV Push once  dextrose 50% Injectable 12.5 Gram(s) IV Push once  dextrose 50% Injectable 25 Gram(s) IV Push once  dorzolamide 2% Ophthalmic Solution 1 Drop(s) Right EYE every 8 hours  glucagon  Injectable 1 milliGRAM(s) IntraMuscular once  influenza  Vaccine (HIGH DOSE) 0.5 milliLiter(s) IntraMuscular once  insulin lispro (ADMELOG) corrective regimen sliding scale   SubCutaneous three times a day before meals  lactulose Syrup 20 Gram(s) Oral daily  metroNIDAZOLE    Tablet 500 milliGRAM(s) Oral every 12 hours  midodrine 10 milliGRAM(s) Oral every 8 hours  pantoprazole    Tablet 40 milliGRAM(s) Oral before breakfast  polyethylene glycol 3350 17 Gram(s) Oral daily  prednisoLONE acetate 1% Suspension 1 Drop(s) Right EYE two times a day  sodium bicarbonate 1300 milliGRAM(s) Oral every 8 hours  timolol 0.25% Solution 1 Drop(s) Right EYE two times a day    dextrose Oral Gel 15 Gram(s) Oral once PRN Blood Glucose LESS THAN 70 milliGRAM(s)/deciliter  melatonin 3 milliGRAM(s) Oral at bedtime PRN Insomnia  oxyCODONE    IR 5 milliGRAM(s) Oral every 6 hours PRN Moderate Pain (4 - 6)    VITALS /  EXAM    T(C): 36.9 (12-01-24 @ 19:33), Max: 36.9 (12-01-24 @ 19:33)  HR: 102 (12-01-24 @ 19:33) (89 - 102)  BP: 101/61 (12-01-24 @ 19:33) (84/56 - 110/70)  RR: 18 (12-01-24 @ 19:33) (16 - 18)  SpO2: 100% (12-01-24 @ 19:33) (98% - 100%)  POCT Blood Glucose.: 201 mg/dL (12-02-24 @ 07:43)  POCT Blood Glucose.: 244 mg/dL (12-01-24 @ 21:16)  POCT Blood Glucose.: 151 mg/dL (12-01-24 @ 16:50)  POCT Blood Glucose.: 192 mg/dL (12-01-24 @ 13:51)  POCT Blood Glucose.: 223 mg/dL (12-01-24 @ 11:18)    GENERAL: NAD, well-developed  CHEST/LUNG: Clear to auscultation bilaterally; No wheezes, rales or rhonchi  HEART: Regular rate and rhythm; No murmurs, rubs, or gallops  ABDOMEN: Soft, Nontender, Nondistended; Bowel sounds present, no masses.  EXTREMITIES:  2+ Peripheral Pulses, No clubbing, cyanosis, or edema    I's & O's     12-01-24 @ 07:01  -  12-02-24 @ 07:00  --------------------------------------------------------  IN:  Total IN: 0 mL    OUT:    Drain (mL): 550 mL  Total OUT: 550 mL    Total NET: -550 mL        LABS             8.5    12.28 )-----------( 195      ( 12-02-24 @ 07:06 )             25.5     127  |  94  |  43  -------------------------<  179   12-02-24 @ 07:06  4.4  |  24  |  3.0    Ca      7.4     12-02-24 @ 07:06  Phos   2.8     12-01-24 @ 09:38  Mg     2.1     12-01-24 @ 09:38    TPro  7.2  /  Alb  2.3  /  TBili  0.6  /  DBili  x   /  AST  28  /  ALT  14  /  AlkPhos  190  /  GGT  x     12-02-24 @ 07:06                    Urinalysis Basic - ( 02 Dec 2024 07:06 )    Color: x / Appearance: x / SG: x / pH: x  Gluc: 179 mg/dL / Ketone: x  / Bili: x / Urobili: x   Blood: x / Protein: x / Nitrite: x   Leuk Esterase: x / RBC: x / WBC x   Sq Epi: x / Non Sq Epi: x / Bacteria: x      MICRO / IMAGING / CARDIOLOGY  Telemetry: Reviewed   EKG: Reviewed    CULTURES    Culture - Blood (collected 11-29-24 @ 11:36)  Source: .Blood BLOOD  Preliminary Report:    No growth at 48 Hours    Culture - Blood (collected 11-28-24 @ 14:01)  Source: .Blood BLOOD  Preliminary Report:    No growth at 72 Hours    Culture - Blood (collected 11-27-24 @ 13:57)  Source: .Blood BLOOD  Preliminary Report:    No growth at 4 days      IMAGING  PACS Image:  (12-01-24 @ 16:32)  PACS Image:  (11-30-24 @ 18:11)    CARDIOLOGY

## 2024-12-02 NOTE — CHART NOTE - NSCHARTNOTEFT_GEN_A_CORE
Received a call from Dr. Littlejohn (oncologist) office regarding port removal.  They suggested attempting to salvage the port if possible.  Remove port only if clinically necessary, patient is septic or if the patients condition deteriorates and he would no longer benefit from chemo (ie he requires hospice after D/C)  If port is removed, please update the office at [phone] (mon-Fri 9-5)

## 2024-12-03 LAB
ALBUMIN SERPL ELPH-MCNC: 2.3 G/DL — LOW (ref 3.5–5.2)
ALP SERPL-CCNC: 180 U/L — HIGH (ref 30–115)
ALT FLD-CCNC: 12 U/L — SIGNIFICANT CHANGE UP (ref 0–41)
ANION GAP SERPL CALC-SCNC: 9 MMOL/L — SIGNIFICANT CHANGE UP (ref 7–14)
APPEARANCE UR: ABNORMAL
AST SERPL-CCNC: 29 U/L — SIGNIFICANT CHANGE UP (ref 0–41)
BASOPHILS # BLD AUTO: 0.1 K/UL — SIGNIFICANT CHANGE UP (ref 0–0.2)
BASOPHILS NFR BLD AUTO: 0.8 % — SIGNIFICANT CHANGE UP (ref 0–1)
BILIRUB SERPL-MCNC: 0.7 MG/DL — SIGNIFICANT CHANGE UP (ref 0.2–1.2)
BILIRUB UR-MCNC: NEGATIVE — SIGNIFICANT CHANGE UP
BUN SERPL-MCNC: 50 MG/DL — HIGH (ref 10–20)
CALCIUM SERPL-MCNC: 7.7 MG/DL — LOW (ref 8.4–10.5)
CHLORIDE SERPL-SCNC: 96 MMOL/L — LOW (ref 98–110)
CO2 SERPL-SCNC: 24 MMOL/L — SIGNIFICANT CHANGE UP (ref 17–32)
COLOR SPEC: SIGNIFICANT CHANGE UP
CREAT SERPL-MCNC: 3 MG/DL — HIGH (ref 0.7–1.5)
DIFF PNL FLD: ABNORMAL
EGFR: 22 ML/MIN/1.73M2 — LOW
EOSINOPHIL # BLD AUTO: 0.49 K/UL — SIGNIFICANT CHANGE UP (ref 0–0.7)
EOSINOPHIL NFR BLD AUTO: 4.1 % — SIGNIFICANT CHANGE UP (ref 0–8)
GLUCOSE BLDC GLUCOMTR-MCNC: 163 MG/DL — HIGH (ref 70–99)
GLUCOSE BLDC GLUCOMTR-MCNC: 168 MG/DL — HIGH (ref 70–99)
GLUCOSE BLDC GLUCOMTR-MCNC: 174 MG/DL — HIGH (ref 70–99)
GLUCOSE BLDC GLUCOMTR-MCNC: 237 MG/DL — HIGH (ref 70–99)
GLUCOSE SERPL-MCNC: 175 MG/DL — HIGH (ref 70–99)
GLUCOSE UR QL: NEGATIVE MG/DL — SIGNIFICANT CHANGE UP
HCT VFR BLD CALC: 27 % — LOW (ref 42–52)
HEPARIN-PF4 AB RESULT: <0.6 U/ML — SIGNIFICANT CHANGE UP (ref 0–0.9)
HGB BLD-MCNC: 9.1 G/DL — LOW (ref 14–18)
IMM GRANULOCYTES NFR BLD AUTO: 0.9 % — HIGH (ref 0.1–0.3)
KETONES UR-MCNC: ABNORMAL MG/DL
LEUKOCYTE ESTERASE UR-ACNC: ABNORMAL
LYMPHOCYTES # BLD AUTO: 1.6 K/UL — SIGNIFICANT CHANGE UP (ref 1.2–3.4)
LYMPHOCYTES # BLD AUTO: 13.5 % — LOW (ref 20.5–51.1)
MCHC RBC-ENTMCNC: 29.1 PG — SIGNIFICANT CHANGE UP (ref 27–31)
MCHC RBC-ENTMCNC: 33.7 G/DL — SIGNIFICANT CHANGE UP (ref 32–37)
MCV RBC AUTO: 86.3 FL — SIGNIFICANT CHANGE UP (ref 80–94)
MONOCYTES # BLD AUTO: 1.37 K/UL — HIGH (ref 0.1–0.6)
MONOCYTES NFR BLD AUTO: 11.6 % — HIGH (ref 1.7–9.3)
NEUTROPHILS # BLD AUTO: 8.17 K/UL — HIGH (ref 1.4–6.5)
NEUTROPHILS NFR BLD AUTO: 69.1 % — SIGNIFICANT CHANGE UP (ref 42.2–75.2)
NITRITE UR-MCNC: NEGATIVE — SIGNIFICANT CHANGE UP
NRBC # BLD: 0 /100 WBCS — SIGNIFICANT CHANGE UP (ref 0–0)
PF4 HEPARIN CMPLX AB SER-ACNC: NEGATIVE — SIGNIFICANT CHANGE UP
PH UR: 5.5 — SIGNIFICANT CHANGE UP (ref 5–8)
PLATELET # BLD AUTO: 262 K/UL — SIGNIFICANT CHANGE UP (ref 130–400)
PMV BLD: 11.2 FL — HIGH (ref 7.4–10.4)
POTASSIUM SERPL-MCNC: 4.7 MMOL/L — SIGNIFICANT CHANGE UP (ref 3.5–5)
POTASSIUM SERPL-SCNC: 4.7 MMOL/L — SIGNIFICANT CHANGE UP (ref 3.5–5)
PROT SERPL-MCNC: 6.8 G/DL — SIGNIFICANT CHANGE UP (ref 6–8)
PROT UR-MCNC: 100 MG/DL
RBC # BLD: 3.13 M/UL — LOW (ref 4.7–6.1)
RBC # FLD: 17.2 % — HIGH (ref 11.5–14.5)
SODIUM SERPL-SCNC: 129 MMOL/L — LOW (ref 135–146)
SP GR SPEC: 1.02 — SIGNIFICANT CHANGE UP (ref 1–1.03)
UROBILINOGEN FLD QL: 1 MG/DL — SIGNIFICANT CHANGE UP (ref 0.2–1)
WBC # BLD: 11.84 K/UL — HIGH (ref 4.8–10.8)
WBC # FLD AUTO: 11.84 K/UL — HIGH (ref 4.8–10.8)

## 2024-12-03 PROCEDURE — 99232 SBSQ HOSP IP/OBS MODERATE 35: CPT

## 2024-12-03 PROCEDURE — 76770 US EXAM ABDO BACK WALL COMP: CPT | Mod: 26

## 2024-12-03 PROCEDURE — 99233 SBSQ HOSP IP/OBS HIGH 50: CPT

## 2024-12-03 RX ORDER — LEVOFLOXACIN 250 MG/1
500 TABLET, FILM COATED ORAL
Refills: 0 | Status: DISCONTINUED | OUTPATIENT
Start: 2024-12-03 | End: 2024-12-04

## 2024-12-03 RX ORDER — METRONIDAZOLE 500 MG/1
500 TABLET ORAL EVERY 12 HOURS
Refills: 0 | Status: DISCONTINUED | OUTPATIENT
Start: 2024-12-03 | End: 2024-12-04

## 2024-12-03 RX ORDER — SODIUM BICARBONATE 84 MG/ML
650 INJECTION, SOLUTION INTRAVENOUS EVERY 8 HOURS
Refills: 0 | Status: DISCONTINUED | OUTPATIENT
Start: 2024-12-03 | End: 2024-12-04

## 2024-12-03 RX ORDER — DAPTOMYCIN 500 MG/10ML
500 INJECTION, POWDER, LYOPHILIZED, FOR SOLUTION INTRAVENOUS
Refills: 0 | Status: DISCONTINUED | OUTPATIENT
Start: 2024-12-03 | End: 2024-12-04

## 2024-12-03 RX ORDER — SODIUM BICARBONATE 84 MG/ML
1300 INJECTION, SOLUTION INTRAVENOUS EVERY 12 HOURS
Refills: 0 | Status: DISCONTINUED | OUTPATIENT
Start: 2024-12-03 | End: 2024-12-03

## 2024-12-03 RX ORDER — ONDANSETRON HYDROCHLORIDE 4 MG/1
4 TABLET, FILM COATED ORAL ONCE
Refills: 0 | Status: COMPLETED | OUTPATIENT
Start: 2024-12-03 | End: 2024-12-03

## 2024-12-03 RX ADMIN — PREDNISOLONE ACETATE 1 DROP(S): 1.2 SUSPENSION/ DROPS OPHTHALMIC at 18:13

## 2024-12-03 RX ADMIN — MIDODRINE HYDROCHLORIDE 10 MILLIGRAM(S): 5 TABLET ORAL at 13:29

## 2024-12-03 RX ADMIN — PREDNISOLONE ACETATE 1 DROP(S): 1.2 SUSPENSION/ DROPS OPHTHALMIC at 06:14

## 2024-12-03 RX ADMIN — SODIUM BICARBONATE 1300 MILLIGRAM(S): 84 INJECTION, SOLUTION INTRAVENOUS at 06:11

## 2024-12-03 RX ADMIN — ONDANSETRON HYDROCHLORIDE 4 MILLIGRAM(S): 4 TABLET, FILM COATED ORAL at 19:03

## 2024-12-03 RX ADMIN — Medication 1 DROP(S): at 18:13

## 2024-12-03 RX ADMIN — DORZOLAMIDE HYDROCHLORIDE 1 DROP(S): 20 SOLUTION OPHTHALMIC at 13:31

## 2024-12-03 RX ADMIN — LEVOFLOXACIN 500 MILLIGRAM(S): 250 TABLET, FILM COATED ORAL at 13:30

## 2024-12-03 RX ADMIN — SODIUM BICARBONATE 650 MILLIGRAM(S): 84 INJECTION, SOLUTION INTRAVENOUS at 18:17

## 2024-12-03 RX ADMIN — OXYCODONE HYDROCHLORIDE 5 MILLIGRAM(S): 30 TABLET ORAL at 13:00

## 2024-12-03 RX ADMIN — POLYETHYLENE GLYCOL 3350 17 GRAM(S): 17 POWDER, FOR SOLUTION ORAL at 12:01

## 2024-12-03 RX ADMIN — Medication 1: at 12:00

## 2024-12-03 RX ADMIN — Medication 2: at 17:43

## 2024-12-03 RX ADMIN — MIDODRINE HYDROCHLORIDE 10 MILLIGRAM(S): 5 TABLET ORAL at 21:20

## 2024-12-03 RX ADMIN — DAPTOMYCIN 120 MILLIGRAM(S): 500 INJECTION, POWDER, LYOPHILIZED, FOR SOLUTION INTRAVENOUS at 21:23

## 2024-12-03 RX ADMIN — Medication 1: at 09:05

## 2024-12-03 RX ADMIN — DORZOLAMIDE HYDROCHLORIDE 1 DROP(S): 20 SOLUTION OPHTHALMIC at 21:18

## 2024-12-03 RX ADMIN — LACTULOSE 20 GRAM(S): 10 SOLUTION ORAL at 12:01

## 2024-12-03 RX ADMIN — Medication 1 DROP(S): at 06:15

## 2024-12-03 RX ADMIN — SODIUM BICARBONATE 650 MILLIGRAM(S): 84 INJECTION, SOLUTION INTRAVENOUS at 21:20

## 2024-12-03 RX ADMIN — OXYCODONE HYDROCHLORIDE 5 MILLIGRAM(S): 30 TABLET ORAL at 12:01

## 2024-12-03 RX ADMIN — Medication 5000 UNIT(S): at 18:12

## 2024-12-03 RX ADMIN — PANTOPRAZOLE SODIUM 40 MILLIGRAM(S): 40 TABLET, DELAYED RELEASE ORAL at 06:11

## 2024-12-03 RX ADMIN — MIDODRINE HYDROCHLORIDE 10 MILLIGRAM(S): 5 TABLET ORAL at 06:11

## 2024-12-03 RX ADMIN — METRONIDAZOLE 500 MILLIGRAM(S): 500 TABLET ORAL at 06:11

## 2024-12-03 RX ADMIN — CHLORHEXIDINE GLUCONATE 1 APPLICATION(S): 1.2 RINSE ORAL at 06:21

## 2024-12-03 RX ADMIN — Medication 5000 UNIT(S): at 06:11

## 2024-12-03 RX ADMIN — METRONIDAZOLE 500 MILLIGRAM(S): 500 TABLET ORAL at 18:12

## 2024-12-03 RX ADMIN — DORZOLAMIDE HYDROCHLORIDE 1 DROP(S): 20 SOLUTION OPHTHALMIC at 06:15

## 2024-12-03 NOTE — PROGRESS NOTE ADULT - ASSESSMENT
Patient is a 68-year-old male with a past medical history of DM, cataracts, neuropathy, and [place] [facility] [place] for altered [place] status  # VIPUL prerenal vs ATN   # hyponatremia  # metastatic cholangioKc  # anemia chronic   # VRE and E fecalis bacteremia   - cr stable   - oligoanuric s/p rojas placement   -U sodium < 20 in favor of prerenal versus HRS s/p albumin   - continue  LR at 75  cc per hour  - ph at goal/ no binders   - sodium level trending up   - on dapto follow ck levels   - continue sodium bicarb po/ decrease to 650 q8   -  BP on the low side, continue midodrine   - no need for RRT today however if no improvement might need to start HD   will follow

## 2024-12-03 NOTE — PHARMACOTHERAPY INTERVENTION NOTE - COMMENTS
Modified metronidazole order so last day of therapy is 12/8 and daptomycin order so last day is 1/7 per Dr. Alberts's recommendations.

## 2024-12-03 NOTE — PROCEDURE NOTE - NSPROCDETAILS_GEN_ALL_CORE
sterile technique, indwelling urinary device inserted/sterile technique, non-indwelling urinary device inserted/sterile technique, old cysto removed, new tube inserted/a urinary catheter insertion kit was used for all insertion materials

## 2024-12-03 NOTE — PROGRESS NOTE ADULT - ASSESSMENT
68-year-old male with a past medical history of DM, cataracts, neuropathy, and metastatic cholangiocarcinoma (follows at [facility] [place]) presenting for altered mental status. daughter Jazlyn López (physician, [phone]) who reports that patient has had short periods of confusion over the last few months, usually caused by infections (cholangitis, SBP...). This morning, patient woke up and was more lethargic than usual, with delayed responses when spoken to. He has been around sick contacts so they decided to bring him in to r/o infection as cause of lethargy. As per daughter, patient has a peritoneal drain, he drains himself 1L of peritoneal fluid daily. Last MR head to check for mets was done 2 months ago and was negative.     #Lethargy   #Metabolic encephalopathy   #VRE and E. coli Bacteremia   #Metastatic cholangiocarcinoma s/p CBD and hepatic duct stenting s/p cholecystoduodenostomy s/p peritoneal drain (currently in place)  #Large abdominal ascites   #Hx of hepatic abscess   * Follows at [facility] in [place] since May 2022 for cholangiocarcinoma  * Follows with Dr Sanchez ([facility]) and Dr. Littlejohn (oncologist) at [facility] [place]  * Deemed not a surgical candidate because of extensive metastatic disease  * Pt has a peritoneal drain in place and drains himself 1L of peritoneal fluid daily   * Last MR Head around 2 months ago, no mets  - Vitals on arrival: /72, HR 97, T 97, RR 17, SpO2 98% on RA  - On admission WBC 8, lactate 3.9, t bili 1.1, alk phos 258, ammonia 71  - UA negative, RVP negative  - CXR (11.24.24 @ 13:25): Low lung volumes without focal consolidation or pneumothorax.  - CTAP w/ IV Cont (11.24.24 @ 14:43): Large abdominal ascites occupying the entire peritoneum. Questionable reactive changes of the small bowel due to ascites. No bowel obstruction. Drain from the level of the gallbladder to the duodenum with associated metal artifact obscuring evaluation. Biliary duct stents are also noted. Questionable scrotal wall edema. Correlate with physical exam. Mildly nodular liver may be seen with cirrhosis and associated varices as described.  - CT Head No Cont (11.24.24 @ 14:43): Allowing for motion artifact, unremarkable study.  - s/p Ciprofloxacin 400mg, Flagyl 500mg, LR 1L bolus in the ED  - Blood culture positive for VRE and E coli  bugs.   - Continue ceftriaxone, dapto, and flagyl,  will fu ID   - Lactulose 20g q6 for 2 days  - Monitor BMs  - Palliative care consult appreciated   - **If patient remains lethargic or develops confusion, consider MR Head to r/o metastatic disease. It patient drops in BP, give albumin 100cc +/- IVF***  - 1 L fluid drained fluid studies sent. Does not meet criteria for SBP.   - CK ordered for baseline and trend weekly while on Dapto   - TTE ordered to r/o infective endocarditis per ID recs --> negative for IE. ID recommends KELVIN due to concern, pending cardio consult   - last few blood cxs -ve  -- Continue ceftriaxone and flagyl   - Continue Dapto (renally dosed)   - CK weekly while on Dapto   - TTE negative   - Cardio consulted for KELVIN - appreciate cardiology recs -- presence of varices, risks potentially outweigh benefits   -Pt BP on lower side, started IVF yesterday, added midodrine 10mg q8h today.   - Remove port if not needed by oncologist : prefer to salvage port  -- Per id, as unable to rule out endocarditis and with presence of port, would plan:  ·	- continue daptomycin 500 mg q 48 hours for 6 weeks from culture clearance (11/27-1/7)  ·	-- check CK weekly (last checked 12/2)  ·	- continue ceftriaxone 2g daily -- > can switch to Levofloxacin 500 mg q 48 hours PO (end date 12/8)  ·	- continue flagyl 500 mg q 12 hours PO (end date 12/8)         #VIPUL on CKD stage 3, likely pre-renal  #anuria  - Creatinine on admission 1.8  - Creatinine 1.2 in June  - s/p 1L LR bolus in ED  - - U lytes c/w prerenal hypovolemia component   - on IVF (d5) with bicarb, f/u serum bicarbonate level  -U/A: Na<20, urine granular casts> likely pre-renal (hepato-renal) with ATN etiology  -given another dose of albumin on 12/2   -midodrine started on 12/2  -rojas placed for suspicion of retention & no urine outpt by straight cath: no urine draining  -f/u nephro recs    #thrombocytopenia, resolved  HIT panel -ve      #DM   - On home Farxiga 10mg daily  - ISS for now, target -180  - Monitor FS and adjust accordingly    #Cataract  - Continue home eye drop      #DVT ppx: Heparin  #GI ppx: Pantoprazole  #Diet: CC  #Activity: IAT  #Code: FULL code, confirmed with daughter  #Dispo: Medicine

## 2024-12-03 NOTE — CHART NOTE - NSCHARTNOTEFT_GEN_A_CORE
Discussed with primary team. Patient/family wants ongoing medical management with no limitations to care. No additional palliative care needs at this time.     Palliative care will sign off. Reconsult as needed.     Please call [phone] with questions or concerns 24/7.   _____________  Koko Hernandez MD  Palliative Medicine  [facility]   [facility]  [phone]

## 2024-12-03 NOTE — CHART NOTE - NSCHARTNOTEFT_GEN_A_CORE
Patient complaining of burning with urination x2.   Vitals within normal limits  Ordered UA and Ucx  Patient only made ~5cc urine  Ordered bladder scan.   Patient retaining 578cc.  Patient has abdominal distention on exam and some suprapubic discomfort.  Ordered bladder scans q6 and straight cath >300.  UA and Ucx sent. Patient complaining of burning with urination x2.   Vitals within normal limits  Ordered UA and Ucx  Patient only made ~5cc urine  Ordered bladder scan.   Patient retaining 578cc.  Patient has abdominal distention on exam and some suprapubic discomfort.  Ordered bladder scans q6 and straight cath >300.  UA sent  Per nurse, they were only able to get ~7cc urine out as patient had a blockage. Patient complaining of burning with urination x2.   Vitals within normal limits  Ordered UA and Ucx  Patient only made ~5cc urine  Ordered bladder scan.   Patient retaining 578cc.  Patient has abdominal distention on exam and some suprapubic discomfort.  Ordered bladder scans q6 and straight cath >300.  UA sent  Per nurse, they were only able to get ~7cc urine out as patient had a blockage.  Repeat blader scan 614  Ordered rojas to be placed Patient complaining of burning with urination x2.   Vitals within normal limits  Ordered UA and Ucx  Patient only made ~5cc urine  Ordered bladder scan.   Patient retaining 578cc.  Patient has abdominal distention on exam and some suprapubic discomfort.  Ordered bladder scans q6 and straight cath >300.  UA sent  Per nurse, they were only able to get ~7cc urine out as patient had a blockage.  Repeat blader scan 614  Ordered rojas to be placed, strict I&O, daily weight Patient complaining of burning with urination x2.   Vitals within normal limits  Ordered UA and Ucx  Patient only made ~5cc urine  Ordered bladder scan.   Patient retaining 578cc.  Patient has abdominal distention on exam and some suprapubic discomfort.  Ordered bladder scans q6 and straight cath >300.  UA sent  Per nurse, they were only able to get ~7cc urine out as patient had a blockage.  Repeat bladder scan 614  Ordered rojas to be placed, strict I&O, daily weight  Nurse unable to place rojas 2/2 blockage.   Called urology and they said they will come place the rojas  Ordered RBUS.    F/u UA, RBUS, Ucx

## 2024-12-03 NOTE — PROGRESS NOTE ADULT - ASSESSMENT
#Decompensated liver cirrhosis with ascites, varices   #Metastatic Cholangiocarcinoma , hx of cholangitis , s/p biliary/hepatic duct drains and [place]olecystoduodenostomy  [place] [facility] [place]  #E Coli and STERLING Faecium Bacteremia on 11/24 Likely GI Translocation  #Large Ascites with No Evidence of SBP; Reported Hx of SBP; s/p Peritoneal Drain at [facility]  #Metabolic Encephalopathy Likely in Setting of Bacteremia; Less Likely Hepatic Encephalopathy  #VIPUL- r/o HRS  - No Evidence of Acute Cholangitis; Reported Hx of Cholangitis;   - Chronic Elevation of ALP  - Perigastric, Paraesophageal, perisplenic and right abdominal wall varices on imaging  * Diagnosed with metastatic cholangiocarcinoma last year. s/p peritoneal drain; biliary/hepatic duct drain; and axios from GB to duodenum at [facility]  * US Abdomen Upper Quadrant Right (06.18.24 @ 02:49) Liver: Partially imaged. Partially imaged stents. No hepatic collection on provided images.Bile ducts: Common bile duct visualized. Intrahepatic biliary ducts are without significant dilatation.  * CT Abdomen and Pelvis w/ IV Cont (11.24.24 @ 14:43) >large abdominal ascites occupying the entire peritoneum. Questionable reactive changes of the small bowel due to ascites. No bowel obstruction.Drain from the level of the gallbladder to the duodenum with associated metal artifact obscuring evaluation. Biliary duct stents are also noted. questionable scrotal wall edema. Correlate with physical exam. Mildly nodular liver may be seen with cirrhosis and associated varices as described.  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 Recommend oncology/palliative evaluation if family is agreeable (currently refusing)  - ID evaluation appreciated. Continue IV Ab per ID (on Rocephin, Daptomycin, and Flagyl). Trend CK. TTE noted with thickened leafklets. Follow up repeat blood cultures. No indication to remove peritoneal drain if peritoneal fluid cx with NGTD monitor for fever; monitor MAP and keep > 65mmHg; follow up blood cultures   - For encephalopathy: Monitor BM and avoid constipation. Continue lactulose 20mg Q6h and miralax 17g QD; titrate 2-3 BMs  - Continue Protonix 40mg QD for GI Prophylaxis; avoid NSAIDs  - Trend CBC and transfuse as needed; keep active type and screen. Check anemia workup (iron studies)  - Follow up with [facility] team or at our [facility] located at [address] [place] [address]. Phone Number: [phone]    - avoid sedatives and opioids   - Please avoid NSAIDs, ACEI/ARB, NSBB      # Lifestyle/Dietary modifications  - Calorie intake 25-40 Kcal/kg/day  - Protein intake: 1.2-1.5 gm/kg/day  - Avoid smoking, alcohol, NSAIDS.  - Abstain from alcohol and all illicit drugs  -Avoid use of herbal and dietary supplements due to potential hepatotoxicity  -Limit use of acetaminophen to <2 grams per day  -Avoid use of nonsteroidal antiinflammatory drugs (NSAIDs)    -Avoid eating any unpasteurized dairy products; avoid eating any raw or undercooked eggs, fish, poultry, or meat; and avoid eating raw/steamed oysters or other shellfish to avoid risk of Vibrio infection.    #Vaccinations:  Please f/u in clinic for being uptodate with HAV/HBV/Influenza/Pneumococcal vaccines.

## 2024-12-03 NOTE — PROGRESS NOTE ADULT - ATTENDING COMMENTS
68-year-old male with a past medical history of DM, cataracts, neuropathy, and metastatic cholangiocarcinoma [place] [facility] presenting for altered mental status. daughter Jazlyn López (physician, [phone]) who reports that patient has had short periods of confusion over the last few months, usually caused by infections (cholangitis, SBP...). This morning, patient woke up and was more lethargic than usual, with delayed responses when spoken to. He has been around sick contacts so they decided to bring him in to r/o infection as cause of lethargy. As per daughter, patient has a peritoneal drain, he drains himself 1L of peritoneal fluid daily. Last MR head to check for mets was done 2 months ago and was negative.       #Metabolic encephalopathy   #VRE and E. coli Bacteremia   #Metastatic cholangiocarcinoma s/p CBD and hepatic duct stenting s/p cholecystoduodenostomy    #Malignant ascites s/p tunneled peritoneal catheter   #Hx of hepatic abscess   - Vitals on arrival: /72, HR 97, T 97, RR 17, SpO2 98% on RA  - On admission WBC 8, lactate 3.9, t bili 1.1, alk phos 258, ammonia 71  - UA negative, RVP negative  - CXR (11.24.24 @ 13:25): Low lung volumes without focal consolidation or pneumothorax.  - CTAP w/ IV Cont (11.24.24 @ 14:43): Large abdominal ascites occupying the entire peritoneum. Questionable reactive changes of the small bowel due to ascites. No bowel obstruction. Drain from the level of the gallbladder to the duodenum with associated metal artifact obscuring evaluation. Biliary duct stents are also noted. Questionable scrotal wall edema. Correlate with physical exam. Mildly nodular liver may be seen with cirrhosis and associated varices as described.  - CT Head No Cont (11.24.24 @ 14:43): Allowing for motion artifact, unremarkable study.  - Blood culture positive for VRE and E coli    - Ascitic studies not consistent with SBP   - Continue Levaquin and flagyl   - Continue Dapto (renally dosed)   - CK weekly while on Dapto   - TTE negative   - Cardio consulted for KELVIN - appreciate cardiology recs -- presence of varices, risks potentially outweigh benefits   - Per ID, as unable to rule out endocarditis and with presence of port, would plan daptomycin 500 mg q 48 hours for 6 weeks from culture clearance (11/27-1/7). continue Levofloxacin 500 mg q 48 hours PO (end date 12/8). continue flagyl 500 mg q 12 hours PO (end date 12/8)   - [place] [facility] want to keep chemoport as patient will   - Follow up repeat Bcx until negative     #Oliguric VIPUL on CKD3 (baseline 1.7)   #Metabolic acidosis   - urine sodium low, possible HRS   - RBUS negative   - c/w IVF   - c/w sodium bicarbonate     #Thrombocytopenia   - resolved     #DM   - On home Farxiga 10mg daily  - ISS for now, target -180  - Monitor FS and adjust accordingly      #DVT ppx: Heparin   #GI ppx: Pantoprazole      Pending: VIPUL improvement   Plan of care d/w wife and daughter over the phone   Dispo: Home

## 2024-12-03 NOTE — PROGRESS NOTE ADULT - SUBJECTIVE AND OBJECTIVE BOX
Gastroenterology progress note:     Patient is a 69y old  Male who presents with a chief complaint of Altered mental status (03 Dec 2024 10:33)       Admitted on: 11-24-24    We are following the patient for: cirrhosis       Interval History: VIPUL      PAST MEDICAL & SURGICAL HISTORY:  History of medical problems  dm type 2, bile duct cancer on chemo, cataracts, gerd, neuropathy (crawling sensation)      H/O inguinal hernia repair  right groin          MEDICATIONS  (STANDING):  chlorhexidine 2% Cloths 1 Application(s) Topical <User Schedule>  DAPTOmycin IVPB 500 milliGRAM(s) IV Intermittent every 48 hours  dextrose 50% Injectable 25 Gram(s) IV Push once  dextrose 50% Injectable 12.5 Gram(s) IV Push once  dextrose 50% Injectable 25 Gram(s) IV Push once  dorzolamide 2% Ophthalmic Solution 1 Drop(s) Right EYE every 8 hours  glucagon  Injectable 1 milliGRAM(s) IntraMuscular once  heparin   Injectable 5000 Unit(s) SubCutaneous every 12 hours  influenza  Vaccine (HIGH DOSE) 0.5 milliLiter(s) IntraMuscular once  insulin lispro (ADMELOG) corrective regimen sliding scale   SubCutaneous three times a day before meals  lactated ringers. 1000 milliLiter(s) (75 mL/Hr) IV Continuous <Continuous>  lactulose Syrup 20 Gram(s) Oral daily  levoFLOXacin  Tablet 500 milliGRAM(s) Oral every 48 hours  metroNIDAZOLE    Tablet 500 milliGRAM(s) Oral every 12 hours  midodrine 10 milliGRAM(s) Oral every 8 hours  pantoprazole    Tablet 40 milliGRAM(s) Oral before breakfast  polyethylene glycol 3350 17 Gram(s) Oral daily  prednisoLONE acetate 1% Suspension 1 Drop(s) Right EYE two times a day  sodium bicarbonate 650 milliGRAM(s) Oral every 8 hours  timolol 0.25% Solution 1 Drop(s) Right EYE two times a day    MEDICATIONS  (PRN):  dextrose Oral Gel 15 Gram(s) Oral once PRN Blood Glucose LESS THAN 70 milliGRAM(s)/deciliter  melatonin 3 milliGRAM(s) Oral at bedtime PRN Insomnia  oxyCODONE    IR 5 milliGRAM(s) Oral every 6 hours PRN Moderate Pain (4 - 6)      Allergies  No Known Allergies      Review of Systems:   Cardiovascular:  No Chest Pain, No Palpitations  Respiratory:  No Cough, No Dyspnea  Gastrointestinal:  As described in HPI  Skin:  No Skin Lesions, No Jaundice  Neuro:  No Syncope, No Dizziness    Physical Examination:  T(C): 36.4 (12-03-24 @ 14:31), Max: 37.2 (12-02-24 @ 21:00)  HR: 79 (12-03-24 @ 14:31) (75 - 87)  BP: 114/62 (12-03-24 @ 14:31) (95/51 - 114/62)  RR: 18 (12-03-24 @ 14:31) (18 - 18)  SpO2: 100% (12-03-24 @ 14:31) (100% - 100%)      12-02-24 @ 07:01  -  12-03-24 @ 07:00  --------------------------------------------------------  IN: 0 mL / OUT: 700 mL / NET: -700 mL    12-03-24 @ 07:01  -  12-03-24 @ 15:01  --------------------------------------------------------  IN: 0 mL / OUT: 1200 mL / NET: -1200 mL      GENERAL: AAOx2-3  HEAD:  Atraumatic, Normocephalic  EYES: conjunctiva and sclera clear  NECK: Supple, no JVD or thyromegaly  CHEST/LUNG: Clear to auscultation bilaterally; No wheeze, rhonchi, or rales  HEART: Regular rate and rhythm; normal S1, S2, No murmurs.  ABDOMEN: Soft, nontender, nondistended; Bowel sounds present  NEUROLOGY: Mild asterixis   SKIN: Intact, no jaundice     Data:                        9.1    11.84 )-----------( 262      ( 03 Dec 2024 09:34 )             27.0     Hgb trend:  9.1  12-03-24 @ 09:34  8.5  12-02-24 @ 07:06  7.9  12-01-24 @ 09:38        12-03    129[L]  |  96[L]  |  50[H]  ----------------------------<  175[H]  4.7   |  24  |  3.0[H]    Ca    7.7[L]      03 Dec 2024 09:34    TPro  6.8  /  Alb  2.3[L]  /  TBili  0.7  /  DBili  x   /  AST  29  /  ALT  12  /  AlkPhos  180[H]  12-03    Liver panel trend:  TBili 0.7   /   AST 29   /   ALT 12   /   AlkP 180   /   Tptn 6.8   /   Alb 2.3    /   DBili --      12-03  TBili 0.6   /   AST 28   /   ALT 14   /   AlkP 190   /   Tptn 7.2   /   Alb 2.3    /   DBili --      12-02  TBili 0.4   /   AST 33   /   ALT 13   /   AlkP 201   /   Tptn 6.5   /   Alb 1.8    /   DBili --      12-01  TBili 0.4   /   AST 34   /   ALT 16   /   AlkP 223   /   Tptn 7.1   /   Alb 1.9    /   DBili --      11-30  TBili 0.5   /   AST 47   /   ALT 16   /   AlkP 243   /   Tptn 7.2   /   Alb 2.0    /   DBili --      11-29  TBili 0.6   /   AST 40   /   ALT 19   /   AlkP 275   /   Tptn 7.7   /   Alb 2.2    /   DBili --      11-28  TBili 0.6   /   AST 32   /   ALT 15   /   AlkP 234   /   Tptn 6.7   /   Alb 1.9    /   DBili --      11-27  TBili 0.7   /   AST 33   /   ALT 15   /   AlkP 250   /   Tptn 7.2   /   Alb 2.0    /   DBili --      11-25  TBili 1.1   /   AST 37   /   ALT 16   /   AlkP 258   /   Tptn 7.7   /   Alb 2.1    /   DBili --      11-24          Culture - Blood (collected 01 Dec 2024 16:00)  Source: .Blood BLOOD  Preliminary Report (03 Dec 2024 01:02):    No growth at 24 hours         Radiology:

## 2024-12-03 NOTE — PROGRESS NOTE ADULT - ATTENDING COMMENTS
68 y o M of [place] ethnicity with T2DM metastatic cholangiocarcinoma admitted with change in mental statusseen for cirrhosis with ascites and varices on imaging.    Patient [place] [facility] [place]x of cholangitis s/p biliary stents. Reported hx of SBP. Admissions with confusion. Patient reprots not awarw of prognosis.    No icterus  Abdomen mild distension non tender  Asterixis +    Cirrhosis  portal HTN decompensation with ascites  CT shows irregular liver contour SAAG 1.5  VIPUL - likely HRS-VIPUL  Hx of SBP  Metastatic cholangio ca  Hx of cholangitis with biliary stents and choledochoduodenostomy      IV albumin with octreotide and midodrine  Palliative input and discussion of goals of care.  Records from [facility] . 68 y o M of [place] ethnicity with T2DM metastatic cholangiocarcinoma admitted with change in mental statusseen for cirrhosis with ascites and varices on imaging.    Patient reports follows at [facility] [place] [place]x of cholangitis s/p biliary stents. Reported hx of SBP. Admissions with confusion. Patient reprots not awarw of prognosis.    No icterus  Abdomen mild distension non tender  Asterixis +    Cirrhosis  portal HTN decompensation with ascites HE  CT shows irregular liver contour SAAG 1.5  VIPUL - likely HRS-VIPUL  Hx of SBP  Metastatic cholangio ca  Hx of cholangitis with biliary stents and choledochoduodenostomy      IV albumin with octreotide and midodrine  Continue lactulose for 3 BM per day  Palliative input and discussion of goals of care.  Records from [facility] [place] [place] .

## 2024-12-03 NOTE — PROCEDURE NOTE - NSPROCNAME_GEN_A_CORE
Left VM with Pain line contact information   
Pt states she needs a letter for work and she would like to discuss details of the letter with a nurse.   
Spoke with patient says that she is requesting a letter indicating that she is under the care of Dr. Rodriguez and that she underwent surgery in 1998, 2001, and 2007. Patient states that letter is not for disability or requesting time off. She would like letter faxed to number below. All questions answered.    Fax- [phone]       
Urinary Device Placement

## 2024-12-03 NOTE — PROCEDURE NOTE - ADDITIONAL PROCEDURE DETAILS
Called for catheter. Pt voiding small amounts and multiple straight cath attempts with 5-7cc output. Pt with bladder scans 500-600cc in the setting of ascites. Catheter placed with no return of urine. Catheter irrigated easily without resistance. Catheter left in place for urine output monitoring.

## 2024-12-03 NOTE — PROGRESS NOTE ADULT - SUBJECTIVE AND OBJECTIVE BOX
SUBJECTIVE / OVERNIGHT EVENTS  Patient slept well overnight. No acute complaints this AM. overnight, pt had retention overnight & crystal was placd by urology: no urine output    MEDICATIONS  chlorhexidine 2% Cloths 1 Application(s) Topical <User Schedule>  DAPTOmycin IVPB 500 milliGRAM(s) IV Intermittent every 48 hours  dextrose 50% Injectable 25 Gram(s) IV Push once  dextrose 50% Injectable 12.5 Gram(s) IV Push once  dextrose 50% Injectable 25 Gram(s) IV Push once  dorzolamide 2% Ophthalmic Solution 1 Drop(s) Right EYE every 8 hours  glucagon  Injectable 1 milliGRAM(s) IntraMuscular once  heparin   Injectable 5000 Unit(s) SubCutaneous every 12 hours  influenza  Vaccine (HIGH DOSE) 0.5 milliLiter(s) IntraMuscular once  insulin lispro (ADMELOG) corrective regimen sliding scale   SubCutaneous three times a day before meals  lactated ringers. 1000 milliLiter(s) IV Continuous <Continuous>  lactulose Syrup 20 Gram(s) Oral daily  levoFLOXacin  Tablet 500 milliGRAM(s) Oral every 48 hours  metroNIDAZOLE    Tablet 500 milliGRAM(s) Oral every 12 hours  midodrine 10 milliGRAM(s) Oral every 8 hours  pantoprazole    Tablet 40 milliGRAM(s) Oral before breakfast  polyethylene glycol 3350 17 Gram(s) Oral daily  prednisoLONE acetate 1% Suspension 1 Drop(s) Right EYE two times a day  sodium bicarbonate 1300 milliGRAM(s) Oral every 8 hours  timolol 0.25% Solution 1 Drop(s) Right EYE two times a day    dextrose Oral Gel 15 Gram(s) Oral once PRN Blood Glucose LESS THAN 70 milliGRAM(s)/deciliter  melatonin 3 milliGRAM(s) Oral at bedtime PRN Insomnia  oxyCODONE    IR 5 milliGRAM(s) Oral every 6 hours PRN Moderate Pain (4 - 6)    VITALS /  EXAM    T(C): 37 (12-03-24 @ 05:17), Max: 37.2 (12-02-24 @ 21:00)  HR: 75 (12-03-24 @ 05:17) (75 - 87)  BP: 111/63 (12-03-24 @ 05:17) (95/51 - 113/62)  RR: 18 (12-03-24 @ 05:17) (18 - 18)  SpO2: 100% (12-03-24 @ 05:17) (100% - 100%)  POCT Blood Glucose.: 168 mg/dL (12-03-24 @ 08:54)  POCT Blood Glucose.: 245 mg/dL (12-02-24 @ 21:10)  POCT Blood Glucose.: 230 mg/dL (12-02-24 @ 16:59)  POCT Blood Glucose.: 173 mg/dL (12-02-24 @ 11:16)    GENERAL: NAD,   CHEST/LUNG: Clear to auscultation bilaterally; No wheezes, rales or rhonchi  HEART: Regular rate and rhythm; No murmurs, rubs, or gallops  ABDOMEN: Soft, Nontender, distended  EXTREMITIES:  No clubbing, cyanosis, or edema    I's & O's     12-02-24 @ 07:01  -  12-03-24 @ 07:00  --------------------------------------------------------  IN:  Total IN: 0 mL    OUT:    Drain (mL): 600 mL    Voided (mL): 100 mL  Total OUT: 700 mL    Total NET: -700 mL        LABS             9.1    11.84 )-----------( 262      ( 12-03-24 @ 09:34 )             27.0     129  |  96  |  50  -------------------------<  175   12-03-24 @ 09:34  4.7  |  24  |  3.0    Ca      7.7     12-03-24 @ 09:34    TPro  6.8  /  Alb  2.3  /  TBili  0.7  /  DBili  x   /  AST  29  /  ALT  12  /  AlkPhos  180  /  GGT  x     12-03-24 @ 09:34                    Urinalysis Basic - ( 03 Dec 2024 09:34 )    Color: x / Appearance: x / SG: x / pH: x  Gluc: 175 mg/dL / Ketone: x  / Bili: x / Urobili: x   Blood: x / Protein: x / Nitrite: x   Leuk Esterase: x / RBC: x / WBC x   Sq Epi: x / Non Sq Epi: x / Bacteria: x      MICRO / IMAGING / CARDIOLOGY  Telemetry: Reviewed   EKG: Reviewed    CULTURES    Culture - Blood (collected 12-01-24 @ 16:00)  Source: .Blood BLOOD  Preliminary Report:    No growth at 24 hours    Culture - Blood (collected 11-29-24 @ 11:36)  Source: .Blood BLOOD  Preliminary Report:    No growth at 72 Hours    Culture - Blood (collected 11-28-24 @ 14:01)  Source: .Blood BLOOD  Preliminary Report:    No growth at 4 days      IMAGING  PACS Image:  (12-01-24 @ 16:32)    CARDIOLOGY

## 2024-12-03 NOTE — PROGRESS NOTE ADULT - SUBJECTIVE AND OBJECTIVE BOX
seen and examined  24 h events noted   no distress         PAST HISTORY  --------------------------------------------------------------------------------  No significant changes to PMH, PSH, FHx, SHx, unless otherwise noted    ALLERGIES & MEDICATIONS  --------------------------------------------------------------------------------  Allergies    No Known Allergies    Intolerances      Standing Inpatient Medications  cefTRIAXone   IVPB 2000 milliGRAM(s) IV Intermittent every 24 hours  chlorhexidine 2% Cloths 1 Application(s) Topical <User Schedule>  DAPTOmycin IVPB 500 milliGRAM(s) IV Intermittent every 48 hours  dextrose 50% Injectable 25 Gram(s) IV Push once  dextrose 50% Injectable 12.5 Gram(s) IV Push once  dextrose 50% Injectable 25 Gram(s) IV Push once  dorzolamide 2% Ophthalmic Solution 1 Drop(s) Right EYE every 8 hours  glucagon  Injectable 1 milliGRAM(s) IntraMuscular once  heparin   Injectable 5000 Unit(s) SubCutaneous every 12 hours  influenza  Vaccine (HIGH DOSE) 0.5 milliLiter(s) IntraMuscular once  insulin lispro (ADMELOG) corrective regimen sliding scale   SubCutaneous three times a day before meals  lactated ringers. 1000 milliLiter(s) IV Continuous <Continuous>  lactulose Syrup 20 Gram(s) Oral daily  metroNIDAZOLE    Tablet 500 milliGRAM(s) Oral every 12 hours  midodrine 10 milliGRAM(s) Oral every 8 hours  pantoprazole    Tablet 40 milliGRAM(s) Oral before breakfast  polyethylene glycol 3350 17 Gram(s) Oral daily  prednisoLONE acetate 1% Suspension 1 Drop(s) Right EYE two times a day  sodium bicarbonate 1300 milliGRAM(s) Oral every 8 hours  timolol 0.25% Solution 1 Drop(s) Right EYE two times a day    PRN Inpatient Medications  dextrose Oral Gel 15 Gram(s) Oral once PRN  melatonin 3 milliGRAM(s) Oral at bedtime PRN  oxyCODONE    IR 5 milliGRAM(s) Oral every 6 hours PRN        VITALS/PHYSICAL EXAM  --------------------------------------------------------------------------------  T(C): 37 (12-03-24 @ 05:17), Max: 37.2 (12-02-24 @ 21:00)  HR: 75 (12-03-24 @ 05:17) (75 - 87)  BP: 111/63 (12-03-24 @ 05:17) (95/51 - 113/62)  RR: 18 (12-03-24 @ 05:17) (18 - 18)  SpO2: 100% (12-03-24 @ 05:17) (100% - 100%)  Wt(kg): --        12-02-24 @ 07:01  -  12-03-24 @ 07:00  --------------------------------------------------------  IN: 0 mL / OUT: 700 mL / NET: -700 mL      Physical Exam:  	Gen: NAD  	Pulm: CTA B/L  	CV: S1S2; no rub  	Abd: +distended  	LE: no edema  	    LABS/STUDIES  --------------------------------------------------------------------------------              8.5    12.28 >-----------<  195      [12-02-24 @ 07:06]              25.5     127  |  94  |  43  ----------------------------<  179      [12-02-24 @ 07:06]  4.4   |  24  |  3.0        Ca     7.4     [12-02-24 @ 07:06]      Mg     2.1     [12-01-24 @ 09:38]      Phos  2.8     [12-01-24 @ 09:38]    TPro  7.2  /  Alb  2.3  /  TBili  0.6  /  DBili  x   /  AST  28  /  ALT  14  /  AlkPhos  190  [12-02-24 @ 07:06]    Creatinine Trend:  SCr 3.0 [12-02 @ 07:06]  SCr 2.8 [12-01 @ 09:38]  SCr 2.3 [11-30 @ 08:32]  SCr 1.8 [11-29 @ 11:36]  SCr 1.6 [11-28 @ 14:01]    Urinalysis - [12-03-24 @ 06:15]      Color Dark Yellow / Appearance Cloudy / SG 1.022 / pH 5.5      Gluc Negative / Ketone Trace  / Bili Negative / Urobili 1.0       Blood Large / Protein 100 / Leuk Est Moderate / Nitrite Negative       / WBC 69 / Hyaline  / Gran  / Sq Epi  / Non Sq Epi 2 / Bacteria Occasional    Urine Creatinine 160      [12-02-24 @ 17:02]  Urine Protein 134      [12-02-24 @ 17:02]  Urine Sodium <20.0      [12-02-24 @ 17:02]         seen and examined  24 h events noted   no distress         PAST HISTORY  --------------------------------------------------------------------------------  No significant changes to PMH, PSH, FHx, SHx, unless otherwise noted    ALLERGIES & MEDICATIONS  --------------------------------------------------------------------------------  Allergies    No Known Allergies    Intolerances      Standing Inpatient Medications  cefTRIAXone   IVPB 2000 milliGRAM(s) IV Intermittent every 24 hours  chlorhexidine 2% Cloths 1 Application(s) Topical <User Schedule>  DAPTOmycin IVPB 500 milliGRAM(s) IV Intermittent every 48 hours  dextrose 50% Injectable 25 Gram(s) IV Push once  dextrose 50% Injectable 12.5 Gram(s) IV Push once  dextrose 50% Injectable 25 Gram(s) IV Push once  dorzolamide 2% Ophthalmic Solution 1 Drop(s) Right EYE every 8 hours  glucagon  Injectable 1 milliGRAM(s) IntraMuscular once  heparin   Injectable 5000 Unit(s) SubCutaneous every 12 hours  influenza  Vaccine (HIGH DOSE) 0.5 milliLiter(s) IntraMuscular once  insulin lispro (ADMELOG) corrective regimen sliding scale   SubCutaneous three times a day before meals  lactated ringers. 1000 milliLiter(s) IV Continuous <Continuous>  lactulose Syrup 20 Gram(s) Oral daily  metroNIDAZOLE    Tablet 500 milliGRAM(s) Oral every 12 hours  midodrine 10 milliGRAM(s) Oral every 8 hours  pantoprazole    Tablet 40 milliGRAM(s) Oral before breakfast  polyethylene glycol 3350 17 Gram(s) Oral daily  prednisoLONE acetate 1% Suspension 1 Drop(s) Right EYE two times a day  sodium bicarbonate 1300 milliGRAM(s) Oral every 8 hours  timolol 0.25% Solution 1 Drop(s) Right EYE two times a day    PRN Inpatient Medications  dextrose Oral Gel 15 Gram(s) Oral once PRN  melatonin 3 milliGRAM(s) Oral at bedtime PRN  oxyCODONE    IR 5 milliGRAM(s) Oral every 6 hours PRN        VITALS/PHYSICAL EXAM  --------------------------------------------------------------------------------  T(C): 37 (12-03-24 @ 05:17), Max: 37.2 (12-02-24 @ 21:00)  HR: 75 (12-03-24 @ 05:17) (75 - 87)  BP: 111/63 (12-03-24 @ 05:17) (95/51 - 113/62)  RR: 18 (12-03-24 @ 05:17) (18 - 18)  SpO2: 100% (12-03-24 @ 05:17) (100% - 100%)  Wt(kg): --        12-02-24 @ 07:01  -  12-03-24 @ 07:00  --------------------------------------------------------  IN: 0 mL / OUT: 700 mL / NET: -700 mL      Physical Exam:  	Gen: NAD  	Pulm: CTA B/L  	CV: S1S2; no rub  	Abd: +distended  	LE: no edema  	    LABS/STUDIES  --------------------------------------------------------------------------------              8.5    12.28 >-----------<  195      [12-02-24 @ 07:06]              25.5     127  |  94  |  43  ----------------------------<  179      [12-02-24 @ 07:06]  4.4   |  24  |  3.0        Ca     7.4     [12-02-24 @ 07:06]      Mg     2.1     [12-01-24 @ 09:38]      Phos  2.8     [12-01-24 @ 09:38]    TPro  7.2  /  Alb  2.3  /  TBili  0.6  /  DBili  x   /  AST  28  /  ALT  14  /  AlkPhos  190  [12-02-24 @ 07:06]    Creatinine Trend:  SCr 3.0 [12-02 @ 07:06]  SCr 2.8 [12-01 @ 09:38]  SCr 2.3 [11-30 @ 08:32]  SCr 1.8 [11-29 @ 11:36]  SCr 1.6 [11-28 @ 14:01]    Urinalysis - [12-03-24 @ 06:15]      Color Dark Yellow / Appearance Cloudy / SG 1.022 / pH 5.5      Gluc Negative / Ketone Trace  / Bili Negative / Urobili 1.0       Blood Large / Protein 100 / Leuk Est Moderate / Nitrite Negative       / WBC 69 / Hyaline  / Gran  / Sq Epi  / Non Sq Epi 2 / Bacteria Occasional    Urine Creatinine 160      [12-02-24 @ 17:02]  Urine Protein 134      [12-02-24 @ 17:02]  Urine Sodium <20.0      [12-02-24 @ 17:02]

## 2024-12-04 ENCOUNTER — TRANSCRIPTION ENCOUNTER (OUTPATIENT)
Age: 69
End: 2024-12-04

## 2024-12-04 VITALS
DIASTOLIC BLOOD PRESSURE: 68 MMHG | SYSTOLIC BLOOD PRESSURE: 111 MMHG | TEMPERATURE: 98 F | HEART RATE: 91 BPM | RESPIRATION RATE: 18 BRPM | OXYGEN SATURATION: 100 %

## 2024-12-04 LAB
ALBUMIN SERPL ELPH-MCNC: 2 G/DL — LOW (ref 3.5–5.2)
ALP SERPL-CCNC: 148 U/L — HIGH (ref 30–115)
ALT FLD-CCNC: 12 U/L — SIGNIFICANT CHANGE UP (ref 0–41)
ANION GAP SERPL CALC-SCNC: 10 MMOL/L — SIGNIFICANT CHANGE UP (ref 7–14)
ANION GAP SERPL CALC-SCNC: 14 MMOL/L — SIGNIFICANT CHANGE UP (ref 7–14)
AST SERPL-CCNC: 30 U/L — SIGNIFICANT CHANGE UP (ref 0–41)
BASOPHILS # BLD AUTO: 0.11 K/UL — SIGNIFICANT CHANGE UP (ref 0–0.2)
BASOPHILS NFR BLD AUTO: 0.9 % — SIGNIFICANT CHANGE UP (ref 0–1)
BILIRUB SERPL-MCNC: 1 MG/DL — SIGNIFICANT CHANGE UP (ref 0.2–1.2)
BUN SERPL-MCNC: 48 MG/DL — HIGH (ref 10–20)
BUN SERPL-MCNC: 49 MG/DL — HIGH (ref 10–20)
CALCIUM SERPL-MCNC: 8.1 MG/DL — LOW (ref 8.4–10.4)
CALCIUM SERPL-MCNC: 8.1 MG/DL — LOW (ref 8.4–10.5)
CHLORIDE SERPL-SCNC: 97 MMOL/L — LOW (ref 98–110)
CHLORIDE SERPL-SCNC: 97 MMOL/L — LOW (ref 98–110)
CO2 SERPL-SCNC: 19 MMOL/L — SIGNIFICANT CHANGE UP (ref 17–32)
CO2 SERPL-SCNC: 23 MMOL/L — SIGNIFICANT CHANGE UP (ref 17–32)
CREAT SERPL-MCNC: 3.3 MG/DL — HIGH (ref 0.7–1.5)
CREAT SERPL-MCNC: 3.5 MG/DL — HIGH (ref 0.7–1.5)
CULTURE RESULTS: NO GROWTH — SIGNIFICANT CHANGE UP
CULTURE RESULTS: SIGNIFICANT CHANGE UP
CULTURE RESULTS: SIGNIFICANT CHANGE UP
EGFR: 18 ML/MIN/1.73M2 — LOW
EGFR: 19 ML/MIN/1.73M2 — LOW
EOSINOPHIL # BLD AUTO: 1.4 K/UL — HIGH (ref 0–0.7)
EOSINOPHIL NFR BLD AUTO: 11 % — HIGH (ref 0–8)
GLUCOSE BLDC GLUCOMTR-MCNC: 167 MG/DL — HIGH (ref 70–99)
GLUCOSE BLDC GLUCOMTR-MCNC: 254 MG/DL — HIGH (ref 70–99)
GLUCOSE BLDC GLUCOMTR-MCNC: 287 MG/DL — HIGH (ref 70–99)
GLUCOSE BLDC GLUCOMTR-MCNC: 366 MG/DL — HIGH (ref 70–99)
GLUCOSE SERPL-MCNC: 168 MG/DL — HIGH (ref 70–99)
GLUCOSE SERPL-MCNC: 295 MG/DL — HIGH (ref 70–99)
HCT VFR BLD CALC: 27.8 % — LOW (ref 42–52)
HGB BLD-MCNC: 9.3 G/DL — LOW (ref 14–18)
IMM GRANULOCYTES NFR BLD AUTO: 0.7 % — HIGH (ref 0.1–0.3)
LYMPHOCYTES # BLD AUTO: 19 % — LOW (ref 20.5–51.1)
LYMPHOCYTES # BLD AUTO: 2.42 K/UL — SIGNIFICANT CHANGE UP (ref 1.2–3.4)
MAGNESIUM SERPL-MCNC: 2.1 MG/DL — SIGNIFICANT CHANGE UP (ref 1.8–2.4)
MCHC RBC-ENTMCNC: 29.2 PG — SIGNIFICANT CHANGE UP (ref 27–31)
MCHC RBC-ENTMCNC: 33.5 G/DL — SIGNIFICANT CHANGE UP (ref 32–37)
MCV RBC AUTO: 87.1 FL — SIGNIFICANT CHANGE UP (ref 80–94)
MONOCYTES # BLD AUTO: 1.43 K/UL — HIGH (ref 0.1–0.6)
MONOCYTES NFR BLD AUTO: 11.3 % — HIGH (ref 1.7–9.3)
NEUTROPHILS # BLD AUTO: 7.26 K/UL — HIGH (ref 1.4–6.5)
NEUTROPHILS NFR BLD AUTO: 57.1 % — SIGNIFICANT CHANGE UP (ref 42.2–75.2)
NRBC # BLD: 0 /100 WBCS — SIGNIFICANT CHANGE UP (ref 0–0)
PLATELET # BLD AUTO: 251 K/UL — SIGNIFICANT CHANGE UP (ref 130–400)
PMV BLD: 9.9 FL — SIGNIFICANT CHANGE UP (ref 7.4–10.4)
POTASSIUM SERPL-MCNC: 5 MMOL/L — SIGNIFICANT CHANGE UP (ref 3.5–5)
POTASSIUM SERPL-MCNC: 5.4 MMOL/L — HIGH (ref 3.5–5)
POTASSIUM SERPL-SCNC: 5 MMOL/L — SIGNIFICANT CHANGE UP (ref 3.5–5)
POTASSIUM SERPL-SCNC: 5.4 MMOL/L — HIGH (ref 3.5–5)
PROT SERPL-MCNC: 6.5 G/DL — SIGNIFICANT CHANGE UP (ref 6–8)
RBC # BLD: 3.19 M/UL — LOW (ref 4.7–6.1)
RBC # FLD: 17.9 % — HIGH (ref 11.5–14.5)
SODIUM SERPL-SCNC: 130 MMOL/L — LOW (ref 135–146)
SODIUM SERPL-SCNC: 130 MMOL/L — LOW (ref 135–146)
SPECIMEN SOURCE: SIGNIFICANT CHANGE UP
WBC # BLD: 12.71 K/UL — HIGH (ref 4.8–10.8)
WBC # FLD AUTO: 12.71 K/UL — HIGH (ref 4.8–10.8)

## 2024-12-04 PROCEDURE — 99232 SBSQ HOSP IP/OBS MODERATE 35: CPT

## 2024-12-04 PROCEDURE — 99239 HOSP IP/OBS DSCHRG MGMT >30: CPT

## 2024-12-04 RX ORDER — DAPTOMYCIN 500 MG/10ML
500 INJECTION, POWDER, LYOPHILIZED, FOR SOLUTION INTRAVENOUS
Qty: 0 | Refills: 0 | DISCHARGE
Start: 2024-12-04

## 2024-12-04 RX ORDER — OXYCODONE HYDROCHLORIDE 30 MG/1
1 TABLET ORAL
Qty: 0 | Refills: 0 | DISCHARGE
Start: 2024-12-04

## 2024-12-04 RX ORDER — SODIUM ZIRCONIUM CYCLOSILICATE 5 G/5G
10 POWDER, FOR SUSPENSION ORAL EVERY 12 HOURS
Refills: 0 | Status: DISCONTINUED | OUTPATIENT
Start: 2024-12-04 | End: 2024-12-04

## 2024-12-04 RX ORDER — METRONIDAZOLE 500 MG/1
1 TABLET ORAL
Qty: 0 | Refills: 0 | DISCHARGE
Start: 2024-12-04

## 2024-12-04 RX ORDER — LACTULOSE 10 G/15ML
30 SOLUTION ORAL
Qty: 0 | Refills: 0 | DISCHARGE
Start: 2024-12-04

## 2024-12-04 RX ORDER — SODIUM ZIRCONIUM CYCLOSILICATE 5 G/5G
10 POWDER, FOR SUSPENSION ORAL ONCE
Refills: 0 | Status: DISCONTINUED | OUTPATIENT
Start: 2024-12-04 | End: 2024-12-04

## 2024-12-04 RX ORDER — CYCLOBENZAPRINE HCL 10 MG
1 TABLET ORAL
Qty: 0 | Refills: 0 | DISCHARGE
Start: 2024-12-04

## 2024-12-04 RX ORDER — OCTREOTIDE ACETATE 500 UG/ML
200 INJECTION, SOLUTION INTRAVENOUS; SUBCUTANEOUS EVERY 8 HOURS
Refills: 0 | Status: DISCONTINUED | OUTPATIENT
Start: 2024-12-04 | End: 2024-12-04

## 2024-12-04 RX ORDER — LEVOFLOXACIN 250 MG/1
1 TABLET, FILM COATED ORAL
Qty: 0 | Refills: 0 | DISCHARGE
Start: 2024-12-04

## 2024-12-04 RX ORDER — POLYETHYLENE GLYCOL 3350 17 G/17G
17 POWDER, FOR SOLUTION ORAL
Refills: 0 | DISCHARGE

## 2024-12-04 RX ORDER — CYCLOBENZAPRINE HCL 10 MG
5 TABLET ORAL THREE TIMES A DAY
Refills: 0 | Status: DISCONTINUED | OUTPATIENT
Start: 2024-12-04 | End: 2024-12-04

## 2024-12-04 RX ORDER — OCTREOTIDE ACETATE 500 UG/ML
0.08 INJECTION, SOLUTION INTRAVENOUS; SUBCUTANEOUS
Qty: 0 | Refills: 0 | DISCHARGE
Start: 2024-12-04

## 2024-12-04 RX ORDER — ACETAMINOPHEN, DIPHENHYDRAMINE HCL, PHENYLEPHRINE HCL 325; 25; 5 MG/1; MG/1; MG/1
1 TABLET ORAL
Qty: 0 | Refills: 0 | DISCHARGE
Start: 2024-12-04

## 2024-12-04 RX ORDER — MIDODRINE HYDROCHLORIDE 5 MG/1
1 TABLET ORAL
Qty: 0 | Refills: 0 | DISCHARGE
Start: 2024-12-04

## 2024-12-04 RX ORDER — SODIUM BICARBONATE 84 MG/ML
1 INJECTION, SOLUTION INTRAVENOUS
Qty: 0 | Refills: 0 | DISCHARGE
Start: 2024-12-04

## 2024-12-04 RX ADMIN — Medication 1 DROP(S): at 05:55

## 2024-12-04 RX ADMIN — SODIUM BICARBONATE 650 MILLIGRAM(S): 84 INJECTION, SOLUTION INTRAVENOUS at 05:58

## 2024-12-04 RX ADMIN — MIDODRINE HYDROCHLORIDE 10 MILLIGRAM(S): 5 TABLET ORAL at 05:58

## 2024-12-04 RX ADMIN — OCTREOTIDE ACETATE 200 MICROGRAM(S): 500 INJECTION, SOLUTION INTRAVENOUS; SUBCUTANEOUS at 13:47

## 2024-12-04 RX ADMIN — LACTULOSE 20 GRAM(S): 10 SOLUTION ORAL at 12:45

## 2024-12-04 RX ADMIN — METRONIDAZOLE 500 MILLIGRAM(S): 500 TABLET ORAL at 05:59

## 2024-12-04 RX ADMIN — PREDNISOLONE ACETATE 1 DROP(S): 1.2 SUSPENSION/ DROPS OPHTHALMIC at 05:54

## 2024-12-04 RX ADMIN — DORZOLAMIDE HYDROCHLORIDE 1 DROP(S): 20 SOLUTION OPHTHALMIC at 05:54

## 2024-12-04 RX ADMIN — Medication 50 MILLILITER(S): at 18:17

## 2024-12-04 RX ADMIN — PANTOPRAZOLE SODIUM 40 MILLIGRAM(S): 40 TABLET, DELAYED RELEASE ORAL at 05:58

## 2024-12-04 RX ADMIN — Medication 5 MILLIGRAM(S): at 13:50

## 2024-12-04 RX ADMIN — Medication 5000 UNIT(S): at 18:17

## 2024-12-04 RX ADMIN — Medication 5: at 12:42

## 2024-12-04 RX ADMIN — Medication 1: at 08:28

## 2024-12-04 RX ADMIN — CHLORHEXIDINE GLUCONATE 1 APPLICATION(S): 1.2 RINSE ORAL at 05:58

## 2024-12-04 RX ADMIN — PREDNISOLONE ACETATE 1 DROP(S): 1.2 SUSPENSION/ DROPS OPHTHALMIC at 18:14

## 2024-12-04 RX ADMIN — SODIUM BICARBONATE 650 MILLIGRAM(S): 84 INJECTION, SOLUTION INTRAVENOUS at 13:51

## 2024-12-04 RX ADMIN — Medication 3: at 18:16

## 2024-12-04 RX ADMIN — POLYETHYLENE GLYCOL 3350 17 GRAM(S): 17 POWDER, FOR SOLUTION ORAL at 12:45

## 2024-12-04 RX ADMIN — SODIUM ZIRCONIUM CYCLOSILICATE 10 GRAM(S): 5 POWDER, FOR SUSPENSION ORAL at 18:15

## 2024-12-04 RX ADMIN — Medication 5000 UNIT(S): at 05:58

## 2024-12-04 RX ADMIN — Medication 50 MILLILITER(S): at 12:43

## 2024-12-04 RX ADMIN — Medication 1 DROP(S): at 18:14

## 2024-12-04 RX ADMIN — METRONIDAZOLE 500 MILLIGRAM(S): 500 TABLET ORAL at 18:16

## 2024-12-04 RX ADMIN — MIDODRINE HYDROCHLORIDE 10 MILLIGRAM(S): 5 TABLET ORAL at 13:47

## 2024-12-04 RX ADMIN — DORZOLAMIDE HYDROCHLORIDE 1 DROP(S): 20 SOLUTION OPHTHALMIC at 13:50

## 2024-12-04 NOTE — DISCHARGE NOTE PROVIDER - NSDCFUSCHEDAPPT_GEN_ALL_CORE_FT
Miguel A Vasquez  [facility]  [facility]M [id number]  Scheduled Appointment: 12/16/2024

## 2024-12-04 NOTE — PROGRESS NOTE ADULT - PROVIDER SPECIALTY LIST ADULT
Gastroenterology
Hepatology
Infectious Disease
Infectious Disease
Internal Medicine
Nephrology
Internal Medicine
Hepatology
Internal Medicine
Internal Medicine
Nephrology
Nephrology
Gastroenterology
Internal Medicine
Infectious Disease

## 2024-12-04 NOTE — DISCHARGE NOTE PROVIDER - NSDCCPCAREPLAN_GEN_ALL_CORE_FT
PRINCIPAL DISCHARGE DIAGNOSIS  Diagnosis: Confusion  Assessment and Plan of Treatment:       SECONDARY DISCHARGE DIAGNOSES  Diagnosis: Ascites  Assessment and Plan of Treatment:

## 2024-12-04 NOTE — PROGRESS NOTE ADULT - ASSESSMENT
Patient is a 68-year-old male with a past medical history of DM, cataracts, neuropathy, and metastatic cholangiocarcinoma (follows at [facility]) presenting for altered mental status  # VIPUL prerenal vs ATN   # hyponatremia  # metastatic cholangioKc  # anemia chronic   # VRE and E fecalis bacteremia   - cr trending up   - oligoanuric / can not urinate / s/p retention   -U sodium < 20 in favor of prerenal versus HRS s/p albumin   - continue  LR at 75  cc per hour  -  last ph at goal/ no binders   - sodium level trending up   - on dapto follow ck levels / last noted   - continue sodium bicarb po  -  k noted  start lokelma 10 q 12   -  BP on the low side, continue midodrine   - no need for RRT today / spoke to patient daughter and wife will start HD in am if no improvement  will follow

## 2024-12-04 NOTE — DISCHARGE NOTE PROVIDER - HOSPITAL COURSE
Patient is a 68-year-old male with a past medical history of DM, cataracts, neuropathy, and metastatic cholangiocarcinoma (follows at [facility]) presenting for altered mental status. On my exam, patient is AAOx3, he has some abdominal discomfort with mild pain around the drain he has in RUQ.    Called daughter Jazlyn López (physician, [phone]) who reports that patient has had short periods of confusion over the last few months, usually caused by infections (cholangitis, SBP...). This morning, patient woke up and was more lethargic than usual, with delayed responses when spoken to. He has been around sick contacts so they decided to bring him in to r/o infection as cause of lethargy. As per daughter, patient has a peritoneal drain, he drains himself 1L of peritoneal fluid daily. Last MR head to check for mets was done 2 months ago and was negative.   No fever/chills, no vomiting, no urinary sx or diarrhea at home.     Vitals: /72, HR 97, T 97, RR 17, SpO2 98% on RA  Labs: WBC 8, Hgb 8, Na 132, Crea 1.8, alk phos 258, lactate 3.9, ammonia 71,   UA negative  RVP negative  Imaging:  - CXR (11.24.24 @ 13:25): Low lung volumes without focal consolidation or pneumothorax.  - CTAP w/ IV Cont (11.24.24 @ 14:43): Large abdominal ascites occupying the entire peritoneum. Questionable reactive changes of the small bowel due to ascites. No bowel obstruction. Drain from the level of the gallbladder to the duodenum with associated metal artifact obscuring evaluation. Biliary duct stents are also noted. Questionable scrotal wall edema. Correlate with physical exam. Mildly nodular liver may be seen with cirrhosis and associated varices as described.  - CT Head No Cont (11.24.24 @ 14:43): Allowing for motion artifact, unremarkable study.    In the ED, patient received Ciprofloxacin 400mg, Flagyl 500mg, LR 1L bolus and was started on lactulose.     #Lethargy   #Metabolic encephalopathy   #VRE and E. coli Bacteremia   #Metastatic cholangiocarcinoma s/p CBD and hepatic duct stenting s/p cholecystoduodenostomy s/p peritoneal drain (currently in place)  #Large abdominal ascites   #Hx of hepatic abscess   * Follows at [facility] in [place] since May 2022 for cholangiocarcinoma  * Follows with Dr Sanchez (Advanced GI) and Dr. Littlejohn (oncologist) at [facility]  * Deemed not a surgical candidate because of extensive metastatic disease  * Pt has a peritoneal drain in place and drains himself 1L of peritoneal fluid daily   * Last MR Head around 2 months ago, no mets  - Vitals on arrival: /72, HR 97, T 97, RR 17, SpO2 98% on RA  - On admission WBC 8, lactate 3.9, t bili 1.1, alk phos 258, ammonia 71  - UA negative, RVP negative  - CXR (11.24.24 @ 13:25): Low lung volumes without focal consolidation or pneumothorax.  - CTAP w/ IV Cont (11.24.24 @ 14:43): Large abdominal ascites occupying the entire peritoneum. Questionable reactive changes of the small bowel due to ascites. No bowel obstruction. Drain from the level of the gallbladder to the duodenum with associated metal artifact obscuring evaluation. Biliary duct stents are also noted. Questionable scrotal wall edema. Correlate with physical exam. Mildly nodular liver may be seen with cirrhosis and associated varices as described.  - CT Head No Cont (11.24.24 @ 14:43): Allowing for motion artifact, unremarkable study.  - s/p Ciprofloxacin 400mg, Flagyl 500mg, LR 1L bolus in the ED  - Blood culture positive for VRE and E coli  bugs.   - Continue ceftriaxone, dapto, and flagyl,  will fu ID   - Lactulose 20g q6 for 2 days  - Monitor BMs  - Palliative care consulted  - 1 L fluid drained fluid studies sent. Does not meet criteria for SBP.   - CK ordered for baseline and trend weekly while on Dapto   - TTE ordered to r/o infective endocarditis per ID recs --> negative for IE. ID recommends KELVIN due to concern  - last few blood cxs -ve  - TTE negative   - Cardio consulted for KELVIN - appreciate cardiology recs -- presence of varices, risks potentially outweigh benefits   -Pt BP on lower side, started IVF yesterday, added midodrine 10mg q8h today.   - Remove port if not needed by oncologist : prefer to salvage port  -- Per id, as unable to rule out endocarditis and with presence of port, would plan:  ·- continue daptomycin 500 mg q 48 hours for 6 weeks from culture clearance (11/27-1/7)  ·-- check CK weekly (last checked 12/2)  ·- continue ceftriaxone 2g daily -- > switched to Levofloxacin 500 mg q 48 hours PO (end date 12/8)  ·- continue flagyl 500 mg q 12 hours PO (end date 12/8)       #VIPUL on CKD stage 3, likely pre-renal  #oligoanuric   #hyperkalemia  - Creatinine on admission 1.8  - Creatinine 1.2 in June  - s/p 1L LR bolus in ED  - - U lytes c/w prerenal hypovolemia component   - cw oral bicarb  -U/A: Na<20, urine granular casts> likely pre-renal (hepato-renal) with ATN etiology  -midodrine started on 12/2, started octreotide 200 tid sc, 2 prior doses albumin challenge given on 12-1 & 12-2, given albumin today as well on 12/4   -rojas placed for suspicion of retention & no urine outpt , was removed yest. total urine outpt 150cc  - nephro recs: possible HD tmrw am if no improvement  started on lokelma 10q12h  -cr uptrending to 3.3    #thrombocytopenia, resolved  HIT panel -ve      Patient to be transferred to [facility] after approval, discussed with Dr Waite. Patient is a 68-year-old male with a past medical history of DM, cataracts, neuropathy, and metastatic cholangiocarcinoma (follows at [facility]) presenting for altered mental status. On my exam, patient is AAOx3, he has some abdominal discomfort with mild pain around the drain he has in RUQ.    Called daughter Jazlyn López (physician, [phone]) who reports that patient has had short periods of confusion over the last few months, usually caused by infections (cholangitis, SBP...). This morning, patient woke up and was more lethargic than usual, with delayed responses when spoken to. He has been around sick contacts so they decided to bring him in to r/o infection as cause of lethargy. As per daughter, patient has a peritoneal drain, he drains himself 1L of peritoneal fluid daily. Last MR head to check for mets was done 2 months ago and was negative.   No fever/chills, no vomiting, no urinary sx or diarrhea at home.     In the ED:  Vitals: /72, HR 97, T 97, RR 17, SpO2 98% on RA  Labs: WBC 8, Hgb 8, Na 132, Crea 1.8, alk phos 258, lactate 3.9, ammonia 71,   UA negative  RVP negative  Imaging:  - CXR (11.24.24 @ 13:25): Low lung volumes without focal consolidation or pneumothorax.  - CTAP w/ IV Cont (11.24.24 @ 14:43): Large abdominal ascites occupying the entire peritoneum. Questionable reactive changes of the small bowel due to ascites. No bowel obstruction. Drain from the level of the gallbladder to the duodenum with associated metal artifact obscuring evaluation. Biliary duct stents are also noted. Questionable scrotal wall edema. Correlate with physical exam. Mildly nodular liver may be seen with cirrhosis and associated varices as described.  - CT Head No Cont (11.24.24 @ 14:43): Allowing for motion artifact, unremarkable study.   patient received Ciprofloxacin 400mg, Flagyl 500mg, LR 1L bolus and was started on lactulose.  Patient was admitted to medical floor.    #Acute Metabolic Encephalopathy in setting of Bacteremia, resolving  #VRE and E. coli Bacteremia likely GI translocation  #Metastatic cholangiocarcinoma s/p CBD and hepatic duct stenting s/p cholecystoduodenostomy s/p peritoneal drain (currently in place)  #Decompensated liver cirrhosis with large ascites, portal HTN varices, SBP ruled out  #Hx of cholangitis with biliary stents and choledochoduodenostomy  - No Evidence of Acute Cholangitis   - Vitals on arrival: /72, HR 97, T 97, RR 17, SpO2 98% on RA  - 12,2; 12/3: mild asterixis, but AAX3. No icterus. Abdomen mild distension non tender abdominal catheter. Minimal urine output  - On admission WBC 8, lactate 3.9, t bili 1.1, alk phos 258, ammonia 71  - Trend CBC and transfuse as needed; keep active type and screen.  - Chronic Elevation of ALP  - UA negative, RVP negative  - Blood culture positive for VRE and E coli  bugs -> Repeat BCx neg x 2  - CXR (11.24.24 @ 13:25): Low lung volumes without focal consolidation or pneumothorax.  - Perigastric, Paraesophageal, perisplenic and right abdominal wall varices on imaging  - CTAP w/ IV Cont (11.24.24 @ 14:43): Large abdominal ascites occupying the entire peritoneum. Questionable reactive changes of the small bowel due to ascites. No bowel obstruction. Drain from the level of the gallbladder to the duodenum with associated metal artifact obscuring evaluation. Biliary duct stents are also noted. Questionable scrotal wall edema. Correlate with physical exam. Mildly nodular liver may be seen with cirrhosis and associated varices as described.  - CT Head No Cont (11.24.24 @ 14:43): Allowing for motion artifact, unremarkable study.  - TTE ordered to r/o infective endocarditis (IE) --> negative for IE.   - S/P drainage of 1L peritoneal fluid via peritoneal drain on 11/25: fluid not suggestive of SBP; Fluid Cx with staph epidermidis, likely contaminant.  SAAG 1.5  - s/p Ciprofloxacin 400mg, Flagyl 500mg, LR 1L bolus in the ED  - continue daptomycin 500 mg q 48 hours for 6 weeks from culture clearance (11/27-1/7) with weekly CK monitoring for empiric treatment for endocarditis  - continue ceftriaxone 2g daily -- > switched to Levofloxacin 500 mg q 48 hours PO (end date 12/8)  - continue flagyl 500 mg q 12 hours PO (end date 12/8)   - s/p Lactulose 20g q6 with improvement of mentation -> now qdaily for 3 BM per day  - Continue Protonix 40mg QD for GI Prophylaxis;  - avoid sedatives and opioids   - Please avoid NSAIDs, ACEI/ARB, NSBB  - Palliative care consulted  - Remove port if not needed by oncologist : prefer to salvage port  - Follows at [facility] in [place] since May 2022 for cholangiocarcinoma  - Follows with Dr Sanchez (Advanced GI) and Dr. Littlejohn (oncologist) at [facility]  - Deemed not a surgical candidate because of extensive metastatic disease  - Pt has a peritoneal drain in place and drains himself 1L of peritoneal fluid daily   - Last MR Head around 2 months ago, no mets  - US Abdomen Upper Quadrant Right (06.18.24 @ 02:49) Liver: Partially imaged. Partially imaged stents. No hepatic collection on provided images.Bile ducts: Common bile duct visualized. Intrahepatic biliary ducts are without significant dilatation  - ID, Hepatology and Palliative on the case    #Suspected Hepatorenal Syndrome (HRS)  #VIPUL on CKD and oligoanuria in setting of Suspected HRS  #hyperkalemia, Hypotension in setting of Third Spacing  - Creatinine on admission 1.8 now above 3.0 (was 3.3 today)  - Creatinine Trend: 3.3<--, 3.0<--, 3.0<--, 2.8<--, 2.3<--, 1.8<--  (baseline 1.2 in June 2024),  s/o IV albumin, on octreotide and midrodrine, anuric   - U/A: Na<20, urine granular casts> likely pre-renal (hepato-renal) with ATN etiology  - U lytes c/w prerenal hypovolemia component   - US Kidney and Bladder (12.03.24 @ 08:00) > No hydronephrosis or calculus. Collapsed bladder with Rojas in place.  - s/p 1L LR bolus in ED  - continue  LR at 75  cc per hour  - cw oral bicarb  - Pt BP on lower side, started IVF yesterday, added midodrine 10mg q8h  12/2  -  started octreotide 200 tid sc, 2 prior doses albumin challenge given on 12-1 & 12-2, given albumin today as well on 12/4   - rojas placed for suspicion of retention & no urine outpt , was removed 12/3. total urine outpt 150cc  - nephro recs: possible HD if no improvement  - started on lokelma 10q12h for hyperkalemia  - Nephrology on the case    #New Onset of Hiccups  - improving with muscle relaxant, flexeril    #thrombocytopenia, resolved  - HIT panel -ve    # Lifestyle/Dietary modifications  - Calorie intake 25-40 Kcal/kg/day  - Protein intake: 1.2-1.5 gm/kg/day  - Avoid smoking, alcohol, NSAIDS.  - Abstain from alcohol and all illicit drugs  -Avoid use of herbal and dietary supplements due to potential hepatotoxicity  -Limit use of acetaminophen to <2 grams per day  -Avoid eating any unpasteurized dairy products; avoid eating any raw or undercooked eggs, fish, poultry, or meat; and avoid eating raw/steamed oysters or other shellfish to avoid risk of Vibrio infection.    Overall prognosis is Poor. Palliative input and discussion of goals of care. Hospice would be appropriate    Patient to be transferred to [facility] per family wishes for continuation of card, after approval, discussed with Dr Waite.

## 2024-12-04 NOTE — DISCHARGE NOTE NURSING/CASE MANAGEMENT/SOCIAL WORK - PATIENT PORTAL LINK FT
You can access the FollowMyHealth Patient Portal offered by [facility] by registering at the following website: [email or web address]. By joining [employer]’s FollowMyHealth portal, you will also be able to view your health information using other applications (apps) compatible with our system.

## 2024-12-04 NOTE — PROGRESS NOTE ADULT - SUBJECTIVE AND OBJECTIVE BOX
Gastroenterology progress note:     Patient is a 69y old  Male who presents with a chief complaint of Altered mental status (04 Dec 2024 10:24)       Admitted on: 11-24-24    We are following the patient for:       Interval History: Worsening VIPUL, anuric      PAST MEDICAL & SURGICAL HISTORY:  History of medical problems  dm type 2, bile duct cancer on chemo, cataracts, gerd, neuropathy (crawling sensation)      H/O inguinal hernia repair  right groin          MEDICATIONS  (STANDING):  albumin human 25% IVPB 100 milliLiter(s) IV Intermittent every 6 hours  chlorhexidine 2% Cloths 1 Application(s) Topical <User Schedule>  cyclobenzaprine 5 milliGRAM(s) Oral three times a day  DAPTOmycin IVPB 500 milliGRAM(s) IV Intermittent every 48 hours  dextrose 50% Injectable 25 Gram(s) IV Push once  dextrose 50% Injectable 12.5 Gram(s) IV Push once  dextrose 50% Injectable 25 Gram(s) IV Push once  dorzolamide 2% Ophthalmic Solution 1 Drop(s) Right EYE every 8 hours  glucagon  Injectable 1 milliGRAM(s) IntraMuscular once  heparin   Injectable 5000 Unit(s) SubCutaneous every 12 hours  influenza  Vaccine (HIGH DOSE) 0.5 milliLiter(s) IntraMuscular once  insulin lispro (ADMELOG) corrective regimen sliding scale   SubCutaneous three times a day before meals  lactated ringers. 1000 milliLiter(s) (75 mL/Hr) IV Continuous <Continuous>  lactulose Syrup 20 Gram(s) Oral daily  levoFLOXacin  Tablet 500 milliGRAM(s) Oral every 48 hours  metroNIDAZOLE    Tablet 500 milliGRAM(s) Oral every 12 hours  midodrine 10 milliGRAM(s) Oral every 8 hours  octreotide  Injectable 200 MICROGram(s) SubCutaneous every 8 hours  pantoprazole    Tablet 40 milliGRAM(s) Oral before breakfast  polyethylene glycol 3350 17 Gram(s) Oral daily  prednisoLONE acetate 1% Suspension 1 Drop(s) Right EYE two times a day  sodium bicarbonate 650 milliGRAM(s) Oral every 8 hours  sodium zirconium cyclosilicate 10 Gram(s) Oral every 12 hours  timolol 0.25% Solution 1 Drop(s) Right EYE two times a day    MEDICATIONS  (PRN):  dextrose Oral Gel 15 Gram(s) Oral once PRN Blood Glucose LESS THAN 70 milliGRAM(s)/deciliter  melatonin 3 milliGRAM(s) Oral at bedtime PRN Insomnia  oxyCODONE    IR 5 milliGRAM(s) Oral every 6 hours PRN Moderate Pain (4 - 6)      Allergies  No Known Allergies      Review of Systems:   Cardiovascular:  No Chest Pain, No Palpitations  Respiratory:  No Cough, No Dyspnea  Gastrointestinal:  As described in HPI  Skin:  No Skin Lesions, No Jaundice  Neuro:  No Syncope, No Dizziness    Physical Examination:  T(C): 36.7 (12-04-24 @ 05:14), Max: 36.9 (12-03-24 @ 20:06)  HR: 78 (12-04-24 @ 05:14) (70 - 78)  BP: 108/59 (12-04-24 @ 05:14) (86/44 - 108/59)  RR: 18 (12-04-24 @ 05:14) (18 - 18)  SpO2: 99% (12-04-24 @ 05:14) (99% - 100%)      12-03-24 @ 07:01  -  12-04-24 @ 07:00  --------------------------------------------------------  IN: 900 mL / OUT: 1300 mL / NET: -400 mL        GENERAL: AAOx2-3  HEAD:  Atraumatic, Normocephalic  EYES: conjunctiva and sclera clear  NECK: Supple, no JVD or thyromegaly  CHEST/LUNG: Clear to auscultation bilaterally; No wheeze, rhonchi, or rales  HEART: Regular rate and rhythm; normal S1, S2, No murmurs.  ABDOMEN: Soft, nontender, nondistended; Bowel sounds present  NEUROLOGY:asterixis  SKIN: Intact, no jaundice     Data:                        9.3    12.71 )-----------( 251      ( 04 Dec 2024 08:10 )             27.8     Hgb trend:  9.3  12-04-24 @ 08:10  9.1  12-03-24 @ 09:34  8.5  12-02-24 @ 07:06        12-04    130[L]  |  97[L]  |  49[H]  ----------------------------<  168[H]  5.4[H]   |  23  |  3.3[H]    Ca    8.1[L]      04 Dec 2024 08:10  Mg     2.1     12-04    TPro  6.5  /  Alb  2.0[L]  /  TBili  1.0  /  DBili  x   /  AST  30  /  ALT  12  /  AlkPhos  148[H]  12-04    Liver panel trend:  TBili 1.0   /   AST 30   /   ALT 12   /   AlkP 148   /   Tptn 6.5   /   Alb 2.0    /   DBili --      12-04  TBili 0.7   /   AST 29   /   ALT 12   /   AlkP 180   /   Tptn 6.8   /   Alb 2.3    /   DBili --      12-03  TBili 0.6   /   AST 28   /   ALT 14   /   AlkP 190   /   Tptn 7.2   /   Alb 2.3    /   DBili --      12-02  TBili 0.4   /   AST 33   /   ALT 13   /   AlkP 201   /   Tptn 6.5   /   Alb 1.8    /   DBili --      12-01  TBili 0.4   /   AST 34   /   ALT 16   /   AlkP 223   /   Tptn 7.1   /   Alb 1.9    /   DBili --      11-30  TBili 0.5   /   AST 47   /   ALT 16   /   AlkP 243   /   Tptn 7.2   /   Alb 2.0    /   DBili --      11-29  TBili 0.6   /   AST 40   /   ALT 19   /   AlkP 275   /   Tptn 7.7   /   Alb 2.2    /   DBili --      11-28  TBili 0.6   /   AST 32   /   ALT 15   /   AlkP 234   /   Tptn 6.7   /   Alb 1.9    /   DBili --      11-27  TBili 0.7   /   AST 33   /   ALT 15   /   AlkP 250   /   Tptn 7.2   /   Alb 2.0    /   DBili --      11-25  TBili 1.1   /   AST 37   /   ALT 16   /   AlkP 258   /   Tptn 7.7   /   Alb 2.1    /   DBili --      11-24          Culture - Urine (collected 03 Dec 2024 06:15)  Source: Clean Catch Clean Catch (Midstream)  Final Report (04 Dec 2024 12:39):    No growth    Culture - Blood (collected 01 Dec 2024 16:00)  Source: .Blood BLOOD  Preliminary Report (04 Dec 2024 01:01):    No growth at 48 Hours

## 2024-12-04 NOTE — PHARMACOTHERAPY INTERVENTION NOTE - COMMENTS
As per policy ordered a CPK for 12/9 AM since patient is on daptomycin and is due for a weekly CPK.      Ibrahima BlueD   Clinical Pharmacy Specialist, Infectious Diseases  Tele-Antimicrobial Stewardship Program (Tele-ASP)  Tele-ASP Phone: [phone]

## 2024-12-04 NOTE — PROGRESS NOTE ADULT - ASSESSMENT
#Decompensated liver cirrhosis with ascites, varices   #Metastatic Cholangiocarcinoma , hx of cholangitis , s/p biliary/hepatic duct drains and cholecystoduodenostomy  at [facility] [place]  #E Coli and STERLING Faecium Bacteremia on 11/24 Likely GI Translocation  #Large Ascites with No Evidence of SBP; Reported Hx of SBP; s/p Peritoneal Drain at [facility] [place]  #Metabolic Encephalopathy Likely in Setting of Bacteremia; Less Likely Hepatic Encephalopathy  #VIPUL- r/o HRS- worsening  - No Evidence of Acute Cholangitis; Reported Hx of Cholangitis;   - Chronic Elevation of ALP  - Perigastric, Paraesophageal, perisplenic and right abdominal wall varices on imaging  * Diagnosed with metastatic cholangiocarcinoma last year. s/p peritoneal drain; biliary/hepatic duct drain; and axios from GB to duodenum at [facility] [place] [facility]  * US Abdomen Upper Quadrant Right (06.18.24 @ 02:49) Liver: Partially imaged. Partially imaged stents. No hepatic collection on provided images.Bile ducts: Common bile duct visualized. Intrahepatic biliary ducts are without significant dilatation.  * CT Abdomen and Pelvis w/ IV Cont (11.24.24 @ 14:43) >large abdominal ascites occupying the entire peritoneum. Questionable reactive changes of the small bowel due to ascites. No bowel obstruction.Drain from the level of the gallbladder to the duodenum with associated metal artifact obscuring evaluation. Biliary duct stents are also noted. questionable scrotal wall edema. Correlate with physical exam. Mildly nodular liver may be seen with cirrhosis and associated varices as described.  * Previous colonoscopy was last year; could not recall last EGD  * No FHx of GI cancers  * S/P drainage of 1L peritoneal fluid via peritoneal drain on 11/25: fluid not suggestive of SBP; staph epidermidis likely contaminant  - 12,2; 12/3: mild asterixis, but AAX3; s/p 2 doses of albumin  - Creatinine Trend: 3.3<--, 3.0<--, 3.0<--, 2.8<--, 2.3<--, 1.8<-- s/o IV albumin, on octreotide and midrodrine, anuric     RECS  - For VIPUL: likely HRS vs pre renal with U Na <20, c/w midodrine and octreotide; recommend IV albumin 1g/kg x1 dose today; nephrology eval appreciated  - Obtain records from [facility]  - Trend liver enzymes  - Recommend goals of care discussion. Recommend oncology/palliative evaluation if family is agreeable  - ID evaluation appreciated. Continue IV Ab per ID ; empiric treatment for endocarditis  - For encephalopathy: Monitor BM and avoid constipation. Continue lactulose 20mg Q6h and miralax 17g QD; titrate 2-3 BMs  - Continue Protonix 40mg QD for GI Prophylaxis; avoid NSAIDs  - Trend CBC and transfuse as needed; keep active type and screen. Check anemia workup (iron studies)  - Follow up with [facility] team or at our [facility] located at [address] [place] [address]. Phone Number: [phone]    - avoid sedatives and opioids   - Please avoid NSAIDs, ACEI/ARB, NSBB      # Lifestyle/Dietary modifications  - Calorie intake 25-40 Kcal/kg/day  - Protein intake: 1.2-1.5 gm/kg/day  - Avoid smoking, alcohol, NSAIDS.  - Abstain from alcohol and all illicit drugs  -Avoid use of herbal and dietary supplements due to potential hepatotoxicity  -Limit use of acetaminophen to <2 grams per day  -Avoid use of nonsteroidal antiinflammatory drugs (NSAIDs)    -Avoid eating any unpasteurized dairy products; avoid eating any raw or undercooked eggs, fish, poultry, or meat; and avoid eating raw/steamed oysters or other shellfish to avoid risk of Vibrio infection.    #Vaccinations:  Please f/u in clinic for being uptodate with HAV/HBV/Influenza/Pneumococcal vaccines.

## 2024-12-04 NOTE — PHARMACOTHERAPY INTERVENTION NOTE - NSPHARMCOMMASP
ASP - Duration of therapy
ASP - Lab/ test recommended
ASP - IV to PO
ASP - Lab/ test recommended
ASP - Lab/ test recommended
ASP - Recommend ID Consult
ASP - Therapy recommended/ Alternative therapy
ASP - Therapy recommended/ Alternative therapy
ASP - Drug-Bug mismatch

## 2024-12-04 NOTE — CHART NOTE - NSCHARTNOTEFT_GEN_A_CORE
Family requested the patient to be transferred to [facility] for continuation of care. Accepted physician is Dr Littlejohn. Spoke to resident Steve Hewitt [phone]

## 2024-12-04 NOTE — DISCHARGE NOTE NURSING/CASE MANAGEMENT/SOCIAL WORK - FINANCIAL ASSISTANCE
[facility] provides services at a reduced cost to those who are determined to be eligible through [facility]’s financial assistance program. Information regarding [facility]’s financial assistance program can be found by going to [email or web address] or by calling [phone].

## 2024-12-04 NOTE — PROGRESS NOTE ADULT - ASSESSMENT
68-year-old male with a past medical history of DM, cataracts, neuropathy, and metastatic cholangiocarcinoma [place] [facility] presenting for altered mental status. daughter Jazlyn López (physician, [phone]) who reports that patient has had short periods of confusion over the last few months, usually caused by infections (cholangitis, SBP...). This morning, patient woke up and was more lethargic than usual, with delayed responses when spoken to. He has been around sick contacts so they decided to bring him in to r/o infection as cause of lethargy. As per daughter, patient has a peritoneal drain, he drains himself 1L of peritoneal fluid daily. Last MR head to check for mets was done 2 months ago and was negative.     #Lethargy   #Metabolic encephalopathy   #VRE and E. coli Bacteremia   #Metastatic cholangiocarcinoma s/p CBD and hepatic duct stenting s/p cholecystoduodenostomy s/p peritoneal drain (currently in place)  #Large abdominal ascites   #Hx of hepatic abscess   * Follows at [facility] in [place] since May 2022 for cholangiocarcinoma  * Follows with Dr Sanchez (Advanced [facility]) and Dr. Littlejohn (oncologist) at [facility]  * Deemed not a surgical candidate because of extensive metastatic disease  * Pt has a peritoneal drain in place and drains himself 1L of peritoneal fluid daily   * Last MR Head around 2 months ago, no mets  - Vitals on arrival: /72, HR 97, T 97, RR 17, SpO2 98% on RA  - On admission WBC 8, lactate 3.9, t bili 1.1, alk phos 258, ammonia 71  - UA negative, RVP negative  - CXR (11.24.24 @ 13:25): Low lung volumes without focal consolidation or pneumothorax.  - CTAP w/ IV Cont (11.24.24 @ 14:43): Large abdominal ascites occupying the entire peritoneum. Questionable reactive changes of the small bowel due to ascites. No bowel obstruction. Drain from the level of the gallbladder to the duodenum with associated metal artifact obscuring evaluation. Biliary duct stents are also noted. Questionable scrotal wall edema. Correlate with physical exam. Mildly nodular liver may be seen with cirrhosis and associated varices as described.  - CT Head No Cont (11.24.24 @ 14:43): Allowing for motion artifact, unremarkable study.  - s/p Ciprofloxacin 400mg, Flagyl 500mg, LR 1L bolus in the ED  - Blood culture positive for VRE and E coli  bugs.   - Continue ceftriaxone, dapto, and flagyl,  will fu ID   - Lactulose 20g q6 for 2 days  - Monitor BMs  - Palliative care consult appreciated   - **If patient remains lethargic or develops confusion, consider MR Head to r/o metastatic disease. It patient drops in BP, give albumin 100cc +/- IVF***  - 1 L fluid drained fluid studies sent. Does not meet criteria for SBP.   - CK ordered for baseline and trend weekly while on Dapto   - TTE ordered to r/o infective endocarditis per ID recs --> negative for IE. ID recommends KELVIN due to concern, pending cardio consult   - last few blood cxs -ve  -- Continue ceftriaxone and flagyl   - Continue Dapto (renally dosed)   - CK weekly while on Dapto   - TTE negative   - Cardio consulted for KELVIN - appreciate cardiology recs -- presence of varices, risks potentially outweigh benefits   -Pt BP on lower side, started IVF yesterday, added midodrine 10mg q8h today.   - Remove port if not needed by oncologist : prefer to salvage port  -- Per id, as unable to rule out endocarditis and with presence of port, would plan:  ·- continue daptomycin 500 mg q 48 hours for 6 weeks from culture clearance (11/27-1/7)  ·-- check CK weekly (last checked 12/2)  ·- continue ceftriaxone 2g daily -- > can switch to Levofloxacin 500 mg q 48 hours PO (end date 12/8)  ·- continue flagyl 500 mg q 12 hours PO (end date 12/8)         #VIPUL on CKD stage 3, likely pre-renal  #oligoanuric   #hyperkalemia  - Creatinine on admission 1.8  - Creatinine 1.2 in June  - s/p 1L LR bolus in ED  - - U lytes c/w prerenal hypovolemia component   - cw oral bicarb  -U/A: Na<20, urine granular casts> likely pre-renal (hepato-renal) with ATN etiology  -given another dose of albumin on 12/2   -midodrine started on 12/2, will start octreotide 200 tid sc.   -rojas placed for suspicion of retention & no urine outpt , was removed yest. total urine outpt 150cc  -f/u nephro recs: possible HD tmrw am if no improvement  started on lokelma 10q12h  -cr uptrending    #thrombocytopenia, resolved  HIT panel -ve    #DM   - On home Farxiga 10mg daily  - ISS for now, target -180  - Monitor FS and adjust accordingly    #Cataract  - Continue home eye drop      #DVT ppx: Heparin  #GI ppx: Pantoprazole  #Diet: CC  #Activity: IAT  #Code: FULL code, confirmed with daughter  #Dispo: Medicine     Plan: f/u kidney function, start octreotide, check BM & adjust lactulose, reach out to patient oncologist [place] [facility] [place] (no answer yest) 68-year-old male with a past medical history of DM, cataracts, neuropathy, and metastatic cholangiocarcinoma ([place] at [facility] presenting for altered mental status. daughter Jazlyn López (physician, [phone]) who reports that patient has had short periods of confusion over the last few months, usually caused by infections (cholangitis, SBP...). This morning, patient woke up and was more lethargic than usual, with delayed responses when spoken to. He has been around sick contacts so they decided to bring him in to r/o infection as cause of lethargy. As per daughter, patient has a peritoneal drain, he drains himself 1L of peritoneal fluid daily. Last MR head to check for mets was done 2 months ago and was negative.     #Lethargy   #Metabolic encephalopathy   #VRE and E. coli Bacteremia   #Metastatic cholangiocarcinoma s/p CBD and hepatic duct stenting s/p cholecystoduodenostomy s/p peritoneal drain (currently in place)  #Large abdominal ascites   #Hx of hepatic abscess   * Follows at [facility] in [place] since May 2022 for cholangiocarcinoma  * Follows with Dr Sanchez (Advanced GI) and Dr. Littlejohn (oncologist) at [facility]  * Deemed not a surgical candidate because of extensive metastatic disease  * Pt has a peritoneal drain in place and drains himself 1L of peritoneal fluid daily   * Last MR Head around 2 months ago, no mets  - Vitals on arrival: /72, HR 97, T 97, RR 17, SpO2 98% on RA  - On admission WBC 8, lactate 3.9, t bili 1.1, alk phos 258, ammonia 71  - UA negative, RVP negative  - CXR (11.24.24 @ 13:25): Low lung volumes without focal consolidation or pneumothorax.  - CTAP w/ IV Cont (11.24.24 @ 14:43): Large abdominal ascites occupying the entire peritoneum. Questionable reactive changes of the small bowel due to ascites. No bowel obstruction. Drain from the level of the gallbladder to the duodenum with associated metal artifact obscuring evaluation. Biliary duct stents are also noted. Questionable scrotal wall edema. Correlate with physical exam. Mildly nodular liver may be seen with cirrhosis and associated varices as described.  - CT Head No Cont (11.24.24 @ 14:43): Allowing for motion artifact, unremarkable study.  - s/p Ciprofloxacin 400mg, Flagyl 500mg, LR 1L bolus in the ED  - Blood culture positive for VRE and E coli  bugs.   - Continue ceftriaxone, dapto, and flagyl,  will fu ID   - Lactulose 20g q6 for 2 days  - Monitor BMs  - Palliative care consult appreciated   - **If patient remains lethargic or develops confusion, consider MR Head to r/o metastatic disease. It patient drops in BP, give albumin 100cc +/- IVF***  - 1 L fluid drained fluid studies sent. Does not meet criteria for SBP.   - CK ordered for baseline and trend weekly while on Dapto   - TTE ordered to r/o infective endocarditis per ID recs --> negative for IE. ID recommends KELVIN due to concern, pending cardio consult   - last few blood cxs -ve  -- Continue ceftriaxone and flagyl   - Continue Dapto (renally dosed)   - CK weekly while on Dapto   - TTE negative   - Cardio consulted for KELVIN - appreciate cardiology recs -- presence of varices, risks potentially outweigh benefits   -Pt BP on lower side, started IVF yesterday, added midodrine 10mg q8h today.   - Remove port if not needed by oncologist : prefer to salvage port  -- Per id, as unable to rule out endocarditis and with presence of port, would plan:  ·- continue daptomycin 500 mg q 48 hours for 6 weeks from culture clearance (11/27-1/7)  ·-- check CK weekly (last checked 12/2)  ·- continue ceftriaxone 2g daily -- > can switch to Levofloxacin 500 mg q 48 hours PO (end date 12/8)  ·- continue flagyl 500 mg q 12 hours PO (end date 12/8)         #VIPUL on CKD stage 3, likely pre-renal  #oligoanuric   #hyperkalemia  - Creatinine on admission 1.8  - Creatinine 1.2 in June  - s/p 1L LR bolus in ED  - - U lytes c/w prerenal hypovolemia component   - cw oral bicarb  -U/A: Na<20, urine granular casts> likely pre-renal (hepato-renal) with ATN etiology  -given another dose of albumin on 12/2   -midodrine started on 12/2, will start octreotide 200 tid sc.   -rojas placed for suspicion of retention & no urine outpt , was removed yest. total urine outpt 150cc  -f/u nephro recs: possible HD tmrw am if no improvement  started on lokelma 10q12h  -cr uptrending    #thrombocytopenia, resolved  HIT panel -ve    #DM   - On home Farxiga 10mg daily  - ISS for now, target -180  - Monitor FS and adjust accordingly    #Cataract  - Continue home eye drop      #DVT ppx: Heparin  #GI ppx: Pantoprazole  #Diet: CC  #Activity: IAT  #Code: FULL code, confirmed with daughter  #Dispo: Medicine     Plan: f/u kidney function, start octreotide, check BM & adjust lactulose if needed (4BM last 24 hrs), reach out to patient oncologist at [facility] for full records (said they will fax records over)

## 2024-12-04 NOTE — PROGRESS NOTE ADULT - ATTENDING COMMENTS
69 y o M of [place] ethnicity with T2DM metastatic cholangiocarcinoma admitted with change in mental status seen for cirrhosis with ascites and varices on imaging.    Patient reports [place] at [facility] hx of cholangitis s/p biliary stents. Reported hx of SBP. Admissions with confusion. Patient reprots not awarw of prognosis.    Minimal urine output  No icterus  Abdomen mild distension non tender abdominal catheter+  Asterixis +    Cirrhosis  portal HTN decompensation with ascites HE  CT shows irregular liver contour SAAG 1.5  VIPUL - likely HRS-VIPUL  Hx of SBP  Metastatic cholangio ca  Hx of cholangitis with biliary stents and choledochoduodenostomy      IV albumin with octreotide and midodrine  Continue lactulose for 3 BM per day  Palliative input and discussion of goals of care. Hospice would be appropriate  Records from [facility] .

## 2024-12-04 NOTE — PROGRESS NOTE ADULT - SUBJECTIVE AND OBJECTIVE BOX
seen and examined  24 h events noted   no distress        PAST HISTORY  --------------------------------------------------------------------------------  No significant changes to PMH, PSH, FHx, SHx, unless otherwise noted    ALLERGIES & MEDICATIONS  --------------------------------------------------------------------------------  Allergies    No Known Allergies    Intolerances      Standing Inpatient Medications  chlorhexidine 2% Cloths 1 Application(s) Topical <User Schedule>  DAPTOmycin IVPB 500 milliGRAM(s) IV Intermittent every 48 hours  dextrose 50% Injectable 25 Gram(s) IV Push once  dextrose 50% Injectable 12.5 Gram(s) IV Push once  dextrose 50% Injectable 25 Gram(s) IV Push once  dorzolamide 2% Ophthalmic Solution 1 Drop(s) Right EYE every 8 hours  glucagon  Injectable 1 milliGRAM(s) IntraMuscular once  heparin   Injectable 5000 Unit(s) SubCutaneous every 12 hours  influenza  Vaccine (HIGH DOSE) 0.5 milliLiter(s) IntraMuscular once  insulin lispro (ADMELOG) corrective regimen sliding scale   SubCutaneous three times a day before meals  lactated ringers. 1000 milliLiter(s) IV Continuous <Continuous>  lactulose Syrup 20 Gram(s) Oral daily  levoFLOXacin  Tablet 500 milliGRAM(s) Oral every 48 hours  metroNIDAZOLE    Tablet 500 milliGRAM(s) Oral every 12 hours  midodrine 10 milliGRAM(s) Oral every 8 hours  pantoprazole    Tablet 40 milliGRAM(s) Oral before breakfast  polyethylene glycol 3350 17 Gram(s) Oral daily  prednisoLONE acetate 1% Suspension 1 Drop(s) Right EYE two times a day  sodium bicarbonate 650 milliGRAM(s) Oral every 8 hours  sodium zirconium cyclosilicate 10 Gram(s) Oral once  timolol 0.25% Solution 1 Drop(s) Right EYE two times a day    PRN Inpatient Medications  dextrose Oral Gel 15 Gram(s) Oral once PRN  melatonin 3 milliGRAM(s) Oral at bedtime PRN  oxyCODONE    IR 5 milliGRAM(s) Oral every 6 hours PRN      VITALS/PHYSICAL EXAM  --------------------------------------------------------------------------------  T(C): 36.7 (12-04-24 @ 05:14), Max: 36.9 (12-03-24 @ 20:06)  HR: 78 (12-04-24 @ 05:14) (70 - 79)  BP: 108/59 (12-04-24 @ 05:14) (86/44 - 114/62)  RR: 18 (12-04-24 @ 05:14) (18 - 18)  SpO2: 99% (12-04-24 @ 05:14) (99% - 100%)  Wt(kg): --        12-03-24 @ 07:01  -  12-04-24 @ 07:00  --------------------------------------------------------  IN: 900 mL / OUT: 1300 mL / NET: -400 mL      Physical Exam:  	Gen: NAD  	Pulm: CTA B/L  	CV:  S1S2; no rub  	Abd: +distended  	LE: no edema  	  LABS/STUDIES  --------------------------------------------------------------------------------              9.3    12.71 >-----------<  251      [12-04-24 @ 08:10]              27.8     130  |  97  |  49  ----------------------------<  168      [12-04-24 @ 08:10]  5.4   |  23  |  3.3        Ca     8.1     [12-04-24 @ 08:10]      Mg     2.1     [12-04-24 @ 08:10]    TPro  6.5  /  Alb  2.0  /  TBili  1.0  /  DBili  x   /  AST  30  /  ALT  12  /  AlkPhos  148  [12-04-24 @ 08:10]      Creatinine Trend:  SCr 3.3 [12-04 @ 08:10]  SCr 3.0 [12-03 @ 09:34]  SCr 3.0 [12-02 @ 07:06]  SCr 2.8 [12-01 @ 09:38]  SCr 2.3 [11-30 @ 08:32]    Urinalysis - [12-04-24 @ 08:10]      Color  / Appearance  / SG  / pH       Gluc 168 / Ketone   / Bili  / Urobili        Blood  / Protein  / Leuk Est  / Nitrite       RBC  / WBC  / Hyaline  / Gran  / Sq Epi  / Non Sq Epi  / Bacteria     Urine Creatinine 160      [12-02-24 @ 17:02]  Urine Protein 134      [12-02-24 @ 17:02]  Urine Sodium <20.0      [12-02-24 @ 17:02]

## 2024-12-04 NOTE — DISCHARGE NOTE PROVIDER - NSDCMRMEDTOKEN_GEN_ALL_CORE_FT
dorzolamide 2% ophthalmic solution: 1 drop(s) in each eye 2 times a day to right eye  ergocalciferol 1.25 mg (50,000 intl units) oral tablet: orally once a day  Farxiga 10 mg oral tablet: 1 tab(s) orally once a day  Megace 20 mg oral tablet: 1 tab(s) orally once a day  MiraLax oral powder for reconstitution: 17 gram(s) orally once a day  Prednisol 1% ophthalmic solution: 1 drop(s) in each affected eye 2 times a day to right eye  Protonix 40 mg oral delayed release tablet: 1 tab(s) orally once a day  timolol hemihydrate 0.25% ophthalmic solution: 1 drop(s) in each affected eye 2 times a day to right eye   cyclobenzaprine 5 mg oral tablet: 1 tab(s) orally 2 times a day  DAPTOmycin 500 mg intravenous injection: 500 milligram(s) intravenous every 48 hours  dorzolamide 2% ophthalmic solution: 1 drop(s) in each eye 2 times a day to right eye  ergocalciferol 1.25 mg (50,000 intl units) oral tablet: orally once a day  Farxiga 10 mg oral tablet: 1 tab(s) orally once a day  lactulose 10 g/15 mL oral syrup: 30 milliliter(s) orally once a day  levoFLOXacin 500 mg oral tablet: 1 tab(s) orally every 48 hours  Megace 20 mg oral tablet: 1 tab(s) orally once a day  melatonin 3 mg oral tablet: 1 tab(s) orally once a day (at bedtime) As needed Insomnia  metroNIDAZOLE 500 mg oral tablet: 1 tab(s) orally every 12 hours  midodrine 10 mg oral tablet: 1 tab(s) orally every 8 hours  octreotide 2500 mcg/mL subcutaneous solution: 0.08 milliliter(s) subcutaneous every 8 hours  oxyCODONE 5 mg oral tablet: 1 tab(s) orally every 6 hours As needed Moderate Pain (4 - 6)  Prednisol 1% ophthalmic solution: 1 drop(s) in each affected eye 2 times a day to right eye  Protonix 40 mg oral delayed release tablet: 1 tab(s) orally once a day  sodium bicarbonate 650 mg oral tablet: 1 tab(s) orally every 8 hours  timolol hemihydrate 0.25% ophthalmic solution: 1 drop(s) in each affected eye 2 times a day to right eye

## 2024-12-04 NOTE — PROGRESS NOTE ADULT - SUBJECTIVE AND OBJECTIVE BOX
SUBJECTIVE / OVERNIGHT EVENTS    yest patient still anuric with no urine draining in rojas. around 10pm rojas was removed as it was bothersome for pt. total urine outpt only 150cc.     MEDICATIONS  chlorhexidine 2% Cloths 1 Application(s) Topical <User Schedule>  DAPTOmycin IVPB 500 milliGRAM(s) IV Intermittent every 48 hours  dextrose 50% Injectable 25 Gram(s) IV Push once  dextrose 50% Injectable 12.5 Gram(s) IV Push once  dextrose 50% Injectable 25 Gram(s) IV Push once  dorzolamide 2% Ophthalmic Solution 1 Drop(s) Right EYE every 8 hours  glucagon  Injectable 1 milliGRAM(s) IntraMuscular once  heparin   Injectable 5000 Unit(s) SubCutaneous every 12 hours  influenza  Vaccine (HIGH DOSE) 0.5 milliLiter(s) IntraMuscular once  insulin lispro (ADMELOG) corrective regimen sliding scale   SubCutaneous three times a day before meals  lactated ringers. 1000 milliLiter(s) IV Continuous <Continuous>  lactulose Syrup 20 Gram(s) Oral daily  levoFLOXacin  Tablet 500 milliGRAM(s) Oral every 48 hours  metroNIDAZOLE    Tablet 500 milliGRAM(s) Oral every 12 hours  midodrine 10 milliGRAM(s) Oral every 8 hours  pantoprazole    Tablet 40 milliGRAM(s) Oral before breakfast  polyethylene glycol 3350 17 Gram(s) Oral daily  prednisoLONE acetate 1% Suspension 1 Drop(s) Right EYE two times a day  sodium bicarbonate 650 milliGRAM(s) Oral every 8 hours  sodium zirconium cyclosilicate 10 Gram(s) Oral once  sodium zirconium cyclosilicate 10 Gram(s) Oral every 12 hours  timolol 0.25% Solution 1 Drop(s) Right EYE two times a day    dextrose Oral Gel 15 Gram(s) Oral once PRN Blood Glucose LESS THAN 70 milliGRAM(s)/deciliter  melatonin 3 milliGRAM(s) Oral at bedtime PRN Insomnia  oxyCODONE    IR 5 milliGRAM(s) Oral every 6 hours PRN Moderate Pain (4 - 6)    VITALS /  EXAM    T(C): 36.7 (12-04-24 @ 05:14), Max: 36.9 (12-03-24 @ 20:06)  HR: 78 (12-04-24 @ 05:14) (70 - 79)  BP: 108/59 (12-04-24 @ 05:14) (86/44 - 114/62)  RR: 18 (12-04-24 @ 05:14) (18 - 18)  SpO2: 99% (12-04-24 @ 05:14) (99% - 100%)  POCT Blood Glucose.: 167 mg/dL (12-04-24 @ 08:01)  POCT Blood Glucose.: 163 mg/dL (12-03-24 @ 20:36)  POCT Blood Glucose.: 237 mg/dL (12-03-24 @ 16:45)  POCT Blood Glucose.: 174 mg/dL (12-03-24 @ 11:45)    GENERAL: NAD, well-developed  CHEST/LUNG: Clear to auscultation bilaterally; No wheezes, rales or rhonchi  HEART: Regular rate and rhythm; No murmurs, rubs, or gallops  ABDOMEN: Soft, distended, BS +ve  EXTREMITIES:   No clubbing, cyanosis, or edema    I's & O's     12-03-24 @ 07:01  -  12-04-24 @ 07:00  --------------------------------------------------------  IN:    Lactated Ringers: 900 mL  Total IN: 900 mL    OUT:    Drain (mL): 1200 mL    Voided (mL): 100 mL  Total OUT: 1300 mL    Total NET: -400 mL        LABS             9.3    12.71 )-----------( 251      ( 12-04-24 @ 08:10 )             27.8     130  |  97  |  49  -------------------------<  168   12-04-24 @ 08:10  5.4  |  23  |  3.3    Ca      8.1     12-04-24 @ 08:10  Mg     2.1     12-04-24 @ 08:10    TPro  6.5  /  Alb  2.0  /  TBili  1.0  /  DBili  x   /  AST  30  /  ALT  12  /  AlkPhos  148  /  GGT  x     12-04-24 @ 08:10        Urinalysis Basic - ( 04 Dec 2024 08:10 )    Color: x / Appearance: x / SG: x / pH: x  Gluc: 168 mg/dL / Ketone: x  / Bili: x / Urobili: x   Blood: x / Protein: x / Nitrite: x   Leuk Esterase: x / RBC: x / WBC x   Sq Epi: x / Non Sq Epi: x / Bacteria: x      MICRO / IMAGING / CARDIOLOGY  Telemetry: Reviewed   EKG: Reviewed    CULTURES    Culture - Blood (collected 12-01-24 @ 16:00)  Source: .Blood BLOOD  Preliminary Report:    No growth at 48 Hours    Culture - Blood (collected 11-29-24 @ 11:36)  Source: .Blood BLOOD  Preliminary Report:    No growth at 4 days      IMAGING  PACS Image:  (12-03-24 @ 08:00)    CARDIOLOGY

## 2024-12-07 LAB
CULTURE RESULTS: SIGNIFICANT CHANGE UP
SPECIMEN SOURCE: SIGNIFICANT CHANGE UP

## 2024-12-11 DIAGNOSIS — Z88.1 ALLERGY STATUS TO OTHER ANTIBIOTIC AGENTS: ICD-10-CM

## 2024-12-11 DIAGNOSIS — E87.5 HYPERKALEMIA: ICD-10-CM

## 2024-12-11 DIAGNOSIS — E87.20 ACIDOSIS, UNSPECIFIED: ICD-10-CM

## 2024-12-11 DIAGNOSIS — C79.9 SECONDARY MALIGNANT NEOPLASM OF UNSPECIFIED SITE: ICD-10-CM

## 2024-12-11 DIAGNOSIS — R06.6 HICCOUGH: ICD-10-CM

## 2024-12-11 DIAGNOSIS — D84.81 IMMUNODEFICIENCY DUE TO CONDITIONS CLASSIFIED ELSEWHERE: ICD-10-CM

## 2024-12-11 DIAGNOSIS — C22.1 INTRAHEPATIC BILE DUCT CARCINOMA: ICD-10-CM

## 2024-12-11 DIAGNOSIS — I95.9 HYPOTENSION, UNSPECIFIED: ICD-10-CM

## 2024-12-11 DIAGNOSIS — K76.7 HEPATORENAL SYNDROME: ICD-10-CM

## 2024-12-11 DIAGNOSIS — E11.40 TYPE 2 DIABETES MELLITUS WITH DIABETIC NEUROPATHY, UNSPECIFIED: ICD-10-CM

## 2024-12-11 DIAGNOSIS — R18.0 MALIGNANT ASCITES: ICD-10-CM

## 2024-12-11 DIAGNOSIS — E87.1 HYPO-OSMOLALITY AND HYPONATREMIA: ICD-10-CM

## 2024-12-11 DIAGNOSIS — Z16.21 RESISTANCE TO VANCOMYCIN: ICD-10-CM

## 2024-12-11 DIAGNOSIS — N18.30 CHRONIC KIDNEY DISEASE, STAGE 3 UNSPECIFIED: ICD-10-CM

## 2024-12-11 DIAGNOSIS — E11.36 TYPE 2 DIABETES MELLITUS WITH DIABETIC CATARACT: ICD-10-CM

## 2024-12-11 DIAGNOSIS — D69.6 THROMBOCYTOPENIA, UNSPECIFIED: ICD-10-CM

## 2024-12-11 DIAGNOSIS — R78.81 BACTEREMIA: ICD-10-CM

## 2024-12-11 DIAGNOSIS — E11.22 TYPE 2 DIABETES MELLITUS WITH DIABETIC CHRONIC KIDNEY DISEASE: ICD-10-CM

## 2024-12-11 DIAGNOSIS — N32.89 OTHER SPECIFIED DISORDERS OF BLADDER: ICD-10-CM

## 2024-12-11 DIAGNOSIS — K74.69 OTHER CIRRHOSIS OF LIVER: ICD-10-CM

## 2024-12-11 DIAGNOSIS — B95.2 ENTEROCOCCUS AS THE CAUSE OF DISEASES CLASSIFIED ELSEWHERE: ICD-10-CM

## 2024-12-11 DIAGNOSIS — E86.1 HYPOVOLEMIA: ICD-10-CM

## 2024-12-11 DIAGNOSIS — G93.41 METABOLIC ENCEPHALOPATHY: ICD-10-CM

## 2024-12-11 DIAGNOSIS — N17.0 ACUTE KIDNEY FAILURE WITH TUBULAR NECROSIS: ICD-10-CM

## 2024-12-11 DIAGNOSIS — K21.9 GASTRO-ESOPHAGEAL REFLUX DISEASE WITHOUT ESOPHAGITIS: ICD-10-CM

## 2024-12-11 DIAGNOSIS — K76.6 PORTAL HYPERTENSION: ICD-10-CM

## 2024-12-16 ENCOUNTER — APPOINTMENT (OUTPATIENT)
Facility: CLINIC | Age: 69
End: 2024-12-16

## 2024-12-25 LAB
CULTURE RESULTS: SIGNIFICANT CHANGE UP
SPECIMEN SOURCE: SIGNIFICANT CHANGE UP